# Patient Record
Sex: FEMALE | Race: WHITE | NOT HISPANIC OR LATINO | Employment: OTHER | URBAN - METROPOLITAN AREA
[De-identification: names, ages, dates, MRNs, and addresses within clinical notes are randomized per-mention and may not be internally consistent; named-entity substitution may affect disease eponyms.]

---

## 2018-03-12 ENCOUNTER — HOSPITAL ENCOUNTER (OUTPATIENT)
Dept: FAMILY MEDICINE CLINIC | Facility: CLINIC | Age: 33
Setting detail: SPECIMEN
Discharge: HOME | End: 2018-03-12
Attending: FAMILY MEDICINE | Admitting: FAMILY MEDICINE

## 2018-09-25 ENCOUNTER — HOSPITAL ENCOUNTER (OUTPATIENT)
Dept: FAMILY MEDICINE CLINIC | Facility: CLINIC | Age: 33
Setting detail: SPECIMEN
Discharge: HOME | End: 2018-09-25
Attending: FAMILY MEDICINE | Admitting: FAMILY MEDICINE

## 2018-09-25 LAB
CHOLEST SERPL-MCNC: 249 MG/DL
CHOLEST/HDLC SERPL: 6.6 {RATIO}
CONV LDL CHOLESTEROL DIRECT: 206 MG/DL (ref 0–100)
GLUCOSE SERPL-MCNC: 99 MG/DL (ref 65–99)
HDLC SERPL-MCNC: 38 MG/DL
LDLC/HDLC SERPL: 5.5 {RATIO}
LIPID INTERPRETATION: ABNORMAL
TRIGL SERPL-MCNC: 150 MG/DL
VLDLC SERPL CALC-MCNC: 5.8 MG/DL

## 2018-10-01 ENCOUNTER — HOSPITAL ENCOUNTER (OUTPATIENT)
Dept: ULTRASOUND IMAGING | Facility: HOSPITAL | Age: 33
Discharge: HOME | End: 2018-10-01
Attending: FAMILY MEDICINE | Admitting: FAMILY MEDICINE

## 2018-10-10 ENCOUNTER — HOSPITAL ENCOUNTER (OUTPATIENT)
Dept: OTHER | Facility: HOSPITAL | Age: 33
Discharge: HOME | End: 2018-10-10
Attending: SURGERY | Admitting: SURGERY

## 2018-10-10 LAB
ANION GAP SERPL CALC-SCNC: 10.7 MMOL/L (ref 10–20)
BASOPHILS # BLD AUTO: 0 10*3/UL (ref 0–0.2)
BASOPHILS NFR BLD AUTO: 1 % (ref 0–2)
BUN SERPL-MCNC: 7 MG/DL (ref 8–20)
BUN/CREAT SERPL: 11.7 (ref 5.4–26.2)
CALCIUM SERPL-MCNC: 9.2 MG/DL (ref 8.9–10.3)
CHLORIDE SERPL-SCNC: 104 MMOL/L (ref 101–111)
CONV CO2: 25 MMOL/L (ref 22–32)
CREAT UR-MCNC: 0.6 MG/DL (ref 0.4–1)
DIFFERENTIAL METHOD BLD: (no result)
EOSINOPHIL # BLD AUTO: 0.1 10*3/UL (ref 0–0.3)
EOSINOPHIL # BLD AUTO: 1 % (ref 0–3)
ERYTHROCYTE [DISTWIDTH] IN BLOOD BY AUTOMATED COUNT: 12.7 % (ref 11.5–14.5)
GLUCOSE SERPL-MCNC: 86 MG/DL (ref 65–99)
HCG INTACT+B SERPL-ACNC: <1 MIU/ML
HCT VFR BLD AUTO: 37.1 % (ref 35–49)
HGB BLD-MCNC: 12.7 G/DL (ref 12–15)
LYMPHOCYTES # BLD AUTO: 2.7 10*3/UL (ref 0.8–4.8)
LYMPHOCYTES NFR BLD AUTO: 28 % (ref 18–42)
MCH RBC QN AUTO: 29.9 PG (ref 26–32)
MCHC RBC AUTO-ENTMCNC: 34.3 G/DL (ref 32–36)
MCV RBC AUTO: 87.4 FL (ref 80–94)
MICRO REPORT STATUS: NORMAL
MONOCYTES # BLD AUTO: 0.6 10*3/UL (ref 0.1–1.3)
MONOCYTES NFR BLD AUTO: 7 % (ref 2–11)
NEUTROPHILS # BLD AUTO: 6.1 10*3/UL (ref 2.3–8.6)
NEUTROPHILS NFR BLD AUTO: 63 % (ref 50–75)
NRBC BLD AUTO-RTO: 0 /100{WBCS}
NRBC/RBC NFR BLD MANUAL: 0 10*3/UL
PLATELET # BLD AUTO: 256 10*3/UL (ref 150–450)
PMV BLD AUTO: 8.3 FL (ref 7.4–10.4)
POTASSIUM SERPL-SCNC: 3.7 MMOL/L (ref 3.6–5.1)
RBC # BLD AUTO: 4.25 10*6/UL (ref 4–5.4)
SODIUM SERPL-SCNC: 136 MMOL/L (ref 136–144)
WBC # BLD AUTO: 9.5 10*3/UL (ref 4.5–11.5)

## 2018-10-15 ENCOUNTER — HOSPITAL ENCOUNTER (OUTPATIENT)
Dept: PREOP | Facility: HOSPITAL | Age: 33
Setting detail: HOSPITAL OUTPATIENT SURGERY
Discharge: HOME | End: 2018-10-15
Attending: SURGERY | Admitting: SURGERY

## 2019-01-25 ENCOUNTER — HOSPITAL ENCOUNTER (OUTPATIENT)
Dept: FAMILY MEDICINE CLINIC | Facility: CLINIC | Age: 34
Setting detail: SPECIMEN
Discharge: HOME | End: 2019-01-25
Attending: FAMILY MEDICINE | Admitting: FAMILY MEDICINE

## 2019-01-25 LAB
ALBUMIN SERPL-MCNC: 3.8 G/DL (ref 3.5–4.8)
ALBUMIN/GLOB SERPL: 1.1 {RATIO} (ref 1–1.7)
ALP SERPL-CCNC: 77 IU/L (ref 32–91)
ALT SERPL-CCNC: 21 IU/L (ref 14–54)
ANION GAP SERPL CALC-SCNC: 13.2 MMOL/L (ref 10–20)
AST SERPL-CCNC: 23 IU/L (ref 15–41)
BILIRUB SERPL-MCNC: 0.5 MG/DL (ref 0.3–1.2)
BUN SERPL-MCNC: 8 MG/DL (ref 8–20)
BUN/CREAT SERPL: 13.3 (ref 5.4–26.2)
CALCIUM SERPL-MCNC: 9.4 MG/DL (ref 8.9–10.3)
CHLORIDE SERPL-SCNC: 101 MMOL/L (ref 101–111)
CHOLEST SERPL-MCNC: 182 MG/DL
CHOLEST/HDLC SERPL: 4.4 {RATIO}
CONV CO2: 25 MMOL/L (ref 22–32)
CONV LDL CHOLESTEROL DIRECT: 151 MG/DL (ref 0–100)
CONV TOTAL PROTEIN: 7.4 G/DL (ref 6.1–7.9)
CREAT UR-MCNC: 0.6 MG/DL (ref 0.4–1)
GLOBULIN UR ELPH-MCNC: 3.6 G/DL (ref 2.5–3.8)
GLUCOSE SERPL-MCNC: 114 MG/DL (ref 65–99)
HDLC SERPL-MCNC: 42 MG/DL
LDLC/HDLC SERPL: 3.6 {RATIO}
LIPID INTERPRETATION: ABNORMAL
POTASSIUM SERPL-SCNC: 4.2 MMOL/L (ref 3.6–5.1)
SODIUM SERPL-SCNC: 135 MMOL/L (ref 136–144)
TRIGL SERPL-MCNC: 67 MG/DL
VLDLC SERPL CALC-MCNC: 0 MG/DL

## 2019-03-11 ENCOUNTER — HOSPITAL ENCOUNTER (OUTPATIENT)
Dept: ORTHOPEDIC SURGERY | Facility: CLINIC | Age: 34
Discharge: HOME | End: 2019-03-11
Attending: PODIATRIST | Admitting: PODIATRIST

## 2019-03-12 ENCOUNTER — HOSPITAL ENCOUNTER (OUTPATIENT)
Dept: PREADMISSION TESTING | Facility: HOSPITAL | Age: 34
Discharge: HOME | End: 2019-03-12
Attending: PODIATRIST | Admitting: PODIATRIST

## 2019-03-12 LAB
ANION GAP SERPL CALC-SCNC: 12.9 MMOL/L (ref 10–20)
BACTERIA SPEC AEROBE CULT: NORMAL
BASOPHILS # BLD AUTO: 0.1 10*3/UL (ref 0–0.2)
BASOPHILS NFR BLD AUTO: 1 % (ref 0–2)
BUN SERPL-MCNC: 6 MG/DL (ref 8–20)
BUN/CREAT SERPL: 12 (ref 5.4–26.2)
CALCIUM SERPL-MCNC: 9.6 MG/DL (ref 8.9–10.3)
CHLORIDE SERPL-SCNC: 102 MMOL/L (ref 101–111)
CONV CO2: 25 MMOL/L (ref 22–32)
CREAT UR-MCNC: 0.5 MG/DL (ref 0.4–1)
DIFFERENTIAL METHOD BLD: (no result)
EOSINOPHIL # BLD AUTO: 0.1 10*3/UL (ref 0–0.3)
EOSINOPHIL # BLD AUTO: 1 % (ref 0–3)
ERYTHROCYTE [DISTWIDTH] IN BLOOD BY AUTOMATED COUNT: 12.9 % (ref 11.5–14.5)
GLUCOSE SERPL-MCNC: 101 MG/DL (ref 65–99)
HCT VFR BLD AUTO: 38.9 % (ref 35–49)
HGB BLD-MCNC: 13.2 G/DL (ref 12–15)
LYMPHOCYTES # BLD AUTO: 2.7 10*3/UL (ref 0.8–4.8)
LYMPHOCYTES NFR BLD AUTO: 31 % (ref 18–42)
Lab: NORMAL
MCH RBC QN AUTO: 30 PG (ref 26–32)
MCHC RBC AUTO-ENTMCNC: 34 G/DL (ref 32–36)
MCV RBC AUTO: 88.3 FL (ref 80–94)
MICRO REPORT STATUS: NORMAL
MONOCYTES # BLD AUTO: 0.7 10*3/UL (ref 0.1–1.3)
MONOCYTES NFR BLD AUTO: 7 % (ref 2–11)
NEUTROPHILS # BLD AUTO: 5.3 10*3/UL (ref 2.3–8.6)
NEUTROPHILS NFR BLD AUTO: 60 % (ref 50–75)
NRBC BLD AUTO-RTO: 0 /100{WBCS}
NRBC/RBC NFR BLD MANUAL: 0 10*3/UL
PLATELET # BLD AUTO: 285 10*3/UL (ref 150–450)
PMV BLD AUTO: 8.4 FL (ref 7.4–10.4)
POTASSIUM SERPL-SCNC: 3.9 MMOL/L (ref 3.6–5.1)
RBC # BLD AUTO: 4.41 10*6/UL (ref 4–5.4)
SODIUM SERPL-SCNC: 136 MMOL/L (ref 136–144)
SPECIMEN SOURCE: NORMAL
WBC # BLD AUTO: 8.8 10*3/UL (ref 4.5–11.5)

## 2019-03-15 ENCOUNTER — HOSPITAL ENCOUNTER (OUTPATIENT)
Dept: PREOP | Facility: HOSPITAL | Age: 34
Setting detail: HOSPITAL OUTPATIENT SURGERY
Discharge: HOME | End: 2019-03-15
Attending: PODIATRIST | Admitting: PODIATRIST

## 2019-04-24 ENCOUNTER — HOSPITAL ENCOUNTER (OUTPATIENT)
Dept: ORTHOPEDIC SURGERY | Facility: CLINIC | Age: 34
Discharge: HOME | End: 2019-04-24
Attending: PODIATRIST | Admitting: PODIATRIST

## 2019-05-22 ENCOUNTER — CONVERSION ENCOUNTER (OUTPATIENT)
Dept: ORTHOPEDIC SURGERY | Facility: CLINIC | Age: 34
End: 2019-05-22

## 2019-05-22 ENCOUNTER — HOSPITAL ENCOUNTER (OUTPATIENT)
Dept: ORTHOPEDIC SURGERY | Facility: CLINIC | Age: 34
Discharge: HOME | End: 2019-05-22
Attending: PODIATRIST | Admitting: PODIATRIST

## 2019-05-30 ENCOUNTER — CONVERSION ENCOUNTER (OUTPATIENT)
Dept: FAMILY MEDICINE CLINIC | Facility: CLINIC | Age: 34
End: 2019-05-30

## 2019-06-04 VITALS
HEART RATE: 64 BPM | WEIGHT: 222 LBS | SYSTOLIC BLOOD PRESSURE: 118 MMHG | DIASTOLIC BLOOD PRESSURE: 80 MMHG | BODY MASS INDEX: 38.11 KG/M2

## 2019-06-04 VITALS
DIASTOLIC BLOOD PRESSURE: 76 MMHG | BODY MASS INDEX: 37.9 KG/M2 | SYSTOLIC BLOOD PRESSURE: 109 MMHG | HEIGHT: 64 IN | HEART RATE: 78 BPM | WEIGHT: 222 LBS

## 2019-06-06 NOTE — PROGRESS NOTES
Vital Signs:    Patient Profile:    33 Years Old Female  Height:     64 inches  Weight:     222 pounds  BMI:        38.10     Temp:       98.1 degrees F oral  Pulse rate: 64 / minute  Pulse rhythm:   regular  BP Sittin / 80  (right arm)    Cuff size:  large      Problems: Active problems were reviewed with the patient during this visit.  Medications: Medications were reviewed with the patient during this visit.  Allergies: Allergies were reviewed with the patient during this visit.        Vitals Entered By: [facility]      Visit Type:  Follow-up Visit  Referring Provider:  Mark Hercules MD  Primary Provider:  Mark Hercules MD      History of Present Illness:  1) 32 y/o patient here for 4 month F/U of multiple medical problems.  2) patient is seeing Dr. Lucero about fractured left ankle. Put in ankle braces. Has been released by Dr. Lucero.  3) patient is not eating right.  4) patient c/o left hip pain, worse since fractured left ankle. Worse when lays on it. Does not hurt to walk.  5) patient with no depression. Mood is better. No SI.      Past Medical History:     Reviewed history from 2016 and no changes required:        Psychiatric Hx: depression,        Respiratory Hx: allergies        Genetic Background: autism        Endocrine Hx: obesity        HEENT Hx: hearing loss        Psychiatric Hx: anger issues        Musculoskeletal Hx: congenital hip dysplasia left        goiter        hyperglycemia        Chronic Right foot/ankle Deformity        Right Pupil Dilation        Migraine headaches        Social History:     Reviewed history from 2018 and no changes required:        Patient has never smoked.        Passive Smoke: N        Alcohol Use: N        Drug Use: N        HIV/High Risk: N        Regular Exercise: Y        Hx Domestic Abuse: N        [employer] Affecting Care: N                Risk Factors:     Smoked Tobacco Use:  Never smoker  Smokeless Tobacco Use:  Never  Passive smoke  exposure:  no  Drug use:  no  HIV high-risk behavior:  no  Caffeine use:  0 drinks per day  Alcohol use:  no  Exercise:  yes     Times per week:  minimal  Seatbelt use:  100 %  Sun Exposure:  rarely      Review of Systems          The patient complains of joint pain.  The patient denies fever, chills, diplopia, chest pain, palpitations, syncope, dyspnea on exertion, hemoptysis, abdominal pain, melena, hematochezia, jaundice, hematuria, depression and anxiety.        Physical Exam   Weight:  222  BP:  118/80 mm HG    Medication List:  LIPITOR 40 MG ORAL TABLET (ATORVASTATIN CALCIUM) 1 po q d  SERTRALINE  MG ORAL TABLET (SERTRALINE HCL) TAKE ONE TABLET BY MOUTH DAILY WITH FOOD  LIPITOR 20 MG ORAL TABLET (ATORVASTATIN CALCIUM) 1 po q d for chol  TIZANIDINE HCL 4MG TABLET (TIZANIDINE HCL) TAKE TWO TABLETS BY MOUTH EVERY NIGHT AT BEDTIME FOR SPASMS  TOPIRAMATE 100 MG TABLET (TOPIRAMATE) TAKE ONE TABLET BY MOUTH EVERY NIGHT AT BEDTIME FOR HEADACHE  BENZACLIN 1-5 % EXTERNAL GEL (CLINDAMYCIN PHOS-BENZOYL PEROX) apply bid  FREESTYLE LANCETS (LANCETS) use once daily if needed  FREESTYLE LITE TEST IN VITRO STRIP (GLUCOSE BLOOD) use as directed daily  ZOVIRAX 5 % EXTERNAL CREAM (ACYCLOVIR) apply every 3 hours for fever blisters  IBUPROFEN 800 MG ORAL TABLET (IBUPROFEN) TAKE ONE TABLET BY MOUTH THREE TIMES A DAY  LORAZEPAM 1 MG ORAL TABLET (LORAZEPAM) one tablet by mouth every 8 hours as needed  PANTOPRAZOLE SODIUM 40 MG TBEC (PANTOPRAZOLE SODIUM) TAKE ONE TABLET BY MOUTH DAILY  LAMICTAL 100 MG ORAL TABLET (LAMOTRIGINE) 1 po bid      Surgical History   Hysterectomy  right foot/ankle surgery  Right Ear Drum x2  Lap Cholecystectomy  10/15/18  Dr Can  Left ankle 3/15/19,    Risk Factors  Tobacco Use: Never smoker  Passive smoke exposure: no  Exercise: yes  Exercise: minimal times per week  Illicit Drug use: no      Orientation     Language   Alert 1  Total MMSE Score: 30    Physical Examination   General Appearance    Patient appears obese, pleasant  HEENT   PERRLA, EOMI, TM's normal.  Pharynx clear   Neck   +nodule left thyroid  Cardiovascular   Regular rate and rhythm  Lungs   Clear to auscultation  Abdomen   +obese, NT, ND  Musculoskeletal   +braces on both ankles  +tender left lateral hip, ROM OK        Impression & Recommendations:    Problem # 1:  HYPERGLYCEMIA (ICD-790.29) (LLD28-U18.9)  Assessment: Unchanged    Problem # 2:  HYPERLIPIDEMIA (ICD-272.4) (AUY31-D15.5)  Assessment: Improved    Her updated medication list for this problem includes:     Lipitor 40 Mg Oral Tablet (Atorvastatin calcium) ..... 1 po q d     Lipitor 20 Mg Oral Tablet (Atorvastatin calcium) ..... 1 po q d for chol      Problem # 3:  OBESITY (ICD-278.00) (URW75-M75.9)  Assessment: Deteriorated    Problem # 4:  MOOD DISORDER (ICD-296.90) (KRG62-W08)  Assessment: Improved    Problem # 5:  DEPRESSION (ICD-311) (LGQ72-U21.9)  Assessment: Improved    Her updated medication list for this problem includes:     Sertraline Hcl 100 Mg Oral Tablet (Sertraline hcl) ..... Take one tablet by mouth daily with food     Lorazepam 1 Mg Oral Tablet (Lorazepam) ..... One tablet by mouth every 8 hours as needed      Problem # 6:  Thyroid nodule,left (ICD-241.0) (WZT99-T77.1)  Assessment: New      Patient Instructions:  1)  Discussed importance of regular exercise and recommended starting or continuing a regular exercise program for good health.  2)  The patient was encouraged to lose weight for better health.  3)  Discussed the risk factors for developing diabetes including inactivity, obesity, elevated blood pressure, and elevated cholesterol. Encouraged health lifestyle habits and risk factor reduction.  4)  During this office visit we discussed the pertinent aspects of the visit and the treatment recommendations.  Patient was given the opportunity to ask questions and discuss other concerns.  5)  Schedule U/S thyroid (in Referral Office).  6)  F/U 4 months  fasting.    ...................................................................Erin Childress MA  May 31, 2019 10:27 AM                Medication Administration    Orders Added:  1)  Ofc Vst, Est Level III [32444]        Electronically signed by Mark Hercules MD on 06/03/2019 at 7:57 AM  ________________________________________________________________________       Disclaimer: Converted Note message may not contain all data elements that existed in the [employer] source system. Please see [email or web address] for the original note details.

## 2019-06-06 NOTE — PROGRESS NOTES
Visit Type:  Acute Visit  Referring Provider:  Mark Hercules MD  Primary Provider:  Mark Hercules MD    CC:   Left ankle ORIF 3/15/19 F/U.    History of Present Illness:  Patient returns for a 2 week follow-up after ankle ORIF.  She states that she is doing quite well.  She denies any significant pain and feels that the swelling has improved.  She has been ambulating in the Cam boot.   She continues to have instability and   weakness about the right ankle.      Past Medical History:     Reviewed history from 12/07/2016 and no changes required:        Psychiatric Hx: depression,        Respiratory Hx: allergies        Genetic Background: autism        Endocrine Hx: obesity        HEENT Hx: hearing loss        Psychiatric Hx: anger issues        Musculoskeletal Hx: congenital hip dysplasia left        goiter        hyperglycemia        Chronic Right foot/ankle Deformity        Right Pupil Dilation        Migraine headaches    Past Surgical History:     Reviewed history from 04/01/2019 and no changes required:        Hysterectomy        right foot/ankle surgery        Right Ear Drum x2        Lap Cholecystectomy  10/15/18  Dr Can        Left ankle 3/15/19    Active Medications (reviewed today):  LIPITOR 40 MG ORAL TABLET (ATORVASTATIN CALCIUM) 1 po q d  SERTRALINE  MG ORAL TABLET (SERTRALINE HCL) TAKE ONE TABLET BY MOUTH DAILY WITH FOOD  LIPITOR 20 MG ORAL TABLET (ATORVASTATIN CALCIUM) 1 po q d for chol  TIZANIDINE HCL 4MG TABLET (TIZANIDINE HCL) TAKE TWO TABLETS BY MOUTH EVERY NIGHT AT BEDTIME FOR SPASMS  TOPIRAMATE 100 MG TABLET (TOPIRAMATE) TAKE ONE TABLET BY MOUTH EVERY NIGHT AT BEDTIME FOR HEADACHE  BENZACLIN 1-5 % EXTERNAL GEL (CLINDAMYCIN PHOS-BENZOYL PEROX) apply bid  FREESTYLE LANCETS (LANCETS) use once daily if needed  FREESTYLE LITE TEST IN VITRO STRIP (GLUCOSE BLOOD) use as directed daily  ZOVIRAX 5 % EXTERNAL CREAM (ACYCLOVIR) apply every 3 hours for fever blisters  IBUPROFEN 800 MG ORAL  TABLET (IBUPROFEN) TAKE ONE TABLET BY MOUTH THREE TIMES A DAY  LORAZEPAM 1 MG ORAL TABLET (LORAZEPAM) one tablet by mouth every 8 hours as needed  PANTOPRAZOLE SODIUM 40 MG TBEC (PANTOPRAZOLE SODIUM) TAKE ONE TABLET BY MOUTH DAILY  LAMICTAL 100 MG ORAL TABLET (LAMOTRIGINE) 1 po bid    Current Allergies (reviewed today):  * ORANGE MOTRIN (Critical)  * ORANGE DYE IN MOTRIN (Critical)    Current Medications (including medications started today):   LIPITOR 40 MG ORAL TABLET (ATORVASTATIN CALCIUM) 1 po q d  SERTRALINE  MG ORAL TABLET (SERTRALINE HCL) TAKE ONE TABLET BY MOUTH DAILY WITH FOOD  LIPITOR 20 MG ORAL TABLET (ATORVASTATIN CALCIUM) 1 po q d for chol  TIZANIDINE HCL 4MG TABLET (TIZANIDINE HCL) TAKE TWO TABLETS BY MOUTH EVERY NIGHT AT BEDTIME FOR SPASMS  TOPIRAMATE 100 MG TABLET (TOPIRAMATE) TAKE ONE TABLET BY MOUTH EVERY NIGHT AT BEDTIME FOR HEADACHE  BENZACLIN 1-5 % EXTERNAL GEL (CLINDAMYCIN PHOS-BENZOYL PEROX) apply bid  FREESTYLE LANCETS (LANCETS) use once daily if needed  FREESTYLE LITE TEST IN VITRO STRIP (GLUCOSE BLOOD) use as directed daily  ZOVIRAX 5 % EXTERNAL CREAM (ACYCLOVIR) apply every 3 hours for fever blisters  IBUPROFEN 800 MG ORAL TABLET (IBUPROFEN) TAKE ONE TABLET BY MOUTH THREE TIMES A DAY  LORAZEPAM 1 MG ORAL TABLET (LORAZEPAM) one tablet by mouth every 8 hours as needed  PANTOPRAZOLE SODIUM 40 MG TBEC (PANTOPRAZOLE SODIUM) TAKE ONE TABLET BY MOUTH DAILY  LAMICTAL 100 MG ORAL TABLET (LAMOTRIGINE) 1 po bid    Post Operative History:   [facility]  Surgeon:        Nic  Surgery date:       03/15/2019  Procedure performed: Left ankle  Days post-op:       68          Vital Signs:    Patient Profile:    33 Years Old Female  Height:     64 inches  Weight:     222 pounds  BMI:        38.10     Pulse rate: 78 / minute  BP Sittin / 76  (left arm)    Cuff size:  large      Problems: Active problems were reviewed with the patient during this visit.  Medications: Medications were  reviewed with the patient during this visit.  Allergies: Allergies were reviewed with the patient during this visit.        Vitals Entered By: Dalia Jones CMA (May 22, 2019 12:42 PM)    Family History Summary:      Reviewed history Last on 2019 and no changes required:2019  First Degree Blood Relative - Has No Known Family History - None per pt Mother - Entered On: 2016    General Comments - FH:  Mother- alive -depression,anxiety  Father- - DM,CVA,heart disease    Social History:     Reviewed history from 2018 and no changes required:        Patient has never smoked.        Passive Smoke: N        Alcohol Use: N        Drug Use: N        HIV/High Risk: N        Regular Exercise: Y        Hx Domestic Abuse: N        Yarsani Affecting Care: N                Risk Factors:     Smoked Tobacco Use:  Never smoker  Smokeless Tobacco Use:  Never  Passive smoke exposure:  no  Drug use:  no  HIV high-risk behavior:  no  Caffeine use:  0 drinks per day  Alcohol use:  no  Exercise:  yes     Times per week:  minimal  Seatbelt use:  100 %  Sun Exposure:  rarely      Podiatry Exam       General Appearance:   obese; mild distress  Mental Status Exam        Judgement and Insight:  poor       Orientation:  Oriented to time, place, and person  Cardiovascular (Bilateral)       Dorsalis Pedis Pulse (BL):  2/4       Posterior Tibialis Pulse (BL):  2/4       Capillary Filling Time (BL):  1-3 Seconds       Edema (BL):  No Edema  Dermatological Exam       Temperature:  warm to warm       Skin Elasticity:  normal skin elasticity  Neurological Exam (Bilateral)       Paresthesia (Bl):  negative       Achilles DTRs (Bl):  symmetric       Tinel over Tarsal Tunnel (Bl):  negative  Additional Neurological Findings    Sensation and motor function are intact      MusculoSkeletal Exam (Bilateral)       Gait and Stance (Bl):   boot assisted       ROM (Bl):   range of motion is near full and nontender       Muscle  Strength (Bl):  symmetrical 5/5       Subluxed Digits (Bl):  no subluxation or laxity of joints       Dislocated Joints (Bl):  no dislocation of joints       Gastroc soleus equinus (Bl):  Inability to dorsiflex past neutral position both straight legged and bent knee       Post tibial tendon (Bl):  no soreness noted       Peroneal Tendon (Bl):  no soreness noted  Additional Musculoskelatal Findings   mild instability noted at the ankle region with anterior drawer testing, bilateral.  No gross deformity.  No significant rearfoot varus        Impression & Recommendations:    Problem # 1:  Other instability, right ankle (ICD-718.87) (NTX52-C52.371)   patient presents for two-month follow-up after a left ankle ORIF.  She does complain of continued discomfort and instability about the right ankle with weight-bearing activities.  Imaging was obtained today showing stable internal fixation and no signs of   fracture dislocation.  No gross degenerative changes are identified.  I explained that her symptoms are consistent with ankle instability.  We did discuss proper at home exercises.  I have also dispensed a lace-up ankle brace.  Greater than 15 minutes was   spent discussing the proper use and effects.  She is to monitor and follow up with me as needed.  Orders:  Orthotic mngmt upper, lower extr or trunk 15 minutes (72294)      The patient appears to be doing quite well.  Imaging was performed of the left ankle showing stable internal fixation.  Fracture lines are difficult to discern indicate healing.  I have asked that she transition out of the Cam boot to regular shoe.  She   may gradually increase activity as tolerated.  She is to call with any additional issues or concerns.    Other Orders:  Ankle, 3 views-TC only ([id number])  Ankle, 3 views-TC only ([id number])                  Medication Administration    Orders Added:  1)  Ankle, 3 views-TC only [[id number]]  2)  Ankle, 3 views-TC only [[id number]]  3)  [id number]  Vst-Est Level III [CPT-81265]  4)  Orthotic mngmt upper, lower extr or trunk 15 minutes [[id number]]    ]      Electronically signed by Karl Lucero on 05/23/2019 at 8:12 AM  ________________________________________________________________________       Disclaimer: Converted Note message may not contain all data elements that existed in the legacy source system. Please see [employer] for the original note details.

## 2019-06-14 ENCOUNTER — HOSPITAL ENCOUNTER (OUTPATIENT)
Dept: ULTRASOUND IMAGING | Facility: HOSPITAL | Age: 34
Discharge: HOME | End: 2019-06-14
Attending: FAMILY MEDICINE | Admitting: FAMILY MEDICINE

## 2019-06-19 DIAGNOSIS — E04.1 THYROID NODULE: Primary | ICD-10-CM

## 2019-06-20 ENCOUNTER — LAB (OUTPATIENT)
Dept: FAMILY MEDICINE CLINIC | Facility: CLINIC | Age: 34
End: 2019-06-20

## 2019-06-20 DIAGNOSIS — E04.1 THYROID NODULE: ICD-10-CM

## 2019-06-20 LAB
T3FREE SERPL-MCNC: 4.09 PG/ML (ref 2.39–6.79)
T4 SERPL-MCNC: 8.06 MCG/DL (ref 6.1–12.2)
TSH SERPL DL<=0.05 MIU/L-ACNC: 1.06 MIU/ML (ref 0.34–5.6)

## 2019-06-20 PROCEDURE — 84443 ASSAY THYROID STIM HORMONE: CPT | Performed by: FAMILY MEDICINE

## 2019-06-20 PROCEDURE — 84436 ASSAY OF TOTAL THYROXINE: CPT | Performed by: FAMILY MEDICINE

## 2019-06-20 PROCEDURE — 36415 COLL VENOUS BLD VENIPUNCTURE: CPT | Performed by: FAMILY MEDICINE

## 2019-06-20 PROCEDURE — 84481 FREE ASSAY (FT-3): CPT | Performed by: FAMILY MEDICINE

## 2019-07-26 ENCOUNTER — APPOINTMENT (OUTPATIENT)
Dept: GENERAL RADIOLOGY | Facility: HOSPITAL | Age: 34
End: 2019-07-26

## 2019-07-26 ENCOUNTER — TELEPHONE (OUTPATIENT)
Dept: CARDIOLOGY | Facility: CLINIC | Age: 34
End: 2019-07-26

## 2019-07-26 ENCOUNTER — HOSPITAL ENCOUNTER (EMERGENCY)
Facility: HOSPITAL | Age: 34
Discharge: HOME | End: 2019-07-26
Attending: EMERGENCY MEDICINE | Admitting: EMERGENCY MEDICINE

## 2019-07-26 VITALS
WEIGHT: 218.48 LBS | HEIGHT: 65 IN | RESPIRATION RATE: 17 BRPM | HEART RATE: 84 BPM | TEMPERATURE: 98.4 F | OXYGEN SATURATION: 100 % | BODY MASS INDEX: 36.4 KG/M2 | SYSTOLIC BLOOD PRESSURE: 103 MMHG | DIASTOLIC BLOOD PRESSURE: 63 MMHG

## 2019-07-26 DIAGNOSIS — R07.9 CHEST PAIN, UNSPECIFIED TYPE: Primary | ICD-10-CM

## 2019-07-26 LAB
ALBUMIN SERPL-MCNC: 3.7 G/DL (ref 3.5–4.8)
ALBUMIN/GLOB SERPL: 0.9 G/DL (ref 1–1.7)
ALP SERPL-CCNC: 87 U/L (ref 32–91)
ALT SERPL W P-5'-P-CCNC: 23 U/L (ref 14–54)
ANION GAP SERPL CALCULATED.3IONS-SCNC: 13.6 MMOL/L (ref 5–15)
AST SERPL-CCNC: 20 U/L (ref 15–41)
BASOPHILS # BLD AUTO: 0.2 10*3/MM3 (ref 0–0.2)
BASOPHILS NFR BLD AUTO: 1.8 % (ref 0–1.5)
BILIRUB SERPL-MCNC: 0.8 MG/DL (ref 0.3–1.2)
BUN BLD-MCNC: 11 MG/DL (ref 8–20)
BUN/CREAT SERPL: 18.3 (ref 5.4–26.2)
CALCIUM SPEC-SCNC: 9.3 MG/DL (ref 8.9–10.3)
CHLORIDE SERPL-SCNC: 103 MMOL/L (ref 101–111)
CO2 SERPL-SCNC: 24 MMOL/L (ref 22–32)
CREAT BLD-MCNC: 0.6 MG/DL (ref 0.4–1)
DEPRECATED RDW RBC AUTO: 39.8 FL (ref 37–54)
EOSINOPHIL # BLD AUTO: 0.1 10*3/MM3 (ref 0–0.4)
EOSINOPHIL NFR BLD AUTO: 0.7 % (ref 0.3–6.2)
ERYTHROCYTE [DISTWIDTH] IN BLOOD BY AUTOMATED COUNT: 13.1 % (ref 12.3–15.4)
GFR SERPL CREATININE-BSD FRML MDRD: 117 ML/MIN/1.73
GLOBULIN UR ELPH-MCNC: 4.1 GM/DL (ref 2.5–3.8)
GLUCOSE BLD-MCNC: 102 MG/DL (ref 65–99)
HCT VFR BLD AUTO: 41 % (ref 34–46.6)
HGB BLD-MCNC: 13.9 G/DL (ref 12–15.9)
HOLD SPECIMEN: NORMAL
LYMPHOCYTES # BLD AUTO: 2.8 10*3/MM3 (ref 0.7–3.1)
LYMPHOCYTES NFR BLD AUTO: 27 % (ref 19.6–45.3)
MCH RBC QN AUTO: 29.2 PG (ref 26.6–33)
MCHC RBC AUTO-ENTMCNC: 34 G/DL (ref 31.5–35.7)
MCV RBC AUTO: 85.7 FL (ref 79–97)
MONOCYTES # BLD AUTO: 0.5 10*3/MM3 (ref 0.1–0.9)
MONOCYTES NFR BLD AUTO: 4.8 % (ref 5–12)
NEUTROPHILS # BLD AUTO: 6.7 10*3/MM3 (ref 1.7–7)
NEUTROPHILS NFR BLD AUTO: 65.7 % (ref 42.7–76)
NRBC BLD AUTO-RTO: 0.1 /100 WBC (ref 0–0.2)
PLATELET # BLD AUTO: 291 10*3/MM3 (ref 140–450)
PMV BLD AUTO: 8.3 FL (ref 6–12)
POTASSIUM BLD-SCNC: 3.6 MMOL/L (ref 3.6–5.1)
PROT SERPL-MCNC: 7.8 G/DL (ref 6.1–7.9)
RBC # BLD AUTO: 4.78 10*6/MM3 (ref 3.77–5.28)
SODIUM BLD-SCNC: 137 MMOL/L (ref 136–144)
TROPONIN I SERPL-MCNC: <0.03 NG/ML (ref 0–0.03)
WBC NRBC COR # BLD: 10.3 10*3/MM3 (ref 3.4–10.8)
WHOLE BLOOD HOLD SPECIMEN: NORMAL

## 2019-07-26 PROCEDURE — 93005 ELECTROCARDIOGRAM TRACING: CPT | Performed by: EMERGENCY MEDICINE

## 2019-07-26 PROCEDURE — 99284 EMERGENCY DEPT VISIT MOD MDM: CPT

## 2019-07-26 PROCEDURE — 85025 COMPLETE CBC W/AUTO DIFF WBC: CPT | Performed by: EMERGENCY MEDICINE

## 2019-07-26 PROCEDURE — 71045 X-RAY EXAM CHEST 1 VIEW: CPT

## 2019-07-26 PROCEDURE — 84484 ASSAY OF TROPONIN QUANT: CPT | Performed by: EMERGENCY MEDICINE

## 2019-07-26 PROCEDURE — 80053 COMPREHEN METABOLIC PANEL: CPT | Performed by: EMERGENCY MEDICINE

## 2019-07-26 RX ORDER — IBUPROFEN 800 MG/1
TABLET ORAL EVERY 8 HOURS
COMMUNITY
Start: 2017-08-09 | End: 2020-03-02

## 2019-07-26 RX ORDER — CLINDAMYCIN AND BENZOYL PEROXIDE 10; 50 MG/G; MG/G
GEL TOPICAL
COMMUNITY
Start: 2015-02-05 | End: 2023-01-05 | Stop reason: SDUPTHER

## 2019-07-26 RX ORDER — ASPIRIN 81 MG/1
324 TABLET, CHEWABLE ORAL ONCE
Status: COMPLETED | OUTPATIENT
Start: 2019-07-26 | End: 2019-07-26

## 2019-07-26 RX ORDER — TOPIRAMATE 100 MG/1
TABLET, FILM COATED ORAL
COMMUNITY
Start: 2018-04-03 | End: 2019-09-26

## 2019-07-26 RX ORDER — ASPIRIN 325 MG
325 TABLET ORAL ONCE
Status: DISCONTINUED | OUTPATIENT
Start: 2019-07-26 | End: 2019-07-26 | Stop reason: HOSPADM

## 2019-07-26 RX ORDER — LORAZEPAM 1 MG/1
TABLET ORAL
COMMUNITY
Start: 2011-11-01 | End: 2019-12-27

## 2019-07-26 RX ORDER — LAMOTRIGINE 100 MG/1
TABLET ORAL EVERY 12 HOURS
COMMUNITY
Start: 2017-10-04 | End: 2020-02-10

## 2019-07-26 RX ORDER — PANTOPRAZOLE SODIUM 40 MG/1
TABLET, DELAYED RELEASE ORAL EVERY 24 HOURS
COMMUNITY
Start: 2017-09-06 | End: 2019-09-13 | Stop reason: SDUPTHER

## 2019-07-26 RX ORDER — SODIUM CHLORIDE 0.9 % (FLUSH) 0.9 %
10 SYRINGE (ML) INJECTION AS NEEDED
Status: DISCONTINUED | OUTPATIENT
Start: 2019-07-26 | End: 2019-07-26 | Stop reason: HOSPADM

## 2019-07-26 RX ORDER — ATORVASTATIN CALCIUM 40 MG/1
TABLET, FILM COATED ORAL EVERY 24 HOURS
COMMUNITY
Start: 2019-01-25 | End: 2019-09-27

## 2019-07-26 RX ORDER — SERTRALINE HYDROCHLORIDE 100 MG/1
TABLET, FILM COATED ORAL EVERY 24 HOURS
COMMUNITY
Start: 2018-12-31 | End: 2019-09-13 | Stop reason: SDUPTHER

## 2019-07-26 RX ADMIN — ASPIRIN 81 MG 324 MG: 81 TABLET ORAL at 15:37

## 2019-07-26 NOTE — TELEPHONE ENCOUNTER
PATIENT'S FATHER CALLED (KRISTEL MUELLER) AND STATED THAT HE WANTED TO MAKE AN APT W/ DR. CABRERA FOR HIS DAUGHTER TODAY. SHE IS NOT A CURRENT PATIENT, BUT HE IS. I TOLD HIM WE WOULD NOT BE ABLE TO SEE HER TODAY BUT I WOULD ASK IF DR CABRERA WOULD WANT HER TO COME IN SOON. PATIENT HAD EPISODE OF CHEST DISCOMFORT AND TIGHTNESS LAST NIGHT. HAS HISTORY OF HIGH CHOLESTEROL AND HEART DISEASE ON BOTH SIDES OF THE FAMILY. SEES DR. DONAHUE FOR PCP. PATIENT IS AUTISTIC PER FATHER. OK TO SCHEDULE APT? PLEASE ADVISE. #[phone]

## 2019-07-26 NOTE — TELEPHONE ENCOUNTER
CALLED AND SPOKE W/ FATHER. THEY TOOK PATIENT TO ER BECAUSE SHE WAS COMPLAINING OF CHEST PAIN AGAIN. THEY ARE GOING TO CALL BACK ONCE SHE IS DISCHARGED TO LET US KNOW IF THEY WANT TO SCHEDULE.

## 2019-07-30 ENCOUNTER — PATIENT OUTREACH (OUTPATIENT)
Dept: CASE MANAGEMENT | Facility: OTHER | Age: 34
End: 2019-07-30

## 2019-07-30 NOTE — OUTREACH NOTE
Patient Outreach Note    Pt seen at [facility] ED on 7/26/19 for chest pain. Called pt, spoke with pts mother Marita. Marita states pt has still been having some chest pain off and on. No reports of shortness of breath or other symptoms. States pt is taking Ibuprofen 800 mg daily. Marita confirmed pt has appointment with cardiologist on 8/1/19 for follow up. Explained role of care advisor. No other needs identified, Marita declines future outreach at this time    Pierce Charlton RN    7/30/2019, 3:33 PM

## 2019-08-01 ENCOUNTER — OFFICE VISIT (OUTPATIENT)
Dept: CARDIOLOGY | Facility: CLINIC | Age: 34
End: 2019-08-01

## 2019-08-01 VITALS
WEIGHT: 219 LBS | DIASTOLIC BLOOD PRESSURE: 74 MMHG | OXYGEN SATURATION: 98 % | HEIGHT: 65 IN | SYSTOLIC BLOOD PRESSURE: 104 MMHG | HEART RATE: 84 BPM | BODY MASS INDEX: 36.49 KG/M2

## 2019-08-01 DIAGNOSIS — E78.5 DYSLIPIDEMIA: ICD-10-CM

## 2019-08-01 DIAGNOSIS — Z82.49 FAMILY HISTORY OF HEART DISEASE: ICD-10-CM

## 2019-08-01 DIAGNOSIS — R07.2 PRECORDIAL PAIN: Primary | ICD-10-CM

## 2019-08-01 PROCEDURE — 99204 OFFICE O/P NEW MOD 45 MIN: CPT | Performed by: INTERNAL MEDICINE

## 2019-08-01 RX ORDER — ASPIRIN 81 MG/1
81 TABLET, CHEWABLE ORAL DAILY
COMMUNITY

## 2019-08-01 NOTE — PROGRESS NOTES
Cardiology Consult Note    Patient Identification:  Name: Elyssa Olivarez  Age: 33 y.o.  Sex: female  :  1985  MRN: [id number]             Requesting Physician:  Dr. Hercules    Reason for Consultation / Chief Complaint: Chest pain    History of Present Illness:      This is a 33-year-old with PMH of    Autism,  Hyperlipidemia  Goiter, migraine headaches  VISHNU, cholecystectomy  History of bilateral ankle surgery  Positive family history of premature CAD in father  Non-smoker  Allergy/intolerance to orange flavored ibuprofen    Here for evaluation of chest pain.  Patient went to the emergency room with chest pain.  Family has a difficult time discerning patient's chest pain was having pain last year and had cholecystectomy in October and her pain did not improve and continued to have substernal, midsternal, sharp chest pain which is intermittent and has been worse in the recent past therefore went into the emergency room and work-up revealed normal CMP except for glucose of 102, normal CBC, normal troponin, normal chest x-ray.  Is here for follow-up continues to have intermittent sharp substernal chest pain has been noticing dyspnea on exertion and shortness of breath sometimes.    Assessment:      Recurrent prolonged chest pain of unclear etiology  Hyperlipidemia  Positive family history of premature CAD    Recommendations / Plan:        We will check stress test, patient cannot walk due to ankle surgeries and ankle replacement, will do a Lexiscan Cardiolite risk benefits alternatives explained.  We will follow-up and consider further evaluation and treatment      Past Medical History:  Past Medical History:   Diagnosis Date   • Autism disorder    • Hyperlipidemia    • Partial hearing loss      Past Surgical History:  Past Surgical History:   Procedure Laterality Date   • ANKLE SURGERY Bilateral    • GALLBLADDER SURGERY     • HYSTERECTOMY     • TYMPANOPLASTY        Allergies:  Allergies   Allergen  Reactions   • [place] Concentrate [Flavoring Agent] Hives     [place] ibuprofen     Home Meds:  Current Meds:     Current Outpatient Medications:   •  aspirin 81 MG chewable tablet, Chew 81 mg Daily., Disp: , Rfl:   •  atorvastatin (LIPITOR) 40 MG tablet, Daily., Disp: , Rfl:   •  clindamycin-benzoyl peroxide (BENZACLIN) 1-5 % gel, BENZACLIN 1-5 % GEL, Disp: , Rfl:   •  ibuprofen (ADVIL,MOTRIN) 800 MG tablet, Every 8 (Eight) Hours., Disp: , Rfl:   •  lamoTRIgine (LAMICTAL) 100 MG tablet, Every 12 (Twelve) Hours., Disp: , Rfl:   •  LORazepam (ATIVAN) 1 MG tablet, LORAZEPAM 1 MG TABS, Disp: , Rfl:   •  pantoprazole (PROTONIX) 40 MG EC tablet, Daily., Disp: , Rfl:   •  sertraline (ZOLOFT) 100 MG tablet, Daily., Disp: , Rfl:   •  topiramate (TOPAMAX) 100 MG tablet, TOPAMAX 100 MG TABS, Disp: , Rfl:   Social History:   Social History     Tobacco Use   • Smoking status: Never Smoker   • Smokeless tobacco: Never Used   Substance Use Topics   • Alcohol use: No     Frequency: Never      Family History:  Family History   Problem Relation Age of Onset   • Congenital heart disease Mother    • Atrial fibrillation Mother    • Heart disease Father    • Heart disease Maternal Uncle    • Heart disease Maternal Grandmother    • Heart disease Paternal Grandmother         Review of Systems    Constitutional: No weakness,fatigue, fever, rigors, chills   Eyes: No vision changes, eye pain   ENT/oropharynx: No difficulty swallowing, sore throat, epistaxis, changes in hearing   Cardiovascular: c/o chest pain, chest tightness, no palpitations, paroxysmal nocturnal dyspnea, orthopnea, diaphoresis, dizziness / syncopal episode   Respiratory: No shortness of breath, dyspnea on exertion, cough, wheezing hemoptysis   Gastrointestinal: No abdominal pain, nausea, vomiting, diarrhea, bloody stools   Genitourinary: No hematuria, dysuria   Neurological: No headache, tremors, numbness,  one-sided weakness    Musculoskeletal: No cramps, myalgias,  joint  "pain, joint swelling   Integument: No rash, edema           Constitutional:  Heart Rate:  [84] 84  BP: (104)/(74) 104/74    Physical Exam   /74 (BP Location: Left arm, Patient Position: Sitting, Cuff Size: Large Adult)   Pulse 84   Ht 165.1 cm (65\")   Wt 99.3 kg (219 lb)   SpO2 98%   BMI 36.44 kg/m²   General:  Appears in no acute distress  Eyes: Conjunctivae is clear and sclera is anicteric  HEENT:  No JVD. Thyroid not visibly enlarged. No mucosal pallor or cyanosis  Respiratory: Respirations regular and unlabored at rest.  Bilateral breath sounds with good air entry in all fields. No crackles, rubs or wheezes auscultated  Cardiovascular: S1,S2, Regular rate and rhythm. No murmur, rub or gallop auscultated. No carotid bruits. DP/PT pulses    . No pretibial pitting edema  Gastrointestinal: Abdomen soft, flat, non tender.  No hepatosplenomegaly. No ascites  Musculoskeletal:  No abnormal movements  Extremities: No digital clubbing or cyanosis  Skin: Color pink. Skin warm and dry to touch. No rashes  No xanthoma  Neuro: Alert and awake, no lateralizing deficits appreciated              Cardiographics  ECG: EKG tracing was  personally reviewed by me  No orders to display   EKG from recent ER visit on 7/26/2019 reviewed by me shows sinus rhythm at the rate of 83 bpm with incomplete right bundle branch block      Imaging  Chest X-ray  From recent ER visit is negative for acute infiltrates or effusion.    Lab Review: I have reviewed the labs  Results from last 7 days   Lab Units 07/26/19  1534   TROPONIN I ng/mL <0.030         Results from last 7 days   Lab Units 07/26/19  1534   SODIUM mmol/L 137   POTASSIUM mmol/L 3.6   BUN mg/dL 11   CREATININE mg/dL 0.60   CALCIUM mg/dL 9.3     @LABRCNTIPbnp@  Results from last 7 days   Lab Units 07/26/19  1534   WBC 10*3/mm3 10.30   HEMOGLOBIN g/dL 13.9   HEMATOCRIT % 41.0   PLATELETS 10*3/mm3 291                 Arden Gould MD  8/1/2019, 4:10 PM      EMR " Dragon/Transcription:   Dictated utilizing Dragon dictation

## 2019-08-09 ENCOUNTER — OFFICE VISIT (OUTPATIENT)
Dept: CARDIOLOGY | Facility: CLINIC | Age: 34
End: 2019-08-09

## 2019-08-09 ENCOUNTER — HOSPITAL ENCOUNTER (OUTPATIENT)
Dept: CARDIOLOGY | Facility: HOSPITAL | Age: 34
Discharge: HOME | End: 2019-08-09

## 2019-08-09 VITALS
WEIGHT: 221 LBS | DIASTOLIC BLOOD PRESSURE: 75 MMHG | BODY MASS INDEX: 36.82 KG/M2 | HEART RATE: 100 BPM | SYSTOLIC BLOOD PRESSURE: 106 MMHG | HEIGHT: 65 IN | OXYGEN SATURATION: 98 %

## 2019-08-09 DIAGNOSIS — Z82.49 FAMILY HISTORY OF PREMATURE CAD: ICD-10-CM

## 2019-08-09 DIAGNOSIS — R07.89 OTHER CHEST PAIN: Primary | ICD-10-CM

## 2019-08-09 DIAGNOSIS — E78.5 DYSLIPIDEMIA: ICD-10-CM

## 2019-08-09 DIAGNOSIS — Z82.49 FAMILY HISTORY OF HEART DISEASE: ICD-10-CM

## 2019-08-09 DIAGNOSIS — R07.2 PRECORDIAL PAIN: ICD-10-CM

## 2019-08-09 PROCEDURE — 0 TECHNETIUM SESTAMIBI: Performed by: INTERNAL MEDICINE

## 2019-08-09 PROCEDURE — 99212 OFFICE O/P EST SF 10 MIN: CPT | Performed by: INTERNAL MEDICINE

## 2019-08-09 PROCEDURE — 93017 CV STRESS TEST TRACING ONLY: CPT

## 2019-08-09 PROCEDURE — 78452 HT MUSCLE IMAGE SPECT MULT: CPT

## 2019-08-09 PROCEDURE — A9500 TC99M SESTAMIBI: HCPCS | Performed by: INTERNAL MEDICINE

## 2019-08-09 PROCEDURE — 93016 CV STRESS TEST SUPVJ ONLY: CPT | Performed by: INTERNAL MEDICINE

## 2019-08-09 PROCEDURE — 25010000002 REGADENOSON 0.4 MG/5ML SOLUTION: Performed by: INTERNAL MEDICINE

## 2019-08-09 RX ADMIN — TECHNETIUM TC 99M SESTAMIBI 1 DOSE: 1 INJECTION INTRAVENOUS at 09:45

## 2019-08-09 RX ADMIN — TECHNETIUM TC 99M SESTAMIBI 1 DOSE: 1 INJECTION INTRAVENOUS at 11:30

## 2019-08-09 RX ADMIN — REGADENOSON 0.4 MG: 0.08 INJECTION, SOLUTION INTRAVENOUS at 11:30

## 2019-08-09 NOTE — PROGRESS NOTES
Subjective:     Encounter Date:08/09/2019      Patient ID: Elyssa Olivarez is a 33 y.o. female.    Chief Complaint: Stress test followup  History of Present Illness see below      Past Medical History:  Past Medical History:   Diagnosis Date   • Autism disorder    • Hyperlipidemia    • Partial hearing loss      Past Surgical History:  Past Surgical History:   Procedure Laterality Date   • ANKLE SURGERY Bilateral    • GALLBLADDER SURGERY     • HYSTERECTOMY     • TYMPANOPLASTY        Allergies:  Allergies   Allergen Reactions   • Orange Concentrate [Flavoring Agent] Hives     Parke ibuprofen     Home Meds:  Current Meds:     Current Outpatient Medications:   •  aspirin 81 MG chewable tablet, Chew 81 mg Daily., Disp: , Rfl:   •  atorvastatin (LIPITOR) 40 MG tablet, Daily., Disp: , Rfl:   •  clindamycin-benzoyl peroxide (BENZACLIN) 1-5 % gel, BENZACLIN 1-5 % GEL, Disp: , Rfl:   •  ibuprofen (ADVIL,MOTRIN) 800 MG tablet, Every 8 (Eight) Hours., Disp: , Rfl:   •  lamoTRIgine (LAMICTAL) 100 MG tablet, Every 12 (Twelve) Hours., Disp: , Rfl:   •  LORazepam (ATIVAN) 1 MG tablet, LORAZEPAM 1 MG TABS, Disp: , Rfl:   •  pantoprazole (PROTONIX) 40 MG EC tablet, Daily., Disp: , Rfl:   •  sertraline (ZOLOFT) 100 MG tablet, Daily., Disp: , Rfl:   •  topiramate (TOPAMAX) 100 MG tablet, TOPAMAX 100 MG TABS, Disp: , Rfl:   Social History:   Social History     Tobacco Use   • Smoking status: Never Smoker   • Smokeless tobacco: Never Used   Substance Use Topics   • Alcohol use: No     Frequency: Never      Family History:  Family History   Problem Relation Age of Onset   • Congenital heart disease Mother    • Atrial fibrillation Mother    • Heart disease Father    • Heart disease Maternal Uncle    • Heart disease Maternal Grandmother    • Heart disease Paternal Grandmother         The following portions of the patient's history were reviewed and updated as appropriate: allergies, current medications, past family history, past  "medical history, past social history, past surgical history and problem list.    Review of Systems   Cardiovascular: Positive for chest pain and leg swelling. Negative for palpitations.   Respiratory: Negative for shortness of breath.    Neurological: Negative for dizziness, light-headedness and numbness.       Procedures       Objective:     Physical Exam   /75 (BP Location: Left arm, Patient Position: Sitting, Cuff Size: Large Adult)   Pulse 100   Ht 165.1 cm (65\")   Wt 100 kg (221 lb)   SpO2 98%   BMI 36.78 kg/m²   General:  Appears in no acute distress  Eyes: Sclera is anicteric,  conjunctiva is clear   HEENT:  No JVD. Thyroid not visibly enlarged. No mucosal pallor or cyanosis  Respiratory: Respirations regular and unlabored at rest.  Bilaterally good breath sounds, with good air entry in all fields. No crackles, rubs or wheezes auscultated  Cardiovascular: S1,S2 Regular rate and rhythm. No murmur, rub or gallop auscultated. No carotid bruits. DP/PT pulses    . No pretibial pitting edema  Gastrointestinal: Abdomen soft, flat, non tender. Bowel sounds present. No hepatosplenomegaly. No ascites  Musculoskeletal:  No abnormal movements  Extremities: No digital clubbing or cyanosis  Skin: Color pink. Skin warm and dry to touch. No rashes  No xanthoma  Neuro: Alert and awake, no lateralizing deficits appreciated    Lab Review:       Assessment:         No diagnosis found.    Reason for Consultation / Chief Complaint: Chest pain    History of Present Illness:      This is a 33-year-old with PMH of    Autism,  Hyperlipidemia  Goiter, migraine headaches  VISHNU, cholecystectomy  History of bilateral ankle surgery  Positive family history of premature CAD in father  Non-smoker  Allergy/intolerance to orange flavored ibuprofen    Here for stress test follow-up.  Patient went to the emergency room with chest pain.  Family has a difficult time discerning patient's chest pain was having pain last year and had " cholecystectomy in October and her pain did not improve and continued to have substernal, midsternal, sharp chest pain which is intermittent and has been worse in the recent past therefore went into the emergency room and work-up revealed normal CMP except for glucose of 102, normal CBC, normal troponin, normal chest x-ray.  Is here for follow-up continues to have intermittent sharp substernal chest pain has been noticing dyspnea on exertion and shortness of breath sometimes.  Patient is a difficult historian due to autism.  Patient's arterial blood pressure is 106/75 oxygen saturation of 98% on room    Assessment:      Recurrent prolonged chest pain of unclear etiology  Hyperlipidemia  Positive family history of premature CAD    Recommendations / Plan:        Patient underwent Lexiscan Cardiolite which is negative for ischemia.  Reviewed results with patient's family and patient. risk benefits alternatives explained.  Patient's chest pain appears noncardiac advised her to call me back if her pain is worse       Plan:

## 2019-08-15 LAB
BH CV STRESS COMMENTS STAGE 1: NORMAL
BH CV STRESS DOSE REGADENOSON STAGE 1: 0.4
BH CV STRESS DURATION MIN STAGE 1: 0
BH CV STRESS DURATION SEC STAGE 1: 10
BH CV STRESS PROTOCOL 1: NORMAL
BH CV STRESS RECOVERY BP: NORMAL MMHG
BH CV STRESS RECOVERY HR: 134 BPM
BH CV STRESS STAGE 1: 1
MAXIMAL PREDICTED HEART RATE: 189 BPM
STRESS BASELINE BP: NORMAL MMHG
STRESS BASELINE HR: 72 BPM
STRESS TARGET HR: 161 BPM

## 2019-08-15 PROCEDURE — 93018 CV STRESS TEST I&R ONLY: CPT | Performed by: INTERNAL MEDICINE

## 2019-08-15 PROCEDURE — 78452 HT MUSCLE IMAGE SPECT MULT: CPT | Performed by: INTERNAL MEDICINE

## 2019-09-13 RX ORDER — PANTOPRAZOLE SODIUM 40 MG/1
TABLET, DELAYED RELEASE ORAL
Qty: 30 TABLET | Refills: 4 | Status: SHIPPED | OUTPATIENT
Start: 2019-09-13 | End: 2020-02-10

## 2019-09-13 RX ORDER — SERTRALINE HYDROCHLORIDE 100 MG/1
TABLET, FILM COATED ORAL
Qty: 90 TABLET | Refills: 0 | Status: SHIPPED | OUTPATIENT
Start: 2019-09-13 | End: 2019-09-26

## 2019-09-26 ENCOUNTER — OFFICE VISIT (OUTPATIENT)
Dept: FAMILY MEDICINE CLINIC | Facility: CLINIC | Age: 34
End: 2019-09-26

## 2019-09-26 VITALS
RESPIRATION RATE: 12 BRPM | HEART RATE: 91 BPM | DIASTOLIC BLOOD PRESSURE: 83 MMHG | BODY MASS INDEX: 36.94 KG/M2 | SYSTOLIC BLOOD PRESSURE: 120 MMHG | WEIGHT: 222 LBS

## 2019-09-26 DIAGNOSIS — E78.2 MIXED HYPERLIPIDEMIA: Primary | ICD-10-CM

## 2019-09-26 DIAGNOSIS — F39 MOOD DISORDER (HCC): ICD-10-CM

## 2019-09-26 DIAGNOSIS — F84.0 AUTISM: ICD-10-CM

## 2019-09-26 DIAGNOSIS — J30.9 ALLERGIC RHINITIS, UNSPECIFIED SEASONALITY, UNSPECIFIED TRIGGER: ICD-10-CM

## 2019-09-26 DIAGNOSIS — E66.09 CLASS 1 OBESITY DUE TO EXCESS CALORIES WITH SERIOUS COMORBIDITY AND BODY MASS INDEX (BMI) OF 30.0 TO 30.9 IN ADULT: ICD-10-CM

## 2019-09-26 PROBLEM — F41.9 ANXIETY DISORDER: Status: ACTIVE | Noted: 2019-09-26

## 2019-09-26 PROBLEM — H57.059: Status: ACTIVE | Noted: 2017-01-06

## 2019-09-26 PROBLEM — S82.842A DISPLACED BIMALLEOLAR FRACTURE OF LEFT LOWER LEG, INITIAL ENCOUNTER FOR CLOSED FRACTURE: Status: ACTIVE | Noted: 2019-03-11

## 2019-09-26 PROBLEM — K80.20 CHOLELITHIASIS: Status: ACTIVE | Noted: 2018-10-10

## 2019-09-26 PROBLEM — L70.9 ACNE: Status: ACTIVE | Noted: 2019-01-25

## 2019-09-26 PROBLEM — K21.9 GASTROESOPHAGEAL REFLUX DISEASE: Status: ACTIVE | Noted: 2019-09-26

## 2019-09-26 PROBLEM — F32.A DEPRESSION: Status: ACTIVE | Noted: 2019-09-26

## 2019-09-26 LAB
ARTICHOKE IGE QN: 143 MG/DL (ref 0–100)
CHOLEST SERPL-MCNC: 181 MG/DL
HDLC SERPL QL: 4.76
HDLC SERPL-MCNC: 38 MG/DL
LDLC/HDLC SERPL: 3.09 {RATIO}
TRIGL SERPL-MCNC: 128 MG/DL
VLDLC SERPL-MCNC: 25.6 MG/DL

## 2019-09-26 PROCEDURE — 99213 OFFICE O/P EST LOW 20 MIN: CPT | Performed by: FAMILY MEDICINE

## 2019-09-26 PROCEDURE — 80061 LIPID PANEL: CPT | Performed by: FAMILY MEDICINE

## 2019-09-26 RX ORDER — TOPIRAMATE 100 MG/1
100 TABLET, FILM COATED ORAL NIGHTLY
Qty: 30 TABLET | Refills: 11 | COMMUNITY
Start: 2019-09-26 | End: 2020-04-09

## 2019-09-26 RX ORDER — TIZANIDINE 4 MG/1
TABLET ORAL
COMMUNITY
Start: 2018-06-07 | End: 2020-04-09

## 2019-09-26 RX ORDER — SERTRALINE HYDROCHLORIDE 100 MG/1
100 TABLET, FILM COATED ORAL DAILY
Qty: 90 TABLET | Refills: 0 | COMMUNITY
Start: 2019-09-26 | End: 2020-01-29 | Stop reason: SDUPTHER

## 2019-09-26 RX ORDER — ACYCLOVIR 50 MG/G
CREAM TOPICAL
COMMUNITY
Start: 2011-10-24 | End: 2022-06-10

## 2019-09-26 NOTE — ASSESSMENT & PLAN NOTE
Lipid abnormalities are improving with treatment.  Pharmacotherapy as ordered.  Lipids will be reassessed 4 months.

## 2019-09-26 NOTE — PROGRESS NOTES
Rooming Tab(CC,VS,Pt Hx,Fall Screen)  Chief Complaint   Patient presents with   • Depression   • Hyperlipidemia       Subjective 33-year-old here for follow-up.  The patient has a history of a mood disorder and autism.  She has been doing relatively well with her medications.  Patient complains of a lot of allergy symptoms, congested and runny nose.  Vision has obesity and has not been walking and has not been eating properly.  She plans on working on this with her mother's help.  Patient's migraine headaches are better with the Topamax.  She has fewer headaches.  Patient has hyperlipidemia, she is on Lipitor 40 mg daily and takes her medications appropriately.  She has not been eating properly but plans on changing this.    I have reviewed and updated her medications, medical history and problem list during today's office visit.     Patient Care Team:  Mark Hercules MD as PCP - General  Mark Hercules MD as PCP - Claims Attributed  Mark Hercules MD as PCP - Family Medicine    Problem List Tab  Medications Tab  Synopsis Tab  Chart Review Tab  Care Everywhere Tab  Immunizations Tab  Patient History Tab    Social History     Tobacco Use   • Smoking status: Never Smoker   • Smokeless tobacco: Never Used   Substance Use Topics   • Alcohol use: No     Frequency: Never       Review of Systems   Constitutional: Negative for chills, fatigue and fever.   HENT: Negative for nosebleeds.    Eyes: Negative for double vision.   Respiratory: Negative for chest tightness and shortness of breath.    Cardiovascular: Negative for chest pain and palpitations.   Gastrointestinal: Negative for blood in stool.   Genitourinary: Negative for dysuria and hematuria.   Neurological: Negative for dizziness and syncope.   Psychiatric/Behavioral: Negative for depressed mood.       Objective     Rooming Tab(CC,VS,Pt Hx,Fall Screen)  /83 (BP Location: Right arm, Patient Position: Sitting, Cuff Size: Large Adult)   Pulse 91   Resp  12   Wt 101 kg (222 lb)   BMI 36.94 kg/m²     Body mass index is 36.94 kg/m².    Physical Exam   Constitutional: She is oriented to person, place, and time. She appears well-developed and well-nourished. No distress.   Obese, pleasant, no distress   HENT:   Head: Normocephalic and atraumatic.   Nose: Nose normal.   Mouth/Throat: Oropharynx is clear and moist.   Eyes: Conjunctivae, EOM and lids are normal. Pupils are equal, round, and reactive to light.   Neck: Trachea normal and normal range of motion. Neck supple. No JVD present. Carotid bruit is not present. No thyroid mass and no thyromegaly present.   No carotid bruits   Cardiovascular: Normal rate, regular rhythm, normal heart sounds and intact distal pulses.   Pulmonary/Chest: Effort normal and breath sounds normal.   Abdominal:   Obese   Musculoskeletal:   No c/c/e  Ankle braces on both ankles    Neurological: She is alert and oriented to person, place, and time. No cranial nerve deficit.   Skin: Skin is warm and dry.   Psychiatric: She has a normal mood and affect. Her speech is normal and behavior is normal. She is attentive.   Nursing note and vitals reviewed.       Statin Choice Calculator  Data Reviewed:               Lab Results   Component Value Date    BUN 11 07/26/2019    CREATININE 0.60 07/26/2019    EGFRIFNONA 117 07/26/2019     07/26/2019    K 3.6 07/26/2019     07/26/2019    CALCIUM 9.3 07/26/2019    ALBUMIN 3.70 07/26/2019    BILITOT 0.8 07/26/2019    ALKPHOS 87 07/26/2019    AST 20 07/26/2019    ALT 23 07/26/2019    TRIG 128 09/26/2019    HDL 38 (L) 09/26/2019    VLDL 25.6 09/26/2019     (H) 09/26/2019    LDLHDL 3.09 09/26/2019    WBC 10.30 07/26/2019    RBC 4.78 07/26/2019    HCT 41.0 07/26/2019    MCV 85.7 07/26/2019    MCH 29.2 07/26/2019      Assessment/Plan   Order Review Tab  Health Maintenance Tab  Patient Plan/Order Tab  Diagnoses and all orders for this visit:    1. Mixed hyperlipidemia (Primary)  Assessment &  Plan:  Lipid abnormalities are improving with treatment.  Pharmacotherapy as ordered.  Lipids will be reassessed 4 months.    Orders:  -     Lipid Panel    2. Allergic rhinitis, unspecified seasonality, unspecified trigger  Assessment & Plan:  stahist ad tid prn      3. Class 1 obesity due to excess calories with serious comorbidity and body mass index (BMI) of 30.0 to 30.9 in adult  Assessment & Plan:  Worse, needs to exercise and lose  weight      4. Mood disorder (CMS/[facility])  Assessment & Plan:  Psychological condition is improving with treatment.  Continue current treatment regimen.  Regular aerobic exercise.  Psychological condition  will be reassessed at the next regular appointment.      5. Autism  Assessment & Plan:  Psychological condition is unchanged.  Continue current treatment regimen.  Psychological condition  will be reassessed at the next regular appointment.      Other orders  -     Chlorcyclizine-Pseudoephed (STAHIST AD) 25-60 MG tablet; Take 25 mg by mouth 3 (Three) Times a Day.  Dispense: 42 tablet; Refill: 4  -     topiramate (TOPAMAX) 100 MG tablet; Take 1 tablet by mouth Every Night.  Dispense: 30 tablet; Refill: 11  -     sertraline (ZOLOFT) 100 MG tablet; Take 1 tablet by mouth Daily. with food.  Dispense: 90 tablet; Refill: 0      Wrapup Tab  Return in about 4 months (around 1/26/2020) for Recheck.

## 2019-09-27 RX ORDER — ATORVASTATIN CALCIUM 80 MG/1
80 TABLET, FILM COATED ORAL DAILY
Qty: 30 TABLET | Refills: 6 | Status: SHIPPED | OUTPATIENT
Start: 2019-09-27 | End: 2020-04-13

## 2019-09-27 NOTE — PATIENT INSTRUCTIONS
Calorie Counting for Weight Loss  Calories are units of energy. Your body needs a certain amount of calories from food to keep you going throughout the day. When you eat more calories than your body needs, your body stores the extra calories as fat. When you eat fewer calories than your body needs, your body burns fat to get the energy it needs.  Calorie counting means keeping track of how many calories you eat and drink each day. Calorie counting can be helpful if you need to lose weight. If you make sure to eat fewer calories than your body needs, you should lose weight. Ask your health care provider what a healthy weight is for you.  For calorie counting to work, you will need to eat the right number of calories in a day in order to lose a healthy amount of weight per week. A dietitian can help you determine how many calories you need in a day and will give you suggestions on how to reach your calorie goal.  · A healthy amount of weight to lose per week is usually 1-2 lb (0.5-0.9 kg). This usually means that your daily calorie intake should be reduced by 500-750 calories.  · Eating 1,200 - 1,500 calories per day can help most women lose weight.  · Eating 1,500 - 1,800 calories per day can help most men lose weight.  What is my plan?  My goal is to have __________ calories per day.  If I have this many calories per day, I should lose around __________ pounds per week.  What do I need to know about calorie counting?  In order to meet your daily calorie goal, you will need to:  · Find out how many calories are in each food you would like to eat. Try to do this before you eat.  · Decide how much of the food you plan to eat.  · Write down what you ate and how many calories it had. Doing this is called keeping a food log.  To successfully lose weight, it is important to balance calorie counting with a healthy lifestyle that includes regular activity. Aim for 150 minutes of moderate exercise (such as walking) or 75  minutes of vigorous exercise (such as running) each week.  Where do I find calorie information?    The number of calories in a food can be found on a Nutrition Facts label. If a food does not have a Nutrition Facts label, try to look up the calories online or ask your dietitian for help.  Remember that calories are listed per serving. If you choose to have more than one serving of a food, you will have to multiply the calories per serving by the amount of servings you plan to eat. For example, the label on a package of bread might say that a serving size is 1 slice and that there are 90 calories in a serving. If you eat 1 slice, you will have eaten 90 calories. If you eat 2 slices, you will have eaten 180 calories.  How do I keep a food log?  Immediately after each meal, record the following information in your food log:  · What you ate. Don't forget to include toppings, sauces, and other extras on the food.  · How much you ate. This can be measured in cups, ounces, or number of items.  · How many calories each food and drink had.  · The total number of calories in the meal.  Keep your food log near you, such as in a small notebook in your pocket, or use a mobile roland or website. Some programs will calculate calories for you and show you how many calories you have left for the day to meet your goal.  What are some calorie counting tips?    · Use your calories on foods and drinks that will fill you up and not leave you hungry:  ? Some examples of foods that fill you up are nuts and nut butters, vegetables, lean proteins, and high-fiber foods like whole grains. High-fiber foods are foods with more than 5 g fiber per serving.  ? Drinks such as sodas, specialty coffee drinks, alcohol, and juices have a lot of calories, yet do not fill you up.  · Eat nutritious foods and avoid empty calories. Empty calories are calories you get from foods or beverages that do not have many vitamins or protein, such as candy, sweets, and  "soda. It is better to have a nutritious high-calorie food (such as an avocado) than a food with few nutrients (such as a bag of chips).  · Know how many calories are in the foods you eat most often. This will help you calculate calorie counts faster.  · Pay attention to calories in drinks. Low-calorie drinks include water and unsweetened drinks.  · Pay attention to nutrition labels for \"low fat\" or \"fat free\" foods. These foods sometimes have the same amount of calories or more calories than the full fat versions. They also often have added sugar, starch, or salt, to make up for flavor that was removed with the fat.  · Find a way of tracking calories that works for you. Get creative. Try different apps or programs if writing down calories does not work for you.  What are some portion control tips?  · Know how many calories are in a serving. This will help you know how many servings of a certain food you can have.  · Use a measuring cup to measure serving sizes. You could also try weighing out portions on a kitchen scale. With time, you will be able to estimate serving sizes for some foods.  · Take some time to put servings of different foods on your favorite plates, bowls, and cups so you know what a serving looks like.  · Try not to eat straight from a bag or box. Doing this can lead to overeating. Put the amount you would like to eat in a cup or on a plate to make sure you are eating the right portion.  · Use smaller plates, glasses, and bowls to prevent overeating.  · Try not to multitask (for example, watch TV or use your computer) while eating. If it is time to eat, sit down at a table and enjoy your food. This will help you to know when you are full. It will also help you to be aware of what you are eating and how much you are eating.  What are tips for following this plan?  Reading food labels  · Check the calorie count compared to the serving size. The serving size may be smaller than what you are used to " eating.  · Check the source of the calories. Make sure the food you are eating is high in vitamins and protein and low in saturated and trans fats.  Shopping  · Read nutrition labels while you shop. This will help you make healthy decisions before you decide to purchase your food.  · Make a grocery list and stick to it.  Cooking  · Try to cook your favorite foods in a healthier way. For example, try baking instead of frying.  · Use low-fat dairy products.  Meal planning  · Use more fruits and vegetables. Half of your plate should be fruits and vegetables.  · Include lean proteins like poultry and fish.  How do I count calories when eating out?  · Ask for smaller portion sizes.  · Consider sharing an entree and sides instead of getting your own entree.  · If you get your own entree, eat only half. Ask for a box at the beginning of your meal and put the rest of your entree in it so you are not tempted to eat it.  · If calories are listed on the menu, choose the lower calorie options.  · Choose dishes that include vegetables, fruits, whole grains, low-fat dairy products, and lean protein.  · Choose items that are boiled, broiled, grilled, or steamed. Stay away from items that are buttered, battered, fried, or served with cream sauce. Items labeled “crispy” are usually fried, unless stated otherwise.  · Choose water, low-fat milk, unsweetened iced tea, or other drinks without added sugar. If you want an alcoholic beverage, choose a lower calorie option such as a glass of wine or light beer.  · Ask for dressings, sauces, and syrups on the side. These are usually high in calories, so you should limit the amount you eat.  · If you want a salad, choose a garden salad and ask for grilled meats. Avoid extra toppings like sandoval, cheese, or fried items. Ask for the dressing on the side, or ask for olive oil and vinegar or lemon to use as dressing.  · Estimate how many servings of a food you are given. For example, a serving of  cooked rice is ½ cup or about the size of half a baseball. Knowing serving sizes will help you be aware of how much food you are eating at restaurants. The list below tells you how big or small some common portion sizes are based on everyday objects:  ? 1 oz--4 stacked dice.  ? 3 oz--1 deck of cards.  ? 1 tsp--1 die.  ? 1 Tbsp--½ a ping-pong ball.  ? 2 Tbsp--1 ping-pong ball.  ? ½ cup--½ baseball.  ? 1 cup--1 baseball.  Summary  · Calorie counting means keeping track of how many calories you eat and drink each day. If you eat fewer calories than your body needs, you should lose weight.  · A healthy amount of weight to lose per week is usually 1-2 lb (0.5-0.9 kg). This usually means reducing your daily calorie intake by 500-750 calories.  · The number of calories in a food can be found on a Nutrition Facts label. If a food does not have a Nutrition Facts label, try to look up the calories online or ask your dietitian for help.  · Use your calories on foods and drinks that will fill you up, and not on foods and drinks that will leave you hungry.  · Use smaller plates, glasses, and bowls to prevent overeating.  This information is not intended to replace advice given to you by your health care provider. Make sure you discuss any questions you have with your health care provider.  Document Released: 12/18/2006 Document Revised: 11/17/2017 Document Reviewed: 11/17/2017  [employer] Interactive Patient Education © 2019 [employer]      Exercising to Stay Healthy  To become healthy and stay healthy, it is recommended that you do moderate-intensity and vigorous-intensity exercise. You can tell that you are exercising at a moderate intensity if your heart starts beating faster and you start breathing faster but can still hold a conversation. You can tell that you are exercising at a vigorous intensity if you are breathing much harder and faster and cannot hold a conversation while exercising.  Exercising regularly is  important. It has many health benefits, such as:  · Improving overall fitness, flexibility, and endurance.  · Increasing bone density.  · Helping with weight control.  · Decreasing body fat.  · Increasing muscle strength.  · Reducing stress and tension.  · Improving overall health.  How often should I exercise?  Choose an activity that you enjoy, and set realistic goals. Your health care provider can help you make an activity plan that works for you.  Exercise regularly as told by your health care provider. This may include:  · Doing strength training two times a week, such as:  ? Lifting weights.  ? Using resistance bands.  ? Push-ups.  ? Sit-ups.  ? Yoga.  · Doing a certain intensity of exercise for a given amount of time. Choose from these options:  ? A total of 150 minutes of moderate-intensity exercise every week.  ? A total of 75 minutes of vigorous-intensity exercise every week.  ? A mix of moderate-intensity and vigorous-intensity exercise every week.  Children, pregnant women, people who have not exercised regularly, people who are overweight, and older adults may need to talk with a health care provider about what activities are safe to do. If you have a medical condition, be sure to talk with your health care provider before you start a new exercise program.  What are some exercise ideas?  Moderate-intensity exercise ideas include:  · Walking 1 mile (1.6 km) in about 15 minutes.  · Biking.  · Hiking.  · Golfing.  · Dancing.  · Water aerobics.  Vigorous-intensity exercise ideas include:  · Walking 4.5 miles (7.2 km) or more in about 1 hour.  · Jogging or running 5 miles (8 km) in about 1 hour.  · Biking 10 miles (16.1 km) or more in about 1 hour.  · Lap swimming.  · Roller-skating or in-line skating.  · Cross-country skiing.  · Vigorous competitive sports, such as football, basketball, and soccer.  · Jumping rope.  · Aerobic dancing.  What are some everyday activities that can help me to get  exercise?  · Yard work, such as:  ? Pushing a .  ? Raking and bagging leaves.  · Washing your car.  · Pushing a stroller.  · Shoveling snow.  · Gardening.  · Washing windows or floors.  How can I be more active in my day-to-day activities?  · Use stairs instead of an elevator.  · Take a walk during your lunch break.  · If you drive, park your car farther away from your work or school.  · If you take public transportation, get off one stop early and walk the rest of the way.  · Stand up or walk around during all of your indoor phone calls.  · Get up, stretch, and walk around every 30 minutes throughout the day.  · Enjoy exercise with a friend. Support to continue exercising will help you keep a regular routine of activity.  What guidelines can I follow while exercising?  · Before you start a new exercise program, talk with your health care provider.  · Do not exercise so much that you hurt yourself, feel dizzy, or get very short of breath.  · Wear comfortable clothes and wear shoes with good support.  · Drink plenty of water while you exercise to prevent dehydration or heat stroke.  · Work out until your breathing and your heartbeat get faster.  Where to find more information  · [place] Department of Health and Human Services: www.hhs.gov  · Centers for Disease Control and Prevention (CDC): www.cdc.gov  Summary  · Exercising regularly is important. It will improve your overall fitness, flexibility, and endurance.  · Regular exercise also will improve your overall health. It can help you control your weight, reduce stress, and improve your bone density.  · Do not exercise so much that you hurt yourself, feel dizzy, or get very short of breath.  · Before you start a new exercise program, talk with your health care provider.  This information is not intended to replace advice given to you by your health care provider. Make sure you discuss any questions you have with your health care provider.  Document Released:  01/20/2012 Document Revised: 11/08/2018 Document Reviewed: 11/08/2018  [employer] Interactive Patient Education © 2019 [employer]

## 2019-09-27 NOTE — ASSESSMENT & PLAN NOTE
Psychological condition is improving with treatment.  Continue current treatment regimen.  Regular aerobic exercise.  Psychological condition  will be reassessed at the next regular appointment.

## 2019-11-14 ENCOUNTER — TELEPHONE (OUTPATIENT)
Dept: FAMILY MEDICINE CLINIC | Facility: CLINIC | Age: 34
End: 2019-11-14

## 2019-11-14 RX ORDER — NYSTATIN AND TRIAMCINOLONE ACETONIDE 100000; 1 [USP'U]/G; MG/G
OINTMENT TOPICAL 2 TIMES DAILY
Qty: 60 G | Refills: 0 | Status: SHIPPED | OUTPATIENT
Start: 2019-11-14 | End: 2022-06-10

## 2019-11-14 NOTE — TELEPHONE ENCOUNTER
Mom left a voice message that patient has yeast under her breast.  Mom is asking for a cream to be sent in.

## 2019-12-10 RX ORDER — SERTRALINE HYDROCHLORIDE 100 MG/1
TABLET, FILM COATED ORAL
Qty: 90 TABLET | Refills: 0 | Status: SHIPPED | OUTPATIENT
Start: 2019-12-10 | End: 2020-02-10

## 2019-12-27 RX ORDER — LORAZEPAM 1 MG/1
TABLET ORAL
Qty: 30 TABLET | Refills: 3 | Status: SHIPPED | OUTPATIENT
Start: 2019-12-27 | End: 2020-07-15 | Stop reason: SDUPTHER

## 2020-01-29 ENCOUNTER — OFFICE VISIT (OUTPATIENT)
Dept: FAMILY MEDICINE CLINIC | Facility: CLINIC | Age: 35
End: 2020-01-29

## 2020-01-29 VITALS
DIASTOLIC BLOOD PRESSURE: 81 MMHG | WEIGHT: 223.2 LBS | RESPIRATION RATE: 12 BRPM | SYSTOLIC BLOOD PRESSURE: 118 MMHG | HEART RATE: 80 BPM | BODY MASS INDEX: 37.14 KG/M2

## 2020-01-29 DIAGNOSIS — E78.2 MIXED HYPERLIPIDEMIA: Primary | ICD-10-CM

## 2020-01-29 DIAGNOSIS — H61.21 IMPACTED CERUMEN OF RIGHT EAR: ICD-10-CM

## 2020-01-29 DIAGNOSIS — E66.01 CLASS 2 SEVERE OBESITY DUE TO EXCESS CALORIES WITH SERIOUS COMORBIDITY AND BODY MASS INDEX (BMI) OF 37.0 TO 37.9 IN ADULT (HCC): ICD-10-CM

## 2020-01-29 LAB
ALBUMIN SERPL-MCNC: 4.2 G/DL (ref 3.5–5.2)
ALBUMIN/GLOB SERPL: 1.2 G/DL
ALP SERPL-CCNC: 95 U/L (ref 39–117)
ALT SERPL W P-5'-P-CCNC: 26 U/L (ref 1–33)
ANION GAP SERPL CALCULATED.3IONS-SCNC: 14.8 MMOL/L (ref 5–15)
AST SERPL-CCNC: 17 U/L (ref 1–32)
BILIRUB SERPL-MCNC: 0.4 MG/DL (ref 0.2–1.2)
BUN BLD-MCNC: 12 MG/DL (ref 6–20)
BUN/CREAT SERPL: 21.4 (ref 7–25)
CALCIUM SPEC-SCNC: 9.7 MG/DL (ref 8.6–10.5)
CHLORIDE SERPL-SCNC: 97 MMOL/L (ref 98–107)
CHOLEST SERPL-MCNC: 190 MG/DL (ref 0–200)
CO2 SERPL-SCNC: 26.2 MMOL/L (ref 22–29)
CREAT BLD-MCNC: 0.56 MG/DL (ref 0.57–1)
GFR SERPL CREATININE-BSD FRML MDRD: 125 ML/MIN/1.73
GLOBULIN UR ELPH-MCNC: 3.4 GM/DL
GLUCOSE BLD-MCNC: 94 MG/DL (ref 65–99)
HDLC SERPL-MCNC: 40 MG/DL (ref 40–60)
LDLC SERPL CALC-MCNC: 136 MG/DL (ref 0–100)
LDLC/HDLC SERPL: 3.39 {RATIO}
POTASSIUM BLD-SCNC: 4.5 MMOL/L (ref 3.5–5.2)
PROT SERPL-MCNC: 7.6 G/DL (ref 6–8.5)
SODIUM BLD-SCNC: 138 MMOL/L (ref 136–145)
TRIGL SERPL-MCNC: 72 MG/DL (ref 0–150)
VLDLC SERPL-MCNC: 14.4 MG/DL (ref 5–40)

## 2020-01-29 PROCEDURE — 80061 LIPID PANEL: CPT | Performed by: FAMILY MEDICINE

## 2020-01-29 PROCEDURE — 69210 REMOVE IMPACTED EAR WAX UNI: CPT | Performed by: FAMILY MEDICINE

## 2020-01-29 PROCEDURE — 80053 COMPREHEN METABOLIC PANEL: CPT | Performed by: FAMILY MEDICINE

## 2020-01-29 PROCEDURE — 99213 OFFICE O/P EST LOW 20 MIN: CPT | Performed by: FAMILY MEDICINE

## 2020-01-29 NOTE — PROGRESS NOTES
Rooming Tab(CC,VS,Pt Hx,Fall Screen)  Chief Complaint   Patient presents with   • multiple medical conditions       Subjective 34-year-old here with a history of autistic spectrum disorder, who is here for follow-up on her hyperlipidemia.  She is on Lipitor 80 mg daily.  She is not been following a very good diet.  She is not exercising.  She denies any chest pain or dizziness or syncope.  She also complains of wax buildup in her right ear and it is affecting her hearing.  She has a mood disorder and is taking Lamictal and does fairly well with this.  Is on Zoloft as well and denies any depression.    I have reviewed and updated her medications, medical history and problem list during today's office visit.     Patient Care Team:  Mark Hercules MD as PCP - General  Mark Hercules MD as PCP - Family Medicine  Mark Hercules MD as PCP - Claims Attributed    Problem List Tab  Medications Tab  Synopsis Tab  Chart Review Tab  Care Everywhere Tab  Immunizations Tab  Patient History Tab    Social History     Tobacco Use   • Smoking status: Never Smoker   • Smokeless tobacco: Never Used   Substance Use Topics   • Alcohol use: No     Frequency: Never       Review of Systems   Constitutional: Negative for chills, fatigue and fever.   HENT: Positive for hearing loss. Negative for nosebleeds.    Eyes: Negative for double vision.   Respiratory: Negative for chest tightness and shortness of breath.    Cardiovascular: Negative for chest pain and palpitations.   Gastrointestinal: Negative for blood in stool.   Genitourinary: Negative for dysuria and hematuria.   Neurological: Negative for dizziness and syncope.   Psychiatric/Behavioral: Negative for depressed mood.       Objective     Rooming Tab(CC,VS,Pt Hx,Fall Screen)  /81 (BP Location: Right arm, Patient Position: Sitting, Cuff Size: Large Adult)   Pulse 80   Resp 12   Wt 101 kg (223 lb 3.2 oz)   BMI 37.14 kg/m²     Body mass index is 37.14 kg/m².    Physical  Exam   Constitutional: She is oriented to person, place, and time. She appears well-developed and well-nourished. No distress.   Obese pleasant no distress   HENT:   Head: Normocephalic and atraumatic.   Nose: Nose normal.   Mouth/Throat: Oropharynx is clear and moist.   Very poor dentition  Cerumen in impaction in the right ear, cleared with external auditory canal clean, TM clear   Eyes: Pupils are equal, round, and reactive to light. Conjunctivae, EOM and lids are normal.   Neck: Trachea normal and normal range of motion. Neck supple. No JVD present. Carotid bruit is not present. No thyroid mass and no thyromegaly present.   No carotid bruits   Cardiovascular: Normal rate, regular rhythm, normal heart sounds and intact distal pulses.   Pulmonary/Chest: Effort normal and breath sounds normal.   Musculoskeletal:   No c/c/e   Neurological: She is alert and oriented to person, place, and time. No cranial nerve deficit.   Skin: Skin is warm and dry.   Psychiatric: She has a normal mood and affect. Her speech is normal and behavior is normal. She is attentive.   Nursing note and vitals reviewed.       Statin Choice Calculator  Data Reviewed:      Ear Cerumen Removal  Date/Time: 1/29/2020 11:49 AM  Performed by: Mark Hercules MD  Authorized by: Mark Hercules MD     Anesthesia:  Local Anesthetic: none  Location details: right ear  Patient tolerance: Patient tolerated the procedure well with no immediate complications  Procedure type: instrumentation and irrigation   Sedation:  Patient sedated: no                    Lab Results   Component Value Date    BUN 12 01/29/2020    CREATININE 0.56 (L) 01/29/2020    EGFRIFNONA 125 01/29/2020     01/29/2020    K 4.5 01/29/2020    CL 97 (L) 01/29/2020    CALCIUM 9.7 01/29/2020    ALBUMIN 4.20 01/29/2020    BILITOT 0.4 01/29/2020    ALKPHOS 95 01/29/2020    AST 17 01/29/2020    ALT 26 01/29/2020    TRIG 72 01/29/2020    HDL 40 01/29/2020    VLDL 14.4 01/29/2020    LDL  136 (H) 01/29/2020    LDLHDL 3.39 01/29/2020      Assessment/Plan   Order Review Tab  Health Maintenance Tab  Patient Plan/Order Tab  Diagnoses and all orders for this visit:    1. Mixed hyperlipidemia (Primary)  Assessment & Plan:  Lipid abnormalities are improving with treatment.  Nutritional counseling was provided. and Pharmacotherapy as ordered.  Lipids will be reassessed 4 months.  LDL goal is less than 100, she is maxed out on Lipitor and I do not want to add Zetia to her regimen.  I think she needs to work on her diet and weight loss and exercise above all else for this.    Orders:  -     Comprehensive Metabolic Panel  -     Lipid Panel    2. Impacted cerumen of right ear  Assessment & Plan:  Cerumen was removed with flushing and with alligator forceps.  TM and external auditory canal are clear.  The patient tolerated procedure although she did have some crying with the episode of cleaning    Orders:  -     Ear Cerumen Removal    3. Class 2 severe obesity due to excess calories with serious comorbidity and body mass index (BMI) of 37.0 to 37.9 in adult (CMS/[facility])  Assessment & Plan:  Obesity is worsening.  Discussed the patient's BMI.  The BMI is above average; BMI management plan is completed.  General weight loss/lifestyle modification strategies discussed (elicit support from others; identify saboteurs; non-food rewards, etc).  Regular aerobic exercise program discussed.   Needs exercise routine, recommend she join the [employer] and also needs to eat a diet high in fiber and vegetables, low in carbohydrate and leaner cuts of meat        Wrapup Tab  Return in about 4 months (around 5/29/2020) for Recheck.

## 2020-01-31 NOTE — ASSESSMENT & PLAN NOTE
Obesity is worsening.  Discussed the patient's BMI.  The BMI is above average; BMI management plan is completed.  General weight loss/lifestyle modification strategies discussed (elicit support from others; identify saboteurs; non-food rewards, etc).  Regular aerobic exercise program discussed.   Needs exercise routine, recommend she join the [employer] and also needs to eat a diet high in fiber and vegetables, low in carbohydrate and leaner cuts of meat

## 2020-01-31 NOTE — ASSESSMENT & PLAN NOTE
Lipid abnormalities are improving with treatment.  Nutritional counseling was provided. and Pharmacotherapy as ordered.  Lipids will be reassessed 4 months.  LDL goal is less than 100, she is maxed out on Lipitor and I do not want to add Zetia to her regimen.  I think she needs to work on her diet and weight loss and exercise above all else for this.

## 2020-01-31 NOTE — ASSESSMENT & PLAN NOTE
Cerumen was removed with flushing and with alligator forceps.  TM and external auditory canal are clear.  The patient tolerated procedure although she did have some crying with the episode of cleaning

## 2020-02-10 RX ORDER — LAMOTRIGINE 100 MG/1
TABLET ORAL
Qty: 60 TABLET | Refills: 10 | Status: SHIPPED | OUTPATIENT
Start: 2020-02-10 | End: 2020-07-17

## 2020-02-10 RX ORDER — PANTOPRAZOLE SODIUM 40 MG/1
TABLET, DELAYED RELEASE ORAL
Qty: 30 TABLET | Refills: 3 | Status: SHIPPED | OUTPATIENT
Start: 2020-02-10 | End: 2020-06-11

## 2020-02-10 RX ORDER — SERTRALINE HYDROCHLORIDE 100 MG/1
TABLET, FILM COATED ORAL
Qty: 90 TABLET | Refills: 0 | Status: SHIPPED | OUTPATIENT
Start: 2020-02-10 | End: 2020-05-12

## 2020-03-02 RX ORDER — IBUPROFEN 800 MG/1
TABLET ORAL
Qty: 270 TABLET | Refills: 0 | Status: SHIPPED | OUTPATIENT
Start: 2020-03-02 | End: 2020-05-12

## 2020-04-09 RX ORDER — TIZANIDINE 4 MG/1
TABLET ORAL
Qty: 60 TABLET | Refills: 8 | Status: SHIPPED | OUTPATIENT
Start: 2020-04-09 | End: 2020-07-15 | Stop reason: SDUPTHER

## 2020-04-09 RX ORDER — TOPIRAMATE 100 MG/1
TABLET, FILM COATED ORAL
Qty: 67 TABLET | Refills: 4 | Status: SHIPPED | OUTPATIENT
Start: 2020-04-09 | End: 2021-01-19 | Stop reason: SDUPTHER

## 2020-04-13 RX ORDER — ATORVASTATIN CALCIUM 80 MG/1
TABLET, FILM COATED ORAL
Qty: 30 TABLET | Refills: 5 | Status: SHIPPED | OUTPATIENT
Start: 2020-04-13 | End: 2020-10-09

## 2020-05-12 RX ORDER — SERTRALINE HYDROCHLORIDE 100 MG/1
TABLET, FILM COATED ORAL
Qty: 90 TABLET | Refills: 0 | Status: SHIPPED | OUTPATIENT
Start: 2020-05-12 | End: 2020-07-13

## 2020-05-12 RX ORDER — IBUPROFEN 800 MG/1
TABLET ORAL
Qty: 270 TABLET | Refills: 0 | Status: SHIPPED | OUTPATIENT
Start: 2020-05-12 | End: 2020-08-10

## 2020-06-11 RX ORDER — PANTOPRAZOLE SODIUM 40 MG/1
TABLET, DELAYED RELEASE ORAL
Qty: 30 TABLET | Refills: 2 | Status: SHIPPED | OUTPATIENT
Start: 2020-06-11 | End: 2020-09-09

## 2020-07-13 RX ORDER — SERTRALINE HYDROCHLORIDE 100 MG/1
TABLET, FILM COATED ORAL
Qty: 90 TABLET | Refills: 0 | Status: SHIPPED | OUTPATIENT
Start: 2020-07-13 | End: 2020-10-09

## 2020-07-15 RX ORDER — LORAZEPAM 1 MG/1
1 TABLET ORAL EVERY 8 HOURS PRN
Qty: 30 TABLET | Refills: 3 | Status: SHIPPED | OUTPATIENT
Start: 2020-07-15 | End: 2021-08-11 | Stop reason: SDUPTHER

## 2020-07-15 RX ORDER — TIZANIDINE 4 MG/1
4 TABLET ORAL EVERY 6 HOURS PRN
Qty: 60 TABLET | Refills: 1 | Status: SHIPPED | OUTPATIENT
Start: 2020-07-15 | End: 2022-02-21 | Stop reason: SDUPTHER

## 2020-07-15 NOTE — TELEPHONE ENCOUNTER
pts mother called in two refills.                 S[facility] - [place], [place] - [address] - [phone]  - [phone] FX      pts mother can be contacted at [phone]

## 2020-07-17 ENCOUNTER — OFFICE VISIT (OUTPATIENT)
Dept: FAMILY MEDICINE CLINIC | Facility: CLINIC | Age: 35
End: 2020-07-17

## 2020-07-17 ENCOUNTER — LAB (OUTPATIENT)
Dept: FAMILY MEDICINE CLINIC | Facility: CLINIC | Age: 35
End: 2020-07-17

## 2020-07-17 VITALS
HEART RATE: 80 BPM | TEMPERATURE: 97.7 F | BODY MASS INDEX: 34.25 KG/M2 | RESPIRATION RATE: 12 BRPM | DIASTOLIC BLOOD PRESSURE: 82 MMHG | WEIGHT: 205.8 LBS | SYSTOLIC BLOOD PRESSURE: 121 MMHG

## 2020-07-17 DIAGNOSIS — R73.9 HYPERGLYCEMIA: ICD-10-CM

## 2020-07-17 DIAGNOSIS — E66.09 CLASS 1 OBESITY DUE TO EXCESS CALORIES WITH SERIOUS COMORBIDITY AND BODY MASS INDEX (BMI) OF 34.0 TO 34.9 IN ADULT: ICD-10-CM

## 2020-07-17 DIAGNOSIS — G89.29 CHRONIC MIDLINE THORACIC BACK PAIN: ICD-10-CM

## 2020-07-17 DIAGNOSIS — M25.571 CHRONIC PAIN OF BOTH ANKLES: ICD-10-CM

## 2020-07-17 DIAGNOSIS — M54.6 CHRONIC MIDLINE THORACIC BACK PAIN: ICD-10-CM

## 2020-07-17 DIAGNOSIS — M54.6 MIDLINE THORACIC BACK PAIN, UNSPECIFIED CHRONICITY: Primary | ICD-10-CM

## 2020-07-17 DIAGNOSIS — F39 MOOD DISORDER (HCC): ICD-10-CM

## 2020-07-17 DIAGNOSIS — M25.572 CHRONIC PAIN OF BOTH ANKLES: ICD-10-CM

## 2020-07-17 DIAGNOSIS — E78.2 MIXED HYPERLIPIDEMIA: ICD-10-CM

## 2020-07-17 DIAGNOSIS — G89.29 CHRONIC PAIN OF BOTH ANKLES: ICD-10-CM

## 2020-07-17 LAB
ALBUMIN SERPL-MCNC: 4.3 G/DL (ref 3.5–5.2)
ALBUMIN/GLOB SERPL: 1.3 G/DL
ALP SERPL-CCNC: 74 U/L (ref 39–117)
ALT SERPL W P-5'-P-CCNC: 23 U/L (ref 1–33)
ANION GAP SERPL CALCULATED.3IONS-SCNC: 8.7 MMOL/L (ref 5–15)
AST SERPL-CCNC: 17 U/L (ref 1–32)
BILIRUB SERPL-MCNC: 0.3 MG/DL (ref 0–1.2)
BUN SERPL-MCNC: 12 MG/DL (ref 6–20)
BUN/CREAT SERPL: 17.6 (ref 7–25)
CALCIUM SPEC-SCNC: 10.1 MG/DL (ref 8.6–10.5)
CHLORIDE SERPL-SCNC: 102 MMOL/L (ref 98–107)
CHOLEST SERPL-MCNC: 193 MG/DL (ref 0–200)
CO2 SERPL-SCNC: 26.3 MMOL/L (ref 22–29)
CREAT SERPL-MCNC: 0.68 MG/DL (ref 0.57–1)
GFR SERPL CREATININE-BSD FRML MDRD: 100 ML/MIN/1.73
GLOBULIN UR ELPH-MCNC: 3.3 GM/DL
GLUCOSE SERPL-MCNC: 103 MG/DL (ref 65–99)
HDLC SERPL-MCNC: 37 MG/DL (ref 40–60)
LDLC SERPL CALC-MCNC: 132 MG/DL (ref 0–100)
LDLC/HDLC SERPL: 3.57 {RATIO}
POTASSIUM SERPL-SCNC: 4.4 MMOL/L (ref 3.5–5.2)
PROT SERPL-MCNC: 7.6 G/DL (ref 6–8.5)
SODIUM SERPL-SCNC: 137 MMOL/L (ref 136–145)
TRIGL SERPL-MCNC: 120 MG/DL (ref 0–150)
VLDLC SERPL-MCNC: 24 MG/DL (ref 5–40)

## 2020-07-17 PROCEDURE — 80053 COMPREHEN METABOLIC PANEL: CPT | Performed by: FAMILY MEDICINE

## 2020-07-17 PROCEDURE — 80061 LIPID PANEL: CPT | Performed by: FAMILY MEDICINE

## 2020-07-17 PROCEDURE — 99214 OFFICE O/P EST MOD 30 MIN: CPT | Performed by: FAMILY MEDICINE

## 2020-07-17 RX ORDER — TRAZODONE HYDROCHLORIDE 50 MG/1
50 TABLET ORAL NIGHTLY
Qty: 30 TABLET | Refills: 5 | Status: SHIPPED | OUTPATIENT
Start: 2020-07-17 | End: 2021-01-07

## 2020-07-17 RX ORDER — LAMOTRIGINE 150 MG/1
150 TABLET ORAL 2 TIMES DAILY
Qty: 60 TABLET | Refills: 5 | Status: SHIPPED | OUTPATIENT
Start: 2020-07-17 | End: 2021-01-07

## 2020-07-17 NOTE — PROGRESS NOTES
Rooming Tab(CC,VS,Pt Hx,Fall Screen)  Chief Complaint   Patient presents with   • Hyperlipidemia   • Anxiety   • Depression       Subjective 34-year-old here for her follow-up.  The patient has been losing weight by using Slim fast.  She is been compelled recently to lose more weight.  She is on Lamictal 100 mg twice daily for her moodiness and probably still needs a higher dose according to her mother.  She still has outburst of anger.  Patient has multiple Mohs, one on her back and several on her left leg.  Her mother wants these to be evaluated.  Patient complains of bilateral ankle pain and now has lace up ankle braces, [employer] that helped both of her ankles.  She got these from Dr. jim and wants a new pair because these are wearing out.  Patient has complaints of thoracic back pain on both sides is been going on now for several months.  Patient complains of chronic insomnia    I have reviewed and updated her medications, medical history and problem list during today's office visit.     Patient Care Team:  Mark Hercules MD as PCP - General  Mark Hercules MD as PCP - Family Medicine  Mark Hercules MD as PCP - Claims Attributed    Problem List Tab  Medications Tab  Synopsis Tab  Chart Review Tab  Care Everywhere Tab  Immunizations Tab  Patient History Tab    Social History     Tobacco Use   • Smoking status: Never Smoker   • Smokeless tobacco: Never Used   Substance Use Topics   • Alcohol use: No     Frequency: Never       Review of Systems   Constitutional: Negative for chills, fatigue and fever.   HENT: Negative for nosebleeds.    Eyes: Negative for double vision.   Respiratory: Negative for chest tightness and shortness of breath.    Cardiovascular: Negative for chest pain and palpitations.   Gastrointestinal: Negative for blood in stool.   Genitourinary: Negative for dysuria and hematuria.   Musculoskeletal: Positive for arthralgias and back pain.        Bilateral ankle pain   Neurological:  Negative for dizziness and syncope.   Psychiatric/Behavioral: Positive for agitation and behavioral problems. Negative for depressed mood.       Objective     Rooming Tab(CC,VS,Pt Hx,Fall Screen)  /82   Pulse 80   Temp 97.7 °F (36.5 °C)   Resp 12   Wt 93.4 kg (205 lb 12.8 oz)   BMI 34.25 kg/m²     Body mass index is 34.25 kg/m².    Physical Exam   Constitutional: She is oriented to person, place, and time. She appears well-developed and well-nourished. No distress.   Excellent weight loss.   HENT:   Head: Normocephalic and atraumatic.   Nose: Nose normal.   Mouth/Throat: Oropharynx is clear and moist.   Eyes: Pupils are equal, round, and reactive to light. Conjunctivae, EOM and lids are normal.   Neck: Trachea normal and normal range of motion. Neck supple. No JVD present. Carotid bruit is not present. No thyroid mass and no thyromegaly present.   No carotid bruits   Cardiovascular: Normal rate, regular rhythm, normal heart sounds and intact distal pulses.   Pulmonary/Chest: Effort normal and breath sounds normal.   Genitourinary:   Genitourinary Comments: Large breasted with rounding shoulders   Musculoskeletal:   Bilateral ankle braces in place.  Tender thoracic spine.                                     Neurological: She is alert and oriented to person, place, and time. No cranial nerve deficit.   Skin: Skin is warm and dry.   Psychiatric: She has a normal mood and affect. Her speech is normal and behavior is normal. She is attentive.   Nursing note and vitals reviewed.       Statin Choice Calculator  Data Reviewed:               Lab Results   Component Value Date    BUN 12 07/17/2020    CREATININE 0.68 07/17/2020    EGFRIFNONA 100 07/17/2020     07/17/2020    K 4.4 07/17/2020     07/17/2020    CALCIUM 10.1 07/17/2020    ALBUMIN 4.30 07/17/2020    BILITOT 0.3 07/17/2020    ALKPHOS 74 07/17/2020    AST 17 07/17/2020    ALT 23 07/17/2020    TRIG 120 07/17/2020    HDL 37 (L) 07/17/2020    VLDL  24 07/17/2020     (H) 07/17/2020    LDLHDL 3.57 07/17/2020      Assessment/Plan   Order Review Tab  Health Maintenance Tab  Patient Plan/Order Tab  Diagnoses and all orders for this visit:    1. Midline thoracic back pain, unspecified chronicity (Primary)  -     Ambulatory Referral to Physical Therapy    2. Mixed hyperlipidemia  Assessment & Plan:  Lipid abnormalities are improving with treatment.  Pharmacotherapy as ordered.  Lipids will be reassessed 4 months.    Orders:  -     Comprehensive Metabolic Panel  -     Lipid Panel    3. Hyperglycemia  Assessment & Plan:  Excellent weight loss, continue low-carb diet      4. Class 1 obesity due to excess calories with serious comorbidity and body mass index (BMI) of 34.0 to 34.9 in adult  Assessment & Plan:  Excellent weight loss, continue      5. Chronic pain of both ankles  Assessment & Plan:  Continue with bilateral ankle braces.  I wrote a prescription      6. Mood disorder (CMS/[facility])  Assessment & Plan:  Unchanged.  Increase Lamictal 250 mg twice daily      7. Chronic midline thoracic back pain  Assessment & Plan:  This is likely from neck pull from her large breasts that are not very well supported.  She needs to be wearing sports bra hose or support bras and have better posturing.  I also would recommend physical therapy.      Other orders  -     lamoTRIgine (LaMICtal) 150 MG tablet; Take 1 tablet by mouth 2 (Two) Times a Day.  Dispense: 60 tablet; Refill: 5  -     traZODone (DESYREL) 50 MG tablet; Take 1 tablet by mouth Every Night.  Dispense: 30 tablet; Refill: 5      Wrapup Tab  Return in about 3 months (around 10/17/2020) for Recheck.

## 2020-07-20 PROBLEM — M54.6 CHRONIC MIDLINE THORACIC BACK PAIN: Status: ACTIVE | Noted: 2020-07-20

## 2020-07-20 PROBLEM — G89.29 CHRONIC MIDLINE THORACIC BACK PAIN: Status: ACTIVE | Noted: 2020-07-20

## 2020-07-20 NOTE — ASSESSMENT & PLAN NOTE
This is likely from neck pull from her large breasts that are not very well supported.  She needs to be wearing sports bra hose or support bras and have better posturing.  I also would recommend physical therapy.

## 2020-07-27 ENCOUNTER — TREATMENT (OUTPATIENT)
Dept: PHYSICAL THERAPY | Facility: CLINIC | Age: 35
End: 2020-07-27

## 2020-07-27 DIAGNOSIS — M54.6 MIDLINE THORACIC BACK PAIN, UNSPECIFIED CHRONICITY: Primary | ICD-10-CM

## 2020-07-27 PROCEDURE — 97162 PT EVAL MOD COMPLEX 30 MIN: CPT | Performed by: PHYSICAL THERAPIST

## 2020-07-27 PROCEDURE — 97110 THERAPEUTIC EXERCISES: CPT | Performed by: PHYSICAL THERAPIST

## 2020-07-27 NOTE — PROGRESS NOTES
Physical Therapy Initial Evaluation and Plan of Care    Patient: Elyssa Olivarez   : 1985  Diagnosis/ICD-10 Code:  Midline thoracic back pain, unspecified chronicity [M54.6]  Referring practitioner: Mark Hercules MD    Subjective Evaluation    History of Present Illness  Mechanism of injury: Pt is a 34 year old female with reports of off and on upper to middle thoracic spine pain over the past 6 months and reports recent worsening. Reports inability to ambulate, stand, or sit longer than 10-15 min without sitting in a cushioned chair. PMH includes autism    Subjective comment: Modified Oswestry: 56% Quality of life: good    Pain  Current pain ratin  At best pain ratin  At worst pain ratin  Location: upper to mid thoracic spine   Quality: sharp  Relieving factors: change in position and rest (sitting in cushioned seat)  Aggravating factors: ambulation, prolonged positioning, standing and sleeping  Progression: worsening    Social Support  Lives in: one-story house  Lives with: parents    Hand dominance: right    Diagnostic Tests  No diagnostic tests performed    Treatments  No previous or current treatments  Patient Goals  Patient goals for therapy: decreased pain and increased strength             Objective          Static Posture     Head  Forward.    Shoulders  Rounded.    Scapulae  Left protracted and right protracted.    Postural Observations  Seated posture: poor  Standing posture: poor        Tenderness   Cervical Spine   Tenderness in the spinous process.     Additional Tenderness Details  TTP lower cervical to mid thoracic SP     Cervical/Thoracic Screen   Cervical range of motion within normal limits  Thoracic range of motion within normal limits    Neurological Testing     Sensation   Cervical/Thoracic   Left   Intact: light touch    Right   Intact: light touch    Active Range of Motion   Left Shoulder   Normal active range of motion    Right Shoulder   Normal active range of  motion    Strength/Myotome Testing     Left Shoulder     Planes of Motion   Flexion: 4   Abduction: 4   External rotation at 0°: 4   Internal rotation at 0°: 4     Isolated Muscles   Lower trapezius: 3-   Middle trapezius: 3   Serratus anterior: 3     Right Shoulder     Planes of Motion   Flexion: 4   Abduction: 4   External rotation at 0°: 4   Internal rotation at 0°: 4     Isolated Muscles   Lower trapezius: 3-   Middle trapezius: 3   Serratus anterior: 3     Cervical Flexibility Comments:   Mild tightness UT, levator, pec mm           Assessment & Plan     Assessment  Impairments: activity intolerance, impaired physical strength, lacks appropriate home exercise program and pain with function  Assessment details: Pt is a 34 year old female who reports ~6 month h/o on and off mid thoracic spine pain. Pt reports difficulty with prolonged positioning, standing, albulation, and sitting without a supported back. Pain improved when sitting in a cushioned chair. Pt presents to skilled PT with impaired posture, middle thoracic spine pain, and decreased shoulder and scapular strength. Pt would benefit from skilled PT to address the above findings and improve pt's ease with functional mobility and return to PLOF.    Barriers to therapy: Pt has autism, but father is able to aid with HEP  Prognosis: good  Functional Limitations: sleeping, walking, uncomfortable because of pain, sitting and standing  Goals  Plan Goals: ST.Pt will report reduction in worst pain level to <6/10  in 2 weeks for improved ease with ADLs, ambulation, and sitting.   2. Pt will demo good tolerance and compliance with HEP in 2 weeks.  3. Pt will demo improved postural awareness and correction in 2 weeks for decreased spinal stress.      LT.Pt will be independent with HEP in 6 weeks for self management and prevention of re-occurrence.   2.Pt will improve strength of B shoulder mm to grossly 4+/5 in 6 weeks for improved ease with prolonged  positioning and ADLs.  3.Pt will improve strength of B scap mm to grossly 4-/5 in 6 weeks for improved postural stabilization.   4.Pt will report reduction in worst pain level to <4/10 in 6 weeks for improved quality of life and return to PLOF.       Plan  Therapy options: will be seen for skilled physical therapy services  Planned modality interventions: cryotherapy, thermotherapy (hydrocollator packs), electrical stimulation/Russian stimulation, traction and ultrasound  Planned therapy interventions: flexibility, home exercise program, manual therapy, neuromuscular re-education, soft tissue mobilization, spinal/joint mobilization, strengthening, stretching, therapeutic activities and postural training  Frequency: 1x week (1-2/ per week )  Duration in weeks: 6  Treatment plan discussed with: patient          Timed:           Therapeutic Exercise:    10     mins  84904;       Un-Timed:  Mod Eval     35     Mins  63054          Timed Treatment:   10   mins   Total Treatment:     45   mins    PT SIGNATURE: Fatemeh Concepcion, PT       DATE TREATMENT INITIATED: 7/27/2020    Medicare Initial Certification  Certification Period: 10/25/2020  I certify that the therapy services are furnished while this patient is under my care.  The services outlined above are required by this patient, and will be reviewed every 90 days.     PHYSICIAN: Mark Hercules MD      DATE:     Please sign and return via fax to [phone].. Thank you, [facility].

## 2020-08-04 ENCOUNTER — TREATMENT (OUTPATIENT)
Dept: PHYSICAL THERAPY | Facility: CLINIC | Age: 35
End: 2020-08-04

## 2020-08-04 DIAGNOSIS — M54.6 MIDLINE THORACIC BACK PAIN, UNSPECIFIED CHRONICITY: Primary | ICD-10-CM

## 2020-08-04 PROCEDURE — 97110 THERAPEUTIC EXERCISES: CPT | Performed by: PHYSICAL THERAPIST

## 2020-08-04 PROCEDURE — G0283 ELEC STIM OTHER THAN WOUND: HCPCS | Performed by: PHYSICAL THERAPIST

## 2020-08-04 PROCEDURE — 97140 MANUAL THERAPY 1/> REGIONS: CPT | Performed by: PHYSICAL THERAPIST

## 2020-08-04 NOTE — PROGRESS NOTES
Physical Therapy Daily Progress Note  Visit: 2          Patient: Elyssa Olivarez   : 1985  Diagnosis/ICD-10 Code:  Midline thoracic back pain, unspecified chronicity [M54.6]  Referring practitioner: Mark Hercules MD  Date of Initial Visit: Type: THERAPY  Noted: 2020  Today's Date: 2020      Subjective     Elyssa Olivarez reports: increase in neck pain today. Believes she may have slept wrong and is having a hard time holding her head up. States this started yesterday.     Objective   See Exercise, Manual, and Modality Logs for complete treatment.       Assessment/Plan   Pt presents to skilled PT for first treatment session since IE. Reviewed HEP with pt requiring consistent cues for form and instruction. Attempted STM and passive stretching to cervical spine, which pt reported significant TTP. Cervical spine and lumbar spine stretches performed and updated HEP. IFC with MHP provided at end of session for mm relaxation and pain relief.            Timed:         Manual Therapy:    15     mins  22692;     Therapeutic Exercise:    25     mins  74594;         Un-Timed:  Electrical Stimulation:    15     mins  24065 ( );      Timed Treatment:   40   mins   Total Treatment:     55   mins  Fatemeh Concepcion PT  Physical Therapist

## 2020-08-10 RX ORDER — IBUPROFEN 800 MG/1
TABLET ORAL
Qty: 270 TABLET | Refills: 0 | Status: SHIPPED | OUTPATIENT
Start: 2020-08-10 | End: 2020-10-09

## 2020-08-11 ENCOUNTER — TREATMENT (OUTPATIENT)
Dept: PHYSICAL THERAPY | Facility: CLINIC | Age: 35
End: 2020-08-11

## 2020-08-11 DIAGNOSIS — M54.6 MIDLINE THORACIC BACK PAIN, UNSPECIFIED CHRONICITY: Primary | ICD-10-CM

## 2020-08-11 PROCEDURE — G0283 ELEC STIM OTHER THAN WOUND: HCPCS | Performed by: PHYSICAL THERAPIST

## 2020-08-11 PROCEDURE — 97110 THERAPEUTIC EXERCISES: CPT | Performed by: PHYSICAL THERAPIST

## 2020-08-11 NOTE — PROGRESS NOTES
Physical Therapy Daily Progress Note  Visit: 3          Patient: Elyssa Olivarez   : 1985  Diagnosis/ICD-10 Code:  Midline thoracic back pain, unspecified chronicity [M54.6]  Referring practitioner: Mark Hercules MD  Date of Initial Visit: Type: THERAPY  Noted: 2020  Today's Date: 2020      Subjective     Elyssa Olivarez reports: neck is feeling better. Pain is lower T/L spine today, but feels overall better today.     Objective   See Exercise, Manual, and Modality Logs for complete treatment.       Assessment/Plan    Pt able to tolerate initiation of core stabilization and scap strengthening today without adverse reaction. Consistent verbal and tactile cuing required for form and technique. Modalities performed for pain relief post session.        Timed:           Therapeutic Exercise:    38     mins  66613;       Un-Timed:  Electrical Stimulation:    15     mins  95381 ( );    Timed Treatment:   38   mins   Total Treatment:     53   mins  Fatemeh Concepcion PT  Physical Therapist

## 2020-08-18 ENCOUNTER — TREATMENT (OUTPATIENT)
Dept: PHYSICAL THERAPY | Facility: CLINIC | Age: 35
End: 2020-08-18

## 2020-08-18 DIAGNOSIS — M54.6 MIDLINE THORACIC BACK PAIN, UNSPECIFIED CHRONICITY: Primary | ICD-10-CM

## 2020-08-18 PROCEDURE — 97110 THERAPEUTIC EXERCISES: CPT | Performed by: PHYSICAL THERAPIST

## 2020-08-18 PROCEDURE — G0283 ELEC STIM OTHER THAN WOUND: HCPCS | Performed by: PHYSICAL THERAPIST

## 2020-08-18 NOTE — PROGRESS NOTES
Physical Therapy Daily Progress Note  Visit: 4          Patient: Elyssa Olivarez   : 1985  Diagnosis/ICD-10 Code:  Midline thoracic back pain, unspecified chronicity [M54.6]  Referring practitioner: Mark Hercules MD  Date of Initial Visit: Type: THERAPY  Noted: 2020  Today's Date: 2020      Subjective     Elyssa Olivarez reports: feeling good today. No new complaints and pain is mild.     Objective   See Exercise, Manual, and Modality Logs for complete treatment.       Assessment/Plan    Pt arrived late today, therefore performed new exercises from last visit for review today. Pt required min verbal cuing for form and instruction. No adverse reaction.        Timed:           Therapeutic Exercise:    30     mins  31523;       Un-Timed:  Electrical Stimulation:    15     mins  36040 ( );    Timed Treatment:   30   mins   Total Treatment:     45   mins  Fatemeh Concepcion PT  Physical Therapist

## 2020-08-27 ENCOUNTER — TREATMENT (OUTPATIENT)
Dept: PHYSICAL THERAPY | Facility: CLINIC | Age: 35
End: 2020-08-27

## 2020-08-27 DIAGNOSIS — M54.6 MIDLINE THORACIC BACK PAIN, UNSPECIFIED CHRONICITY: Primary | ICD-10-CM

## 2020-08-27 PROCEDURE — 97110 THERAPEUTIC EXERCISES: CPT | Performed by: PHYSICAL THERAPIST

## 2020-08-27 PROCEDURE — G0283 ELEC STIM OTHER THAN WOUND: HCPCS | Performed by: PHYSICAL THERAPIST

## 2020-08-27 NOTE — PROGRESS NOTES
Re-Assessment / Re-Certification      Patient: Elyssa Olivarez   : 1985  Diagnosis/ICD-10 Code:  Midline thoracic back pain, unspecified chronicity [M54.6]  Referring practitioner: Mark Hercules MD  Date of Initial Visit: Type: THERAPY  Noted: 2020  Today's Date: 2020  Patient seen for 5 sessions      Subjective:   Elyssa Olivarez reports: Feeling much improved and decreased intensity and frequency of pain and symptoms. States HEP helps to ease symptoms when they occur.   Subjective Questionnaire: Oswestry: 16% impairment  Clinical Progress: improved  Home Program Compliance: Yes  Treatment has included: therapeutic exercise, manual therapy, electrical stimulation and moist heat    Subjective Evaluation    Pain  At worst pain ratin         Objective          Postural Observations  Seated posture: fair  Standing posture: fair        Strength/Myotome Testing     Left Shoulder     Planes of Motion   Flexion: 4+   Abduction: 4   External rotation at 0°: 4   Internal rotation at 0°: 4+     Isolated Muscles   Lower trapezius: 3+   Middle trapezius: 3+   Serratus anterior: 3+     Right Shoulder     Planes of Motion   Flexion: 4+   Abduction: 4   External rotation at 0°: 4   Internal rotation at 0°: 4+     Isolated Muscles   Lower trapezius: 3+   Middle trapezius: 3+   Serratus anterior: 3+       Assessment & Plan     Assessment  Impairments: activity intolerance, impaired physical strength, lacks appropriate home exercise program and pain with function  Assessment details: Pt has attended 5 visits in skilled PT.   Prognosis: good  Functional Limitations: sleeping, walking, sitting and standing  Goals  Plan Goals: ST.Pt will report reduction in worst pain level to <6/10  in 2 weeks for improved ease with ADLs, ambulation, and sitting.  MET  2. Pt will demo good tolerance and compliance with HEP in 2 weeks. MET  3. Pt will demo improved postural awareness and correction in 2 weeks for  decreased spinal stress. MET      LT.Pt will be independent with HEP in 6 weeks for self management and prevention of re-occurrence.  PROGRESSING  2.Pt will improve strength of B shoulder mm to grossly 4+/5 in 6 weeks for improved ease with prolonged positioning and ADLs. PROGRESSING  3.Pt will improve strength of B scap mm to grossly 4-/5 in 6 weeks for improved postural stabilization.  PROGRESSING  4.Pt will report reduction in worst pain level to <4/10 in 6 weeks for improved quality of life and return to PLOF.  PROGRESSING    Plan  Therapy options: will be seen for skilled physical therapy services  Planned modality interventions: cryotherapy, thermotherapy (hydrocollator packs), electrical stimulation/Russian stimulation, traction and ultrasound  Planned therapy interventions: flexibility, home exercise program, manual therapy, neuromuscular re-education, soft tissue mobilization, spinal/joint mobilization, strengthening, stretching, therapeutic activities and postural training  Frequency: 1x week (1-2/ per week )  Duration in weeks: 4  Treatment plan discussed with: patient      Progress toward previous goals: Partially Met        Recommendations: Continue as planned  Timeframe: 1 month  Frequency: 1x per week  Prognosis to achieve goals: good    PT Signature: Fatemeh Concepcion PT      Based upon review of the patient's progress and continued therapy plan, it is my medical opinion that Elyssa Olivarez should continue physical therapy treatment at [facility]  [address] [place] [address] [phone].    Signature: __________________________________  Mark Hercules MD    Timed:           Therapeutic Exercise:    40     mins  54810;         Un-Timed:  Electrical Stimulation:    15     mins  52489 ( );      Timed Treatment:   55   mins   Total Treatment:     55   mins

## 2020-09-03 ENCOUNTER — TREATMENT (OUTPATIENT)
Dept: PHYSICAL THERAPY | Facility: CLINIC | Age: 35
End: 2020-09-03

## 2020-09-03 DIAGNOSIS — M54.6 MIDLINE THORACIC BACK PAIN, UNSPECIFIED CHRONICITY: Primary | ICD-10-CM

## 2020-09-03 PROCEDURE — G0283 ELEC STIM OTHER THAN WOUND: HCPCS | Performed by: PHYSICAL THERAPIST

## 2020-09-03 PROCEDURE — 97110 THERAPEUTIC EXERCISES: CPT | Performed by: PHYSICAL THERAPIST

## 2020-09-03 NOTE — PROGRESS NOTES
Physical Therapy Daily Progress Note  Visit: 6          Patient: Elyssa Olivarez   : 1985  Diagnosis/ICD-10 Code:  Midline thoracic back pain, unspecified chronicity [M54.6]  Referring practitioner: Mark Hercules MD  Date of Initial Visit: Type: THERAPY  Noted: 2020  Today's Date: 9/3/2020      Subjective     Elyssa Olivarez reports: feeling good today. No new complaints and pain is less.     Objective   See Exercise, Manual, and Modality Logs for complete treatment.       Assessment/Plan    Progressed UE and core strengthening today without adverse reaction. Pt requires consistent cues for form and instruction.        Timed:           Therapeutic Exercise:    38     mins  10701;       Un-Timed:  Electrical Stimulation:    15     mins  13133 ( );    Timed Treatment:   38   mins   Total Treatment:     53   mins  Fatemeh Concepcion PT  Physical Therapist

## 2020-09-09 RX ORDER — PANTOPRAZOLE SODIUM 40 MG/1
TABLET, DELAYED RELEASE ORAL
Qty: 28 TABLET | Refills: 6 | Status: SHIPPED | OUTPATIENT
Start: 2020-09-09 | End: 2021-03-17 | Stop reason: SDUPTHER

## 2020-09-10 ENCOUNTER — TREATMENT (OUTPATIENT)
Dept: PHYSICAL THERAPY | Facility: CLINIC | Age: 35
End: 2020-09-10

## 2020-09-10 DIAGNOSIS — M54.6 MIDLINE THORACIC BACK PAIN, UNSPECIFIED CHRONICITY: Primary | ICD-10-CM

## 2020-09-10 PROCEDURE — 97110 THERAPEUTIC EXERCISES: CPT | Performed by: PHYSICAL THERAPIST

## 2020-09-10 PROCEDURE — G0283 ELEC STIM OTHER THAN WOUND: HCPCS | Performed by: PHYSICAL THERAPIST

## 2020-09-10 NOTE — PROGRESS NOTES
Physical Therapy Daily Progress Note  Visit: 7          Patient: Elyssa Olivarez   : 1985  Diagnosis/ICD-10 Code:  Midline thoracic back pain, unspecified chronicity [M54.6]  Referring practitioner: Mark Hercules MD  Date of Initial Visit: Type: THERAPY  Noted: 2020  Today's Date: 9/10/2020      Subjective     Elyssa Olivarez reports: feeling good today and pain is decreasing.    Objective   See Exercise, Manual, and Modality Logs for complete treatment.       Assessment/Plan  Progressed core stabilization today with additional exercises and previous exercises performed sitting edge of low mat table. Pt demo improved form today with less cuing.       Timed:           Therapeutic Exercise:    45     mins  58699;       Un-Timed:  Electrical Stimulation:    15     mins  43177 ( );      Timed Treatment:   45   mins   Total Treatment:     60   mins  Fatemeh Concepcion PT  Physical Therapist

## 2020-09-17 ENCOUNTER — TREATMENT (OUTPATIENT)
Dept: PHYSICAL THERAPY | Facility: CLINIC | Age: 35
End: 2020-09-17

## 2020-09-17 DIAGNOSIS — M54.6 MIDLINE THORACIC BACK PAIN, UNSPECIFIED CHRONICITY: Primary | ICD-10-CM

## 2020-09-17 PROCEDURE — G0283 ELEC STIM OTHER THAN WOUND: HCPCS | Performed by: PHYSICAL THERAPIST

## 2020-09-17 PROCEDURE — 97110 THERAPEUTIC EXERCISES: CPT | Performed by: PHYSICAL THERAPIST

## 2020-09-17 NOTE — PROGRESS NOTES
Physical Therapy Daily Progress Note  Visit: 8          Patient: Elyssa Olivarez   : 1985  Diagnosis/ICD-10 Code:  Midline thoracic back pain, unspecified chronicity [M54.6]  Referring practitioner: Mark Hercules MD  Date of Initial Visit: Type: THERAPY  Noted: 2020  Today's Date: 2020      Subjective     Elyssa Olivarez reports: feeling good and possibly ready for d/c to HEP. No longer reporting functional deficits.     Objective   See Exercise, Manual, and Modality Logs for complete treatment.       Assessment/Plan    Pt demo good understanding of HEP. Reviewed today. Pt is ready for trial of HEP. Pt to call in next 30 days if she needs to return due to symptoms returning, otherwise will d/c to HEP.        Timed:           Therapeutic Exercise:    38     mins  40412;         Un-Timed:  Electrical Stimulation:    15     mins  55519 ( );        Timed Treatment:   38   mins   Total Treatment:     53   mins  Fatemeh Concepcion PT  Physical Therapist

## 2020-10-09 RX ORDER — SERTRALINE HYDROCHLORIDE 100 MG/1
TABLET, FILM COATED ORAL
Qty: 90 TABLET | Refills: 0 | Status: SHIPPED | OUTPATIENT
Start: 2020-10-09 | End: 2021-01-07

## 2020-10-09 RX ORDER — IBUPROFEN 800 MG/1
TABLET ORAL
Qty: 270 TABLET | Refills: 0 | Status: SHIPPED | OUTPATIENT
Start: 2020-10-09 | End: 2021-01-07

## 2020-10-09 RX ORDER — ATORVASTATIN CALCIUM 80 MG/1
TABLET, FILM COATED ORAL
Qty: 30 TABLET | Refills: 4 | Status: SHIPPED | OUTPATIENT
Start: 2020-10-09 | End: 2021-03-09 | Stop reason: SDUPTHER

## 2020-10-19 ENCOUNTER — OFFICE VISIT (OUTPATIENT)
Dept: FAMILY MEDICINE CLINIC | Facility: CLINIC | Age: 35
End: 2020-10-19

## 2020-10-19 ENCOUNTER — LAB (OUTPATIENT)
Dept: FAMILY MEDICINE CLINIC | Facility: CLINIC | Age: 35
End: 2020-10-19

## 2020-10-19 VITALS
HEART RATE: 75 BPM | BODY MASS INDEX: 33.99 KG/M2 | WEIGHT: 204 LBS | OXYGEN SATURATION: 97 % | DIASTOLIC BLOOD PRESSURE: 80 MMHG | TEMPERATURE: 98.4 F | HEIGHT: 65 IN | SYSTOLIC BLOOD PRESSURE: 120 MMHG | RESPIRATION RATE: 16 BRPM

## 2020-10-19 DIAGNOSIS — E78.2 MIXED HYPERLIPIDEMIA: Primary | ICD-10-CM

## 2020-10-19 DIAGNOSIS — F39 MOOD DISORDER (HCC): ICD-10-CM

## 2020-10-19 DIAGNOSIS — E66.09 CLASS 1 OBESITY DUE TO EXCESS CALORIES WITH SERIOUS COMORBIDITY AND BODY MASS INDEX (BMI) OF 34.0 TO 34.9 IN ADULT: ICD-10-CM

## 2020-10-19 DIAGNOSIS — G89.29 CHRONIC MIDLINE THORACIC BACK PAIN: ICD-10-CM

## 2020-10-19 DIAGNOSIS — M54.6 CHRONIC MIDLINE THORACIC BACK PAIN: ICD-10-CM

## 2020-10-19 DIAGNOSIS — R73.9 HYPERGLYCEMIA: ICD-10-CM

## 2020-10-19 LAB
CHOLEST SERPL-MCNC: 192 MG/DL (ref 0–200)
GLUCOSE P FAST SERPL-MCNC: 109 MG/DL (ref 74–106)
HDLC SERPL-MCNC: 39 MG/DL (ref 40–60)
LDLC SERPL CALC-MCNC: 114 MG/DL (ref 0–100)
LDLC/HDLC SERPL: 2.79 {RATIO}
TRIGL SERPL-MCNC: 221 MG/DL (ref 0–150)
VLDLC SERPL-MCNC: 39 MG/DL (ref 5–40)

## 2020-10-19 PROCEDURE — 99213 OFFICE O/P EST LOW 20 MIN: CPT | Performed by: FAMILY MEDICINE

## 2020-10-19 PROCEDURE — 80061 LIPID PANEL: CPT | Performed by: FAMILY MEDICINE

## 2020-10-19 PROCEDURE — 82947 ASSAY GLUCOSE BLOOD QUANT: CPT | Performed by: FAMILY MEDICINE

## 2020-10-19 RX ORDER — LAMOTRIGINE 100 MG/1
TABLET ORAL
COMMUNITY
Start: 2020-07-22 | End: 2020-10-19

## 2020-10-19 NOTE — PROGRESS NOTES
Rooming Tab(CC,VS,Pt Hx,Fall Screen)  Chief Complaint   Patient presents with   • Hyperlipidemia       Subjective 34-year-old here that has a history of autistic spectrum disorder who is here for follow-up of her weight and other medical problems.  The patient has not been following the diet as strongly as she had before and has only lost a pound since her last visit.  At least she is not gained any of her weight back.  She is going to maintain her weight and make another push for more weight loss soon.  For the patient's chronic back pain, she went to physical therapy and actually got quite a bit of help and it feels a lot better.  The patient has hyperlipidemia and is going to recheck her cholesterol today, she takes Lipitor 80 mg daily.  The patient has hyperglycemia and is trying to continue to keep her carbohydrate intake down but does not check her sugars.  I have reviewed and updated her medications, medical history and problem list during today's office visit.     Patient Care Team:  Mark Hercules MD as PCP - General  Mark Hercules MD as PCP - Family Medicine  Mark Hercules MD as PCP - Claims Attributed    Problem List Tab  Medications Tab  Synopsis Tab  Chart Review Tab  Care Everywhere Tab  Immunizations Tab  Patient History Tab    Social History     Tobacco Use   • Smoking status: Never Smoker   • Smokeless tobacco: Never Used   Substance Use Topics   • Alcohol use: No     Frequency: Never       Review of Systems   Constitutional: Negative for chills, fatigue and fever.   HENT: Negative for congestion and nosebleeds.    Eyes: Negative for double vision.   Respiratory: Negative for chest tightness and shortness of breath.    Cardiovascular: Negative for chest pain and palpitations.   Gastrointestinal: Negative for abdominal pain and blood in stool.   Endocrine: Negative for polydipsia and polyuria.   Genitourinary: Negative for dysuria and hematuria.   Musculoskeletal: Positive for arthralgias  "and gait problem.   Neurological: Negative for dizziness and syncope.   Psychiatric/Behavioral: Negative for depressed mood.       Objective     Rooming Tab(CC,VS,Pt Hx,Fall Screen)  /80 (BP Location: Right arm)   Pulse 75   Temp 98.4 °F (36.9 °C)   Resp 16   Ht 165.1 cm (65\")   Wt 92.5 kg (204 lb)   SpO2 97%   BMI 33.95 kg/m²     Body mass index is 33.95 kg/m².    Physical Exam  Vitals signs and nursing note reviewed.   Constitutional:       General: She is not in acute distress.     Appearance: She is well-developed.      Comments: Obese pleasant female in no acute distress   HENT:      Head: Normocephalic and atraumatic.      Right Ear: Tympanic membrane and ear canal normal.      Left Ear: Tympanic membrane and ear canal normal.      Nose: Nose normal.      Mouth/Throat:      Mouth: Mucous membranes are moist.      Pharynx: Oropharynx is clear.   Eyes:      General: Lids are normal.      Extraocular Movements: Extraocular movements intact.      Conjunctiva/sclera: Conjunctivae normal.      Pupils: Pupils are equal, round, and reactive to light.   Neck:      Musculoskeletal: Normal range of motion and neck supple.      Thyroid: No thyroid mass or thyromegaly.      Vascular: No carotid bruit or JVD.      Trachea: Trachea normal.      Comments: No carotid bruits  Cardiovascular:      Rate and Rhythm: Normal rate and regular rhythm.      Heart sounds: Normal heart sounds.   Pulmonary:      Effort: Pulmonary effort is normal.      Breath sounds: Normal breath sounds.   Musculoskeletal:      Comments: No c/c/e   Skin:     General: Skin is warm and dry.   Neurological:      General: No focal deficit present.      Mental Status: She is alert and oriented to person, place, and time.      Cranial Nerves: No cranial nerve deficit.   Psychiatric:         Attention and Perception: She is attentive.         Mood and Affect: Mood normal.         Speech: Speech normal.         Behavior: Behavior normal.        "   Statin Choice Calculator  Data Reviewed:               Lab Results   Component Value Date    TRIG 221 (H) 10/19/2020    HDL 39 (L) 10/19/2020    VLDL 39 10/19/2020     (H) 10/19/2020    LDLHDL 2.79 10/19/2020      Assessment/Plan   Order Review Tab  Health Maintenance Tab  Patient Plan/Order Tab  Diagnoses and all orders for this visit:    1. Mixed hyperlipidemia (Primary)  Assessment & Plan:  Lipid abnormalities are improving with treatment.  Pharmacotherapy as ordered.  Lipids will be reassessed in 6 months.    Orders:  -     Lipid panel    2. Hyperglycemia  Assessment & Plan:  I continue to recommend exercise and low carbohydrate intake.    Orders:  -     Glucose, Fasting    3. Class 1 obesity due to excess calories with serious comorbidity and body mass index (BMI) of 34.0 to 34.9 in adult  Assessment & Plan:  Obesity is improving with lifestyle modifications.  Discussed the patient's BMI.  The BMI is above average; BMI management plan is completed.  General weight loss/lifestyle modification strategies discussed (elicit support from others; identify saboteurs; non-food rewards, etc).  Regular aerobic exercise program discussed.      4. Chronic midline thoracic back pain  Assessment & Plan:  Seems to have improved with physical therapy.       5. Mood disorder (CMS/[facility])  Assessment & Plan:  I think this has improved, however I think it could use more improvement so we will increase her Lamictal 150 mg twice daily        Wrapup Tab  Return in about 3 months (around 1/19/2021) for Recheck.

## 2020-10-20 NOTE — ASSESSMENT & PLAN NOTE
I think this has improved, however I think it could use more improvement so we will increase her Lamictal 150 mg twice daily

## 2020-11-11 ENCOUNTER — DOCUMENTATION (OUTPATIENT)
Dept: PHYSICAL THERAPY | Facility: CLINIC | Age: 35
End: 2020-11-11

## 2020-11-11 DIAGNOSIS — M54.6 MIDLINE THORACIC BACK PAIN, UNSPECIFIED CHRONICITY: Primary | ICD-10-CM

## 2020-11-11 NOTE — PROGRESS NOTES
Discharge Summary  Discharge Summary from Physical Therapy Report      Patient: Elyssa Olivarez   : 1985  Diagnosis/ICD-10 Code:  Midline thoracic back pain, unspecified chronicity [M54.6]      Discharge Status of Patient: See Progress Note dated 2020    Goals: All Met    Discharge Plan: Continue with current home exercise program as instructed    Comments Pt demo good understanding of HEP. Reviewed today. Pt is ready for trial of HEP. Pt to call in next 30 days if she needs to return due to symptoms returning, otherwise will d/c to HEP.       Fatemeh Concepcion, PT  Physical Therapist

## 2021-01-07 RX ORDER — SERTRALINE HYDROCHLORIDE 100 MG/1
TABLET, FILM COATED ORAL
Qty: 90 TABLET | Refills: 0 | Status: SHIPPED | OUTPATIENT
Start: 2021-01-07 | End: 2021-04-07 | Stop reason: SDUPTHER

## 2021-01-07 RX ORDER — IBUPROFEN 800 MG/1
TABLET ORAL
Qty: 270 TABLET | Refills: 0 | Status: SHIPPED | OUTPATIENT
Start: 2021-01-07 | End: 2021-04-07 | Stop reason: SDUPTHER

## 2021-01-07 RX ORDER — TRAZODONE HYDROCHLORIDE 50 MG/1
TABLET ORAL
Qty: 30 TABLET | Refills: 4 | Status: SHIPPED | OUTPATIENT
Start: 2021-01-07 | End: 2021-06-09 | Stop reason: SDUPTHER

## 2021-01-07 RX ORDER — LAMOTRIGINE 150 MG/1
TABLET ORAL
Qty: 60 TABLET | Refills: 4 | Status: SHIPPED | OUTPATIENT
Start: 2021-01-07 | End: 2021-06-09 | Stop reason: SDUPTHER

## 2021-01-19 ENCOUNTER — LAB (OUTPATIENT)
Dept: FAMILY MEDICINE CLINIC | Facility: CLINIC | Age: 36
End: 2021-01-19

## 2021-01-19 ENCOUNTER — OFFICE VISIT (OUTPATIENT)
Dept: FAMILY MEDICINE CLINIC | Facility: CLINIC | Age: 36
End: 2021-01-19

## 2021-01-19 VITALS
HEIGHT: 65 IN | DIASTOLIC BLOOD PRESSURE: 80 MMHG | TEMPERATURE: 97.7 F | BODY MASS INDEX: 35.32 KG/M2 | RESPIRATION RATE: 16 BRPM | SYSTOLIC BLOOD PRESSURE: 110 MMHG | HEART RATE: 83 BPM | OXYGEN SATURATION: 97 % | WEIGHT: 212 LBS

## 2021-01-19 DIAGNOSIS — R73.9 HYPERGLYCEMIA: ICD-10-CM

## 2021-01-19 DIAGNOSIS — E66.01 CLASS 2 SEVERE OBESITY DUE TO EXCESS CALORIES WITH SERIOUS COMORBIDITY AND BODY MASS INDEX (BMI) OF 35.0 TO 35.9 IN ADULT (HCC): ICD-10-CM

## 2021-01-19 DIAGNOSIS — F39 MOOD DISORDER (HCC): ICD-10-CM

## 2021-01-19 DIAGNOSIS — E78.2 MIXED HYPERLIPIDEMIA: Primary | ICD-10-CM

## 2021-01-19 DIAGNOSIS — R53.83 FATIGUE, UNSPECIFIED TYPE: ICD-10-CM

## 2021-01-19 LAB
ALBUMIN SERPL-MCNC: 4.1 G/DL (ref 3.5–5.2)
ALBUMIN/GLOB SERPL: 1.3 G/DL
ALP SERPL-CCNC: 97 U/L (ref 39–117)
ALT SERPL W P-5'-P-CCNC: 20 U/L (ref 1–33)
ANION GAP SERPL CALCULATED.3IONS-SCNC: 11.8 MMOL/L (ref 5–15)
AST SERPL-CCNC: 19 U/L (ref 1–32)
BASOPHILS # BLD AUTO: 0.05 10*3/MM3 (ref 0–0.2)
BASOPHILS NFR BLD AUTO: 0.6 % (ref 0–1.5)
BILIRUB SERPL-MCNC: 0.3 MG/DL (ref 0–1.2)
BUN SERPL-MCNC: 9 MG/DL (ref 6–20)
BUN/CREAT SERPL: 16.4 (ref 7–25)
CALCIUM SPEC-SCNC: 9.6 MG/DL (ref 8.6–10.5)
CHLORIDE SERPL-SCNC: 100 MMOL/L (ref 98–107)
CHOLEST SERPL-MCNC: 206 MG/DL (ref 0–200)
CO2 SERPL-SCNC: 25.2 MMOL/L (ref 22–29)
CREAT SERPL-MCNC: 0.55 MG/DL (ref 0.57–1)
DEPRECATED RDW RBC AUTO: 39.4 FL (ref 37–54)
EOSINOPHIL # BLD AUTO: 0.06 10*3/MM3 (ref 0–0.4)
EOSINOPHIL NFR BLD AUTO: 0.8 % (ref 0.3–6.2)
ERYTHROCYTE [DISTWIDTH] IN BLOOD BY AUTOMATED COUNT: 12.5 % (ref 12.3–15.4)
GFR SERPL CREATININE-BSD FRML MDRD: 127 ML/MIN/1.73
GLOBULIN UR ELPH-MCNC: 3.1 GM/DL
GLUCOSE SERPL-MCNC: 95 MG/DL (ref 65–99)
HCT VFR BLD AUTO: 40.1 % (ref 34–46.6)
HDLC SERPL-MCNC: 37 MG/DL (ref 40–60)
HGB BLD-MCNC: 13 G/DL (ref 12–15.9)
IMM GRANULOCYTES # BLD AUTO: 0.02 10*3/MM3 (ref 0–0.05)
IMM GRANULOCYTES NFR BLD AUTO: 0.3 % (ref 0–0.5)
LDLC SERPL CALC-MCNC: 148 MG/DL (ref 0–100)
LDLC/HDLC SERPL: 3.94 {RATIO}
LYMPHOCYTES # BLD AUTO: 2.52 10*3/MM3 (ref 0.7–3.1)
LYMPHOCYTES NFR BLD AUTO: 31.9 % (ref 19.6–45.3)
MCH RBC QN AUTO: 28.4 PG (ref 26.6–33)
MCHC RBC AUTO-ENTMCNC: 32.4 G/DL (ref 31.5–35.7)
MCV RBC AUTO: 87.7 FL (ref 79–97)
MONOCYTES # BLD AUTO: 0.4 10*3/MM3 (ref 0.1–0.9)
MONOCYTES NFR BLD AUTO: 5.1 % (ref 5–12)
NEUTROPHILS NFR BLD AUTO: 4.86 10*3/MM3 (ref 1.7–7)
NEUTROPHILS NFR BLD AUTO: 61.3 % (ref 42.7–76)
NRBC BLD AUTO-RTO: 0 /100 WBC (ref 0–0.2)
PLATELET # BLD AUTO: 299 10*3/MM3 (ref 140–450)
PMV BLD AUTO: 10.2 FL (ref 6–12)
POTASSIUM SERPL-SCNC: 4.6 MMOL/L (ref 3.5–5.2)
PROT SERPL-MCNC: 7.2 G/DL (ref 6–8.5)
RBC # BLD AUTO: 4.57 10*6/MM3 (ref 3.77–5.28)
SODIUM SERPL-SCNC: 137 MMOL/L (ref 136–145)
TRIGL SERPL-MCNC: 117 MG/DL (ref 0–150)
TSH SERPL DL<=0.05 MIU/L-ACNC: 0.9 UIU/ML (ref 0.27–4.2)
VLDLC SERPL-MCNC: 21 MG/DL (ref 5–40)
WBC # BLD AUTO: 7.91 10*3/MM3 (ref 3.4–10.8)

## 2021-01-19 PROCEDURE — 85025 COMPLETE CBC W/AUTO DIFF WBC: CPT | Performed by: FAMILY MEDICINE

## 2021-01-19 PROCEDURE — 99213 OFFICE O/P EST LOW 20 MIN: CPT | Performed by: FAMILY MEDICINE

## 2021-01-19 PROCEDURE — 80053 COMPREHEN METABOLIC PANEL: CPT | Performed by: FAMILY MEDICINE

## 2021-01-19 PROCEDURE — 80061 LIPID PANEL: CPT | Performed by: FAMILY MEDICINE

## 2021-01-19 PROCEDURE — 84443 ASSAY THYROID STIM HORMONE: CPT | Performed by: FAMILY MEDICINE

## 2021-01-19 RX ORDER — TOPIRAMATE 100 MG/1
100 TABLET, FILM COATED ORAL DAILY
Qty: 30 TABLET | Refills: 4 | COMMUNITY
Start: 2021-01-19 | End: 2021-04-19

## 2021-01-19 NOTE — PROGRESS NOTES
"Rooming Tab(CC,VS,Pt Hx,Fall Screen)  Chief Complaint   Patient presents with   • Hyperlipidemia       Subjective Patient is here for follow-up of her hyperlipidemia.  The patient unfortunately has not getting very much exercise and has not been losing weight.  She has actually gained weight.  She is on Lipitor 80 mg daily because how high her cholesterol was, to improve remarkably but we have had trouble decreasing it to having an LDL less than 100  Patient also has autistic spectrum disorder and she is on Lamictal 150 mg twice daily as well as Zoloft 100 mg daily.  She has some mood disorder and this seems to keep her under pretty good control with her mood and keeps her relatively happy and less anxious.    I have reviewed and updated her medications, medical history and problem list during today's office visit.     Patient Care Team:  Mark Hercules MD as PCP - General  Mark Hercules MD as PCP - Family Medicine    Problem List Tab  Medications Tab  Synopsis Tab  Chart Review Tab  Care Everywhere Tab  Immunizations Tab  Patient History Tab    Social History     Tobacco Use   • Smoking status: Never Smoker   • Smokeless tobacco: Never Used   Substance Use Topics   • Alcohol use: No     Frequency: Never       Review of Systems   Constitutional: Negative for chills, fatigue and fever.   HENT: Negative for nosebleeds.    Eyes: Negative for double vision.   Respiratory: Negative for chest tightness and shortness of breath.    Cardiovascular: Negative for chest pain and palpitations.   Gastrointestinal: Negative for blood in stool.   Genitourinary: Negative for dysuria and hematuria.   Neurological: Negative for dizziness and syncope.   Psychiatric/Behavioral: Negative for self-injury, suicidal ideas and depressed mood.       Objective     Rooming Tab(CC,VS,Pt Hx,Fall Screen)  /80 (BP Location: Left arm)   Pulse 83   Temp 97.7 °F (36.5 °C) (Temporal)   Resp 16   Ht 165.1 cm (65\")   Wt 96.2 kg (212 lb) "   SpO2 97%   BMI 35.28 kg/m²     Body mass index is 35.28 kg/m².    Physical Exam  Vitals signs and nursing note reviewed.   Constitutional:       General: She is not in acute distress.     Appearance: She is well-developed. She is obese.      Comments: Obese female who is pleasant in no acute distress   HENT:      Head: Normocephalic and atraumatic.      Right Ear: Tympanic membrane and ear canal normal.      Left Ear: Tympanic membrane and ear canal normal.      Nose: Nose normal.      Mouth/Throat:      Mouth: Mucous membranes are moist.      Pharynx: Oropharynx is clear.   Eyes:      General: Lids are normal.      Extraocular Movements: Extraocular movements intact.      Conjunctiva/sclera: Conjunctivae normal.      Pupils: Pupils are equal, round, and reactive to light.   Neck:      Musculoskeletal: Normal range of motion and neck supple.      Thyroid: No thyroid mass or thyromegaly.      Vascular: No carotid bruit or JVD.      Trachea: Trachea normal.      Comments: No carotid bruits  Cardiovascular:      Rate and Rhythm: Normal rate and regular rhythm.      Pulses: Normal pulses.      Heart sounds: Normal heart sounds.   Pulmonary:      Effort: Pulmonary effort is normal.      Breath sounds: Normal breath sounds.   Abdominal:      General: Abdomen is flat. Bowel sounds are normal. There is no distension.      Palpations: Abdomen is soft.      Tenderness: There is no abdominal tenderness.   Musculoskeletal:      Comments: No c/c/e   Skin:     General: Skin is warm and dry.   Neurological:      General: No focal deficit present.      Mental Status: She is alert and oriented to person, place, and time.      Cranial Nerves: No cranial nerve deficit.   Psychiatric:         Attention and Perception: She is attentive.         Speech: Speech normal.         Behavior: Behavior normal.          Statin Choice Calculator  Data Reviewed:               Lab Results   Component Value Date    BUN 9 01/19/2021    CREATININE  0.55 (L) 01/19/2021    EGFRIFNONA 127 01/19/2021     01/19/2021    K 4.6 01/19/2021     01/19/2021    CALCIUM 9.6 01/19/2021    ALBUMIN 4.10 01/19/2021    BILITOT 0.3 01/19/2021    ALKPHOS 97 01/19/2021    AST 19 01/19/2021    ALT 20 01/19/2021    TRIG 117 01/19/2021    HDL 37 (L) 01/19/2021    VLDL 21 01/19/2021     (H) 01/19/2021    LDLHDL 3.94 01/19/2021    WBC 7.91 01/19/2021    RBC 4.57 01/19/2021    HCT 40.1 01/19/2021    MCV 87.7 01/19/2021    MCH 28.4 01/19/2021    TSH 0.898 01/19/2021      Assessment/Plan   Order Review Tab  Health Maintenance Tab  Patient Plan/Order Tab  Diagnoses and all orders for this visit:    1. Mixed hyperlipidemia (Primary)  Assessment & Plan:  Lipid abnormalities are unchanged.  Pharmacotherapy as ordered.  Lipids will be reassessed in 6 months.  Add Zetia 10 mg daily    Orders:  -     Comprehensive Metabolic Panel  -     Lipid Panel    2. Hyperglycemia  Assessment & Plan:  Continue with diet and try to increase her exercise and weight loss      3. Mood disorder (CMS/[facility])  Assessment & Plan:  We will continue current medications.  Seems relatively stable.      4. Fatigue, unspecified type  Assessment & Plan:  Exercise and weight loss.  We will check labs    Orders:  -     CBC & Differential  -     TSH  -     CBC Auto Differential    5. Class 2 severe obesity due to excess calories with serious comorbidity and body mass index (BMI) of 35.0 to 35.9 in adult (CMS/HCC)  Assessment & Plan:  She needs to exercise and lose weight      Other orders  -     topiramate (TOPAMAX) 100 MG tablet; Take 1 tablet by mouth Daily.  Dispense: 30 tablet; Refill: 4      Wrapup Tab  Return in about 3 months (around 4/19/2021) for Recheck.

## 2021-01-25 RX ORDER — EZETIMIBE 10 MG/1
10 TABLET ORAL DAILY
Qty: 90 TABLET | Refills: 3 | Status: SHIPPED | OUTPATIENT
Start: 2021-01-25 | End: 2022-02-08 | Stop reason: SDUPTHER

## 2021-01-26 NOTE — ASSESSMENT & PLAN NOTE
Lipid abnormalities are unchanged.  Pharmacotherapy as ordered.  Lipids will be reassessed in 6 months.  Add Zetia 10 mg daily

## 2021-03-09 RX ORDER — ATORVASTATIN CALCIUM 80 MG/1
80 TABLET, FILM COATED ORAL DAILY
Qty: 30 TABLET | Refills: 4 | Status: SHIPPED | OUTPATIENT
Start: 2021-03-09 | End: 2021-07-06

## 2021-03-11 ENCOUNTER — TELEPHONE (OUTPATIENT)
Dept: FAMILY MEDICINE CLINIC | Facility: CLINIC | Age: 36
End: 2021-03-11

## 2021-03-17 RX ORDER — PANTOPRAZOLE SODIUM 40 MG/1
40 TABLET, DELAYED RELEASE ORAL DAILY
Qty: 28 TABLET | Refills: 6 | Status: SHIPPED | OUTPATIENT
Start: 2021-03-17 | End: 2021-09-10 | Stop reason: SDUPTHER

## 2021-04-07 RX ORDER — IBUPROFEN 800 MG/1
800 TABLET ORAL 3 TIMES DAILY
Qty: 270 TABLET | Refills: 0 | Status: SHIPPED | OUTPATIENT
Start: 2021-04-07 | End: 2021-06-07

## 2021-04-07 RX ORDER — SERTRALINE HYDROCHLORIDE 100 MG/1
100 TABLET, FILM COATED ORAL DAILY
Qty: 90 TABLET | Refills: 0 | Status: SHIPPED | OUTPATIENT
Start: 2021-04-07 | End: 2021-06-07

## 2021-04-07 NOTE — TELEPHONE ENCOUNTER
setraline refill 100mg 1poqd  #90    Ibuprofen 800mg 1 TID  #270       Do you want to contine Ibuprofen?    She is [facility] patient made appt with sheng on 4/19

## 2021-04-19 ENCOUNTER — LAB (OUTPATIENT)
Dept: FAMILY MEDICINE CLINIC | Facility: CLINIC | Age: 36
End: 2021-04-19

## 2021-04-19 ENCOUNTER — OFFICE VISIT (OUTPATIENT)
Dept: FAMILY MEDICINE CLINIC | Facility: CLINIC | Age: 36
End: 2021-04-19

## 2021-04-19 VITALS
HEIGHT: 65 IN | DIASTOLIC BLOOD PRESSURE: 80 MMHG | HEART RATE: 71 BPM | WEIGHT: 215.38 LBS | BODY MASS INDEX: 35.88 KG/M2 | TEMPERATURE: 97.8 F | OXYGEN SATURATION: 98 % | RESPIRATION RATE: 16 BRPM | SYSTOLIC BLOOD PRESSURE: 128 MMHG

## 2021-04-19 DIAGNOSIS — E78.2 MIXED HYPERLIPIDEMIA: Primary | ICD-10-CM

## 2021-04-19 DIAGNOSIS — M54.31 SCIATIC NERVE PAIN, RIGHT: ICD-10-CM

## 2021-04-19 DIAGNOSIS — E78.2 MIXED HYPERLIPIDEMIA: ICD-10-CM

## 2021-04-19 PROCEDURE — 80061 LIPID PANEL: CPT | Performed by: PHYSICIAN ASSISTANT

## 2021-04-19 PROCEDURE — 36415 COLL VENOUS BLD VENIPUNCTURE: CPT

## 2021-04-19 PROCEDURE — 99214 OFFICE O/P EST MOD 30 MIN: CPT | Performed by: PHYSICIAN ASSISTANT

## 2021-04-19 RX ORDER — METHYLPREDNISOLONE 4 MG/1
TABLET ORAL
Qty: 1 EACH | Refills: 0 | Status: SHIPPED | OUTPATIENT
Start: 2021-04-19 | End: 2021-07-20

## 2021-04-19 NOTE — PROGRESS NOTES
"Subjective   Elyssa Olivarez is a 35 y.o. female.     Chief Complaint   Patient presents with   • Hyperlipidemia     3 month f/u       /80 (BP Location: Left arm, Patient Position: Sitting, Cuff Size: Adult)   Pulse 71   Temp 97.8 °F (36.6 °C) (Skin)   Resp 16   Ht 165.1 cm (65\")   Wt 97.7 kg (215 lb 6 oz)   SpO2 98%   BMI 35.84 kg/m²     BP Readings from Last 3 Encounters:   04/19/21 128/80   01/19/21 110/80   10/19/20 120/80       Wt Readings from Last 3 Encounters:   04/19/21 97.7 kg (215 lb 6 oz)   01/19/21 96.2 kg (212 lb)   10/19/20 92.5 kg (204 lb)       HPI Patient presents to the clinic for hld follow up and hip/back pain. This is a chronic issue but this feels very different than her typical hip pain. She has pain radiating down to the right knee. No weakness in the leg. No significant back pain. No saddle anaesthesia, bowel or bladder incontinence, or leg weakness.      The following portions of the patient's history were reviewed and updated as appropriate: allergies, current medications, past family history, past medical history, past social history, past surgical history and problem list.    Review of Systems    Objective   Physical Exam  Constitutional:       Appearance: She is well-developed.   HENT:      Head: Normocephalic and atraumatic.   Eyes:      Conjunctiva/sclera: Conjunctivae normal.      Pupils: Pupils are equal, round, and reactive to light.   Cardiovascular:      Rate and Rhythm: Normal rate and regular rhythm.      Heart sounds: No murmur heard.     Pulmonary:      Effort: Pulmonary effort is normal.      Breath sounds: Normal breath sounds.   Abdominal:      General: Bowel sounds are normal.      Palpations: Abdomen is soft.      Tenderness: There is no abdominal tenderness.   Musculoskeletal:         General: Tenderness (left hip pain ttp. ttp over the sciatic notch. ) present. No deformity. Normal range of motion.      Cervical back: Normal range of motion and neck " supple.   Lymphadenopathy:      Cervical: No cervical adenopathy.   Skin:     General: Skin is warm and dry.      Capillary Refill: Capillary refill takes less than 2 seconds.      Findings: No rash.   Neurological:      Mental Status: She is alert and oriented to person, place, and time.      Cranial Nerves: No cranial nerve deficit.   Psychiatric:         Behavior: Behavior normal.         Thought Content: Thought content normal.         Judgment: Judgment normal.           Diagnoses and all orders for this visit:    1. Mixed hyperlipidemia (Primary)  -     Lipid panel; Future    2. Sciatic nerve pain, right    Other orders  -     methylPREDNISolone (MEDROL) 4 MG dose pack; Take as directed on package instructions.  Dispense: 1 each; Refill: 0      Check lipid panel and continue statin- medrol dose pack for sciatic pain.     Return if symptoms worsen or fail to improve.

## 2021-04-20 LAB
CHOLEST SERPL-MCNC: 211 MG/DL (ref 0–200)
HDLC SERPL-MCNC: 42 MG/DL (ref 40–60)
LDLC SERPL CALC-MCNC: 142 MG/DL (ref 0–100)
LDLC/HDLC SERPL: 3.3 {RATIO}
TRIGL SERPL-MCNC: 151 MG/DL (ref 0–150)
VLDLC SERPL-MCNC: 27 MG/DL (ref 5–40)

## 2021-06-07 RX ORDER — IBUPROFEN 800 MG/1
TABLET ORAL
Qty: 270 TABLET | Refills: 0 | Status: SHIPPED | OUTPATIENT
Start: 2021-06-07 | End: 2021-09-02

## 2021-06-07 RX ORDER — SERTRALINE HYDROCHLORIDE 100 MG/1
TABLET, FILM COATED ORAL
Qty: 90 TABLET | Refills: 0 | Status: SHIPPED | OUTPATIENT
Start: 2021-06-07 | End: 2021-09-02

## 2021-06-10 RX ORDER — LAMOTRIGINE 150 MG/1
150 TABLET ORAL 2 TIMES DAILY
Qty: 60 TABLET | Refills: 4 | Status: SHIPPED | OUTPATIENT
Start: 2021-06-10 | End: 2021-11-01

## 2021-06-10 RX ORDER — TRAZODONE HYDROCHLORIDE 50 MG/1
50 TABLET ORAL NIGHTLY
Qty: 30 TABLET | Refills: 4 | Status: SHIPPED | OUTPATIENT
Start: 2021-06-10 | End: 2021-11-01

## 2021-07-06 RX ORDER — ATORVASTATIN CALCIUM 80 MG/1
TABLET, FILM COATED ORAL
Qty: 90 TABLET | Refills: 3 | Status: SHIPPED | OUTPATIENT
Start: 2021-07-06 | End: 2022-05-02

## 2021-07-20 ENCOUNTER — LAB (OUTPATIENT)
Dept: FAMILY MEDICINE CLINIC | Facility: CLINIC | Age: 36
End: 2021-07-20

## 2021-07-20 ENCOUNTER — OFFICE VISIT (OUTPATIENT)
Dept: FAMILY MEDICINE CLINIC | Facility: CLINIC | Age: 36
End: 2021-07-20

## 2021-07-20 VITALS
RESPIRATION RATE: 16 BRPM | DIASTOLIC BLOOD PRESSURE: 76 MMHG | HEART RATE: 76 BPM | HEIGHT: 65 IN | SYSTOLIC BLOOD PRESSURE: 122 MMHG | BODY MASS INDEX: 36.49 KG/M2 | OXYGEN SATURATION: 98 % | WEIGHT: 219 LBS

## 2021-07-20 DIAGNOSIS — E78.2 MIXED HYPERLIPIDEMIA: Primary | ICD-10-CM

## 2021-07-20 DIAGNOSIS — E78.2 MIXED HYPERLIPIDEMIA: ICD-10-CM

## 2021-07-20 DIAGNOSIS — B37.2 CANDIDAL INTERTRIGO: ICD-10-CM

## 2021-07-20 LAB
CHOLEST SERPL-MCNC: 180 MG/DL (ref 0–200)
HDLC SERPL-MCNC: 37 MG/DL (ref 40–60)
LDLC SERPL CALC-MCNC: 123 MG/DL (ref 0–100)
LDLC/HDLC SERPL: 3.26 {RATIO}
TRIGL SERPL-MCNC: 112 MG/DL (ref 0–150)
VLDLC SERPL-MCNC: 20 MG/DL (ref 5–40)

## 2021-07-20 PROCEDURE — 99213 OFFICE O/P EST LOW 20 MIN: CPT | Performed by: PHYSICIAN ASSISTANT

## 2021-07-20 PROCEDURE — 80061 LIPID PANEL: CPT | Performed by: PHYSICIAN ASSISTANT

## 2021-07-20 PROCEDURE — 36415 COLL VENOUS BLD VENIPUNCTURE: CPT

## 2021-07-20 RX ORDER — NYSTATIN 100000 [USP'U]/G
POWDER TOPICAL 3 TIMES DAILY
Qty: 60 G | Refills: 3 | Status: SHIPPED | OUTPATIENT
Start: 2021-07-20 | End: 2022-06-10

## 2021-07-20 NOTE — PROGRESS NOTES
"Subjective   Elyssa Olivarez is a 35 y.o. female.     Chief Complaint   Patient presents with   • Hyperlipidemia     3 month f/u       /76 (BP Location: Right arm, Patient Position: Sitting, Cuff Size: Adult)   Pulse 76   Resp 16   Ht 165.1 cm (65\")   Wt 99.3 kg (219 lb)   SpO2 98%   BMI 36.44 kg/m²     BP Readings from Last 3 Encounters:   07/20/21 122/76   04/19/21 128/80   01/19/21 110/80       Wt Readings from Last 3 Encounters:   07/20/21 99.3 kg (219 lb)   04/19/21 97.7 kg (215 lb 6 oz)   01/19/21 96.2 kg (212 lb)       HPI Patient presents to the clinic for management of hyperlipidemia and lower back pain.  Patient is currently taking lipitor 80mg and zetia 10mg which was started previously by . Doing ok on this medication. Has gained 4 pounds since last visit but has been working on diet. Cut out fatty foods and sugary foods. Mom is working with her .  Patient denies any complaints with this medication including muscle aches or dark-colored urine. Denies chest pain, soa, headaches. Had sciatic nerve pain last visit and was treated with a medrol dose pack. She currently has no more back pain.      The following portions of the patient's history were reviewed and updated as appropriate: allergies, current medications, past family history, past medical history, past social history, past surgical history and problem list.    Review of Systems    Objective   Physical Exam  Constitutional:       Appearance: She is well-developed.   HENT:      Head: Normocephalic and atraumatic.   Eyes:      Conjunctiva/sclera: Conjunctivae normal.      Pupils: Pupils are equal, round, and reactive to light.   Cardiovascular:      Rate and Rhythm: Normal rate and regular rhythm.      Heart sounds: No murmur heard.     Pulmonary:      Effort: Pulmonary effort is normal.      Breath sounds: Normal breath sounds.   Abdominal:      General: Bowel sounds are normal.      Palpations: Abdomen is soft.      " Tenderness: There is no abdominal tenderness.   Musculoskeletal:         General: No deformity. Normal range of motion.      Cervical back: Normal range of motion and neck supple.   Lymphadenopathy:      Cervical: No cervical adenopathy.   Skin:     General: Skin is warm and dry.      Capillary Refill: Capillary refill takes less than 2 seconds.      Findings: No rash.   Neurological:      Mental Status: She is alert and oriented to person, place, and time.      Cranial Nerves: No cranial nerve deficit.   Psychiatric:         Behavior: Behavior normal.         Thought Content: Thought content normal.         Judgment: Judgment normal.           Diagnoses and all orders for this visit:    1. Mixed hyperlipidemia (Primary)  Comments:  check lipid panel.   Orders:  -     Lipid panel; Future    2. Candidal intertrigo  Comments:  nystatin. keep dry    Other orders  -     nystatin (MYCOSTATIN) 255575 UNIT/GM powder; Apply  topically to the appropriate area as directed 3 (Three) Times a Day.  Dispense: 60 g; Refill: 3        Return in about 6 months (around 1/20/2022), or if symptoms worsen or fail to improve.

## 2021-08-11 DIAGNOSIS — F41.1 GENERALIZED ANXIETY DISORDER: Primary | ICD-10-CM

## 2021-08-11 NOTE — TELEPHONE ENCOUNTER
Caller: SHIRLEY MUELLER    Relationship: Mother    Best call back number: [phone]  Medication needed:   Requested Prescriptions     Pending Prescriptions Disp Refills   • LORazepam (ATIVAN) 1 MG tablet 30 tablet 3     Sig: Take 1 tablet by mouth Every 8 (Eight) Hours As Needed for Anxiety.       When do you need the refill by: 3 DAYS    Does the patient have less than a 3 day supply:  [] Yes  [x] No    What is the patient's preferred pharmacy: [facility] [place] [address] [phone] [facility] [phone] FX

## 2021-08-12 RX ORDER — LORAZEPAM 1 MG/1
1 TABLET ORAL EVERY 8 HOURS PRN
Qty: 30 TABLET | Refills: 3 | Status: SHIPPED | OUTPATIENT
Start: 2021-08-12 | End: 2021-09-24 | Stop reason: SDUPTHER

## 2021-09-02 RX ORDER — IBUPROFEN 800 MG/1
TABLET ORAL
Qty: 270 TABLET | Refills: 0 | Status: SHIPPED | OUTPATIENT
Start: 2021-09-02 | End: 2021-12-03

## 2021-09-02 RX ORDER — SERTRALINE HYDROCHLORIDE 100 MG/1
TABLET, FILM COATED ORAL
Qty: 90 TABLET | Refills: 0 | Status: SHIPPED | OUTPATIENT
Start: 2021-09-02 | End: 2021-12-03

## 2021-09-10 RX ORDER — PANTOPRAZOLE SODIUM 40 MG/1
40 TABLET, DELAYED RELEASE ORAL DAILY
Qty: 90 TABLET | Refills: 0 | Status: SHIPPED | OUTPATIENT
Start: 2021-09-10 | End: 2021-12-01

## 2021-09-24 DIAGNOSIS — F41.1 GENERALIZED ANXIETY DISORDER: Primary | ICD-10-CM

## 2021-09-24 NOTE — TELEPHONE ENCOUNTER
Incoming Refill Request      Medication requested (name and dose):   LORazepam (ATIVAN) 1 MG tablet  1 mg, Every 8 Hours PRN     Pharmacy where request should be sent: [facility] [place] [place] - [address] [place] [address] DR - [phone]  - [phone]   [phone]    Additional details provided by patient: PATIENT HAS TWO PILLS LEFT    Best call back number:   [phone]    Does the patient have less than a 3 day supply:  [x] Yes  [] No    Moriah Porter Rep  09/24/21, 11:23 EDT

## 2021-09-27 RX ORDER — LORAZEPAM 1 MG/1
1 TABLET ORAL EVERY 8 HOURS PRN
Qty: 30 TABLET | Refills: 3 | Status: SHIPPED | OUTPATIENT
Start: 2021-09-27 | End: 2022-02-21 | Stop reason: SDUPTHER

## 2021-10-04 ENCOUNTER — TELEPHONE (OUTPATIENT)
Dept: FAMILY MEDICINE CLINIC | Facility: CLINIC | Age: 36
End: 2021-10-04

## 2021-10-04 NOTE — TELEPHONE ENCOUNTER
Caller: SHIRLEY MUELLER    Relationship to patient: Mother    Best call back number: [phone]    Patient is needing: PATIENT'S MOM CALLED AND SAID HER DENTIST HAS BEEN UNDER INVESTIGATION FOR DRUGS AND SHE IS WANTING TO HAVE HER SEE SOMEONE ELSE    THE PATIENT'S MOM IS WANTING TO SEE IF DAMIR PICHARDO HAS A RECOMMENDATION FOR SOMEONE GOOD WITH HANDLING PEOPLE WITH [employer]

## 2021-10-21 ENCOUNTER — LAB (OUTPATIENT)
Dept: FAMILY MEDICINE CLINIC | Facility: CLINIC | Age: 36
End: 2021-10-21

## 2021-10-21 ENCOUNTER — OFFICE VISIT (OUTPATIENT)
Dept: FAMILY MEDICINE CLINIC | Facility: CLINIC | Age: 36
End: 2021-10-21

## 2021-10-21 VITALS
WEIGHT: 223 LBS | TEMPERATURE: 97.7 F | BODY MASS INDEX: 37.15 KG/M2 | OXYGEN SATURATION: 97 % | HEIGHT: 65 IN | RESPIRATION RATE: 16 BRPM | DIASTOLIC BLOOD PRESSURE: 60 MMHG | SYSTOLIC BLOOD PRESSURE: 102 MMHG | HEART RATE: 67 BPM

## 2021-10-21 DIAGNOSIS — E78.2 MIXED HYPERLIPIDEMIA: Primary | ICD-10-CM

## 2021-10-21 DIAGNOSIS — E78.2 MIXED HYPERLIPIDEMIA: ICD-10-CM

## 2021-10-21 LAB
CHOLEST SERPL-MCNC: 202 MG/DL (ref 0–200)
HDLC SERPL-MCNC: 36 MG/DL (ref 40–60)
LDLC SERPL CALC-MCNC: 140 MG/DL (ref 0–100)
LDLC/HDLC SERPL: 3.82 {RATIO}
TRIGL SERPL-MCNC: 143 MG/DL (ref 0–150)
VLDLC SERPL-MCNC: 26 MG/DL (ref 5–40)

## 2021-10-21 PROCEDURE — 80061 LIPID PANEL: CPT | Performed by: PHYSICIAN ASSISTANT

## 2021-10-21 PROCEDURE — 36415 COLL VENOUS BLD VENIPUNCTURE: CPT

## 2021-10-21 PROCEDURE — 99213 OFFICE O/P EST LOW 20 MIN: CPT | Performed by: PHYSICIAN ASSISTANT

## 2021-10-21 NOTE — PROGRESS NOTES
"Subjective   Elyssa Olivarez is a 35 y.o. female.     Chief Complaint   Patient presents with   • Hyperlipidemia     3mo       /60 (BP Location: Left arm, Patient Position: Sitting, Cuff Size: Adult)   Pulse 67   Temp 97.7 °F (36.5 °C) (Temporal)   Resp 16   Ht 165.1 cm (65\")   Wt 101 kg (223 lb)   SpO2 97%   BMI 37.11 kg/m²     BP Readings from Last 3 Encounters:   10/21/21 102/60   07/20/21 122/76   04/19/21 128/80       Wt Readings from Last 3 Encounters:   10/21/21 101 kg (223 lb)   07/20/21 99.3 kg (219 lb)   04/19/21 97.7 kg (215 lb 6 oz)       HPI Here for follow up on hld and also here for special olympic form. She is doing well. Has been watching her fatty food intake. Has been compliant with her cholesterol meds. Lipid panel better last time than ever before. Is in special olympics bowling. Doing well with this.     The following portions of the patient's history were reviewed and updated as appropriate: allergies, current medications, past family history, past medical history, past social history, past surgical history and problem list.    Review of Systems   Constitutional: Negative for activity change, appetite change and fatigue.   HENT: Negative for congestion, rhinorrhea and sore throat.    Eyes: Negative for blurred vision, pain and visual disturbance.   Respiratory: Negative for cough, chest tightness and shortness of breath.    Cardiovascular: Negative for chest pain and leg swelling.   Gastrointestinal: Negative for abdominal pain, blood in stool and nausea.   Endocrine: Negative for polydipsia and polyuria.   Genitourinary: Negative for dysuria and urgency.   Musculoskeletal: Negative for arthralgias and back pain.   Skin: Negative for rash and bruise.   Allergic/Immunologic: Negative for environmental allergies.   Neurological: Negative for headache and confusion.   Hematological: Negative for adenopathy. Does not bruise/bleed easily.   Psychiatric/Behavioral: Negative for " stress. The patient is not nervous/anxious.        Objective   Physical Exam  Constitutional:       Appearance: She is well-developed.   HENT:      Head: Normocephalic and atraumatic.   Eyes:      Conjunctiva/sclera: Conjunctivae normal.      Pupils: Pupils are equal, round, and reactive to light.   Cardiovascular:      Rate and Rhythm: Normal rate and regular rhythm.      Heart sounds: No murmur heard.      Pulmonary:      Effort: Pulmonary effort is normal.      Breath sounds: Normal breath sounds.   Abdominal:      General: Bowel sounds are normal.      Palpations: Abdomen is soft.      Tenderness: There is no abdominal tenderness.   Musculoskeletal:         General: No deformity. Normal range of motion.      Cervical back: Normal range of motion and neck supple.   Lymphadenopathy:      Cervical: No cervical adenopathy.   Skin:     General: Skin is warm and dry.      Capillary Refill: Capillary refill takes less than 2 seconds.      Findings: No rash.   Neurological:      Mental Status: She is alert and oriented to person, place, and time.      Cranial Nerves: No cranial nerve deficit.   Psychiatric:         Behavior: Behavior normal.         Thought Content: Thought content normal.         Judgment: Judgment normal.           Diagnoses and all orders for this visit:    1. Mixed hyperlipidemia (Primary)  -     Lipid panel; Future    Cleared for special olympics without restrictions.     Return if symptoms worsen or fail to improve.

## 2021-11-01 RX ORDER — TRAZODONE HYDROCHLORIDE 50 MG/1
TABLET ORAL
Qty: 30 TABLET | Refills: 4 | Status: SHIPPED | OUTPATIENT
Start: 2021-11-01 | End: 2022-04-04

## 2021-11-01 RX ORDER — LAMOTRIGINE 150 MG/1
TABLET ORAL
Qty: 60 TABLET | Refills: 4 | Status: SHIPPED | OUTPATIENT
Start: 2021-11-01 | End: 2022-04-04

## 2021-12-01 RX ORDER — PANTOPRAZOLE SODIUM 40 MG/1
TABLET, DELAYED RELEASE ORAL
Qty: 90 TABLET | Refills: 2 | Status: SHIPPED | OUTPATIENT
Start: 2021-12-01 | End: 2023-02-15 | Stop reason: SDUPTHER

## 2021-12-03 RX ORDER — IBUPROFEN 800 MG/1
TABLET ORAL
Qty: 270 TABLET | Refills: 0 | Status: SHIPPED | OUTPATIENT
Start: 2021-12-03 | End: 2022-02-04

## 2021-12-03 RX ORDER — SERTRALINE HYDROCHLORIDE 100 MG/1
TABLET, FILM COATED ORAL
Qty: 90 TABLET | Refills: 0 | Status: SHIPPED | OUTPATIENT
Start: 2021-12-03 | End: 2022-02-04

## 2021-12-22 ENCOUNTER — OFFICE VISIT (OUTPATIENT)
Dept: FAMILY MEDICINE CLINIC | Facility: CLINIC | Age: 36
End: 2021-12-22

## 2021-12-22 ENCOUNTER — LAB (OUTPATIENT)
Dept: LAB | Facility: HOSPITAL | Age: 36
End: 2021-12-22

## 2021-12-22 VITALS
OXYGEN SATURATION: 98 % | HEIGHT: 65 IN | TEMPERATURE: 98 F | DIASTOLIC BLOOD PRESSURE: 72 MMHG | SYSTOLIC BLOOD PRESSURE: 114 MMHG | WEIGHT: 225.2 LBS | RESPIRATION RATE: 16 BRPM | HEART RATE: 80 BPM | BODY MASS INDEX: 37.52 KG/M2

## 2021-12-22 DIAGNOSIS — J06.9 URTI (ACUTE UPPER RESPIRATORY INFECTION): Primary | ICD-10-CM

## 2021-12-22 DIAGNOSIS — J06.9 URTI (ACUTE UPPER RESPIRATORY INFECTION): ICD-10-CM

## 2021-12-22 PROCEDURE — 0202U NFCT DS 22 TRGT SARS-COV-2: CPT

## 2021-12-22 PROCEDURE — 99213 OFFICE O/P EST LOW 20 MIN: CPT | Performed by: HOSPITALIST

## 2021-12-22 RX ORDER — CEPHALEXIN 500 MG/1
500 CAPSULE ORAL 2 TIMES DAILY
Qty: 14 CAPSULE | Refills: 0 | Status: SHIPPED | OUTPATIENT
Start: 2021-12-22 | End: 2022-02-21

## 2021-12-22 RX ORDER — METHYLPREDNISOLONE 4 MG/1
TABLET ORAL
Qty: 1 EACH | Refills: 0 | Status: SHIPPED | OUTPATIENT
Start: 2021-12-22 | End: 2022-02-21

## 2021-12-22 NOTE — PROGRESS NOTES
"Alma Olivarez is a 36 y.o. female.     Subjective / HPI  Patient complaining of feeling congested, sore throat, body ache and running fever for last thee to four days. Pt has covid vaccine including booster.     Review of Systems    Objective     /72 (BP Location: Left arm, Patient Position: Sitting, Cuff Size: Adult)   Pulse 80   Temp 98 °F (36.7 °C) (Infrared)   Resp 16   Ht 165.1 cm (65\")   Wt 102 kg (225 lb 3.2 oz)   SpO2 98%   BMI 37.48 kg/m²      Physical Exam  Positive for hyperemia involving the throat, nasal area  Chest .. bilateral entry, NVB.  Procedures       Assessment/Plan   Diagnoses and all orders for this visit:    1. URTI (acute upper respiratory infection) (Primary)  -     Respiratory Panel PCR w/COVID-19(SARS-CoV-2) PATRIZIA/LIEN/LORENA/PAD/COR/MAD/SANDRA In-House, NP Swab in UTM/VTM, 3-4 HR TAT - Swab, Nasopharynx; Future    Other orders  -     methylPREDNISolone (MEDROL) 4 MG dose pack; Take as directed on package instructions.  Dispense: 1 each; Refill: 0  -     cephalexin (Keflex) 500 MG capsule; Take 1 capsule by mouth 2 (Two) Times a Day.  Dispense: 14 capsule; Refill: 0                "

## 2022-02-04 RX ORDER — IBUPROFEN 800 MG/1
TABLET ORAL
Qty: 270 TABLET | Refills: 0 | Status: SHIPPED | OUTPATIENT
Start: 2022-02-04 | End: 2022-05-31

## 2022-02-04 RX ORDER — SERTRALINE HYDROCHLORIDE 100 MG/1
TABLET, FILM COATED ORAL
Qty: 90 TABLET | Refills: 0 | Status: SHIPPED | OUTPATIENT
Start: 2022-02-04 | End: 2022-05-31

## 2022-02-08 RX ORDER — EZETIMIBE 10 MG/1
10 TABLET ORAL DAILY
Qty: 90 TABLET | Refills: 3 | Status: SHIPPED | OUTPATIENT
Start: 2022-02-08 | End: 2022-12-09 | Stop reason: SDUPTHER

## 2022-02-21 ENCOUNTER — LAB (OUTPATIENT)
Dept: FAMILY MEDICINE CLINIC | Facility: CLINIC | Age: 37
End: 2022-02-21

## 2022-02-21 ENCOUNTER — OFFICE VISIT (OUTPATIENT)
Dept: FAMILY MEDICINE CLINIC | Facility: CLINIC | Age: 37
End: 2022-02-21

## 2022-02-21 VITALS
SYSTOLIC BLOOD PRESSURE: 119 MMHG | HEIGHT: 65 IN | DIASTOLIC BLOOD PRESSURE: 70 MMHG | WEIGHT: 223.8 LBS | RESPIRATION RATE: 12 BRPM | OXYGEN SATURATION: 98 % | BODY MASS INDEX: 37.29 KG/M2 | HEART RATE: 88 BPM

## 2022-02-21 DIAGNOSIS — E55.9 VITAMIN D DEFICIENCY: ICD-10-CM

## 2022-02-21 DIAGNOSIS — F41.1 GENERALIZED ANXIETY DISORDER: ICD-10-CM

## 2022-02-21 DIAGNOSIS — Z00.00 MEDICARE ANNUAL WELLNESS VISIT, SUBSEQUENT: ICD-10-CM

## 2022-02-21 DIAGNOSIS — G25.81 RLS (RESTLESS LEGS SYNDROME): ICD-10-CM

## 2022-02-21 DIAGNOSIS — Z00.00 MEDICARE ANNUAL WELLNESS VISIT, SUBSEQUENT: Primary | ICD-10-CM

## 2022-02-21 PROCEDURE — G0439 PPPS, SUBSEQ VISIT: HCPCS | Performed by: PHYSICIAN ASSISTANT

## 2022-02-21 PROCEDURE — 99213 OFFICE O/P EST LOW 20 MIN: CPT | Performed by: PHYSICIAN ASSISTANT

## 2022-02-21 PROCEDURE — 1170F FXNL STATUS ASSESSED: CPT | Performed by: PHYSICIAN ASSISTANT

## 2022-02-21 PROCEDURE — 1160F RVW MEDS BY RX/DR IN RCRD: CPT | Performed by: PHYSICIAN ASSISTANT

## 2022-02-21 RX ORDER — LORAZEPAM 1 MG/1
1 TABLET ORAL EVERY 8 HOURS PRN
Qty: 30 TABLET | Refills: 3 | Status: SHIPPED | OUTPATIENT
Start: 2022-02-21 | End: 2022-03-22 | Stop reason: SDUPTHER

## 2022-02-21 RX ORDER — TIZANIDINE 4 MG/1
4 TABLET ORAL 2 TIMES DAILY
Qty: 180 TABLET | Refills: 0 | Status: SHIPPED | OUTPATIENT
Start: 2022-02-21 | End: 2022-05-22

## 2022-02-21 NOTE — PROGRESS NOTES
The ABCs of the Annual Wellness Visit  Subsequent Medicare Wellness Visit    Chief Complaint   Patient presents with   • Medicare Wellness-subsequent      Subjective    History of Present Illness:  Elyssa Olivarez is a 36 y.o. female who presents for a Subsequent Medicare Wellness Visit. Also presents for management of high cholesterol, mdd, autism.     The following portions of the patient's history were reviewed and   updated as appropriate: allergies, current medications, past family history, past medical history, past social history, past surgical history and problem list.    Compared to one year ago, the patient feels her physical   health is better.    Compared to one year ago, the patient feels her mental   health is the same.    Recent Hospitalizations:  She was not admitted to the hospital during the last year.       Current Medical Providers:  Patient Care Team:  Vimal Moseley PA-C as PCP - General (Physician Assistant)    Outpatient Medications Prior to Visit   Medication Sig Dispense Refill   • acyclovir (ZOVIRAX) 5 % cream ZOVIRAX 5 % CREA     • aspirin 81 MG chewable tablet Chew 81 mg Daily.     • atorvastatin (LIPITOR) 80 MG tablet TAKE ONE TABLET BY MOUTH DAILY 90 tablet 3   • clindamycin-benzoyl peroxide (BENZACLIN) 1-5 % gel BENZACLIN 1-5 % GEL     • ezetimibe (Zetia) 10 MG tablet Take 1 tablet by mouth Daily. For cholesterol 90 tablet 3   • ibuprofen (ADVIL,MOTRIN) 800 MG tablet TAKE ONE TABLET BY MOUTH THREE TIMES A  tablet 0   • lamoTRIgine (LaMICtal) 150 MG tablet TAKE ONE TABLET BY MOUTH TWICE A DAY 60 tablet 4   • nystatin (MYCOSTATIN) 887045 UNIT/GM powder Apply  topically to the appropriate area as directed 3 (Three) Times a Day. 60 g 3   • nystatin-triamcinolone (MYCOLOG) 734484-1.1 UNIT/GM-% ointment Apply  topically to the appropriate area as directed 2 (Two) Times a Day. 60 g 0   • pantoprazole (PROTONIX) 40 MG EC tablet TAKE ONE TABLET BY MOUTH DAILY 90 tablet 2   •  sertraline (ZOLOFT) 100 MG tablet TAKE ONE TABLET BY MOUTH DAILY WITH FOOD 90 tablet 0   • traZODone (DESYREL) 50 MG tablet TAKE ONE TABLET BY MOUTH ONCE NIGHTLY 30 tablet 4   • LORazepam (ATIVAN) 1 MG tablet Take 1 tablet by mouth Every 8 (Eight) Hours As Needed for Anxiety. 30 tablet 3   • tiZANidine (ZANAFLEX) 4 MG tablet Take 1 tablet by mouth Every 6 (Six) Hours As Needed for Muscle Spasms. 60 tablet 1   • cephalexin (Keflex) 500 MG capsule Take 1 capsule by mouth 2 (Two) Times a Day. 14 capsule 0   • methylPREDNISolone (MEDROL) 4 MG dose pack Take as directed on package instructions. 1 each 0     No facility-administered medications prior to visit.       No opioid medication identified on active medication list. I have reviewed chart for other potential  high risk medication/s and harmful drug interactions in the elderly.          Aspirin is on active medication list. Aspirin use is indicated based on review of current medical condition/s. Pros and cons of this therapy have been discussed today. Benefits of this medication outweigh potential harm.  Patient has been encouraged to continue taking this medication.  .      Patient Active Problem List   Diagnosis   • Acne   • Allergic rhinitis   • Autism   • Ankle pain   • Dilated pupil   • Displaced bimalleolar fracture of left lower leg, initial encounter for closed fracture   • Fatigue   • Cholelithiasis   • Gastroesophageal reflux disease   • Headache   • Hyperglycemia   • Hyperlipidemia   • Anxiety disorder   • Depression   • Mood disorder (HCC)   • Obesity   • Thyroid nodule   • Tonic pupillary reaction   • Impacted cerumen of right ear   • Chronic midline thoracic back pain   • Sciatic nerve pain, right   • Candidal intertrigo   • Medicare annual wellness visit, subsequent   • RLS (restless legs syndrome)   • Vitamin D deficiency     Advance Care Planning  Advance Directive is on file.  ACP discussion was held with the patient during this visit. Patient has  "an advance directive in EMR which is still valid.           Objective    Vitals:    02/21/22 1254   BP: 119/70   Pulse: 88   Resp: 12   SpO2: 98%   Weight: 102 kg (223 lb 12.8 oz)   Height: 165.1 cm (65\")     BMI Readings from Last 1 Encounters:   02/21/22 37.24 kg/m²   BMI is above normal parameters. Recommendations include: exercise counseling and nutrition counseling    Does the patient have evidence of cognitive impairment? No    Physical Exam  Vitals and nursing note reviewed.   Constitutional:       General: She is not in acute distress.     Appearance: She is well-developed. She is obese.      Comments: Obese female who is pleasant in no acute distress   HENT:      Head: Normocephalic and atraumatic.      Right Ear: Tympanic membrane and ear canal normal.      Left Ear: Tympanic membrane and ear canal normal.      Nose: Nose normal.      Mouth/Throat:      Mouth: Mucous membranes are moist.      Pharynx: Oropharynx is clear.   Eyes:      General: Lids are normal.      Extraocular Movements: Extraocular movements intact.      Conjunctiva/sclera: Conjunctivae normal.      Pupils: Pupils are equal, round, and reactive to light.   Neck:      Thyroid: No thyroid mass or thyromegaly.      Vascular: No carotid bruit or JVD.      Trachea: Trachea normal.      Comments: No carotid bruits  Cardiovascular:      Rate and Rhythm: Normal rate and regular rhythm.      Pulses: Normal pulses.      Heart sounds: Normal heart sounds. No murmur heard.      Pulmonary:      Effort: Pulmonary effort is normal.      Breath sounds: Normal breath sounds.   Abdominal:      General: Abdomen is flat. Bowel sounds are normal. There is no distension.      Palpations: Abdomen is soft.      Tenderness: There is no abdominal tenderness.   Musculoskeletal:         General: No deformity. Normal range of motion.      Cervical back: Normal range of motion and neck supple.      Comments: No c/c/e   Lymphadenopathy:      Cervical: No cervical " adenopathy.   Skin:     General: Skin is warm and dry.      Capillary Refill: Capillary refill takes less than 2 seconds.      Findings: No rash.   Neurological:      General: No focal deficit present.      Mental Status: She is alert and oriented to person, place, and time.      Cranial Nerves: No cranial nerve deficit.   Psychiatric:         Attention and Perception: She is attentive.         Speech: Speech normal.         Behavior: Behavior normal.         Thought Content: Thought content normal.         Judgment: Judgment normal.                 HEALTH RISK ASSESSMENT    Smoking Status:  Social History     Tobacco Use   Smoking Status Never Smoker   Smokeless Tobacco Never Used     Alcohol Consumption:  Social History     Substance and Sexual Activity   Alcohol Use No     Fall Risk Screen:    [facility] Fall Risk Assessment has not been completed.    Depression Screening:  PHQ-2/PHQ-9 Depression Screening 2/21/2022   Little interest or pleasure in doing things 0   Feeling down, depressed, or hopeless 0   Total Score 0       Health Habits and Functional and Cognitive Screening:  Functional & Cognitive Status 2/21/2022   Do you have difficulty preparing food and eating? No   Do you have difficulty bathing yourself, getting dressed or grooming yourself? No   Do you have difficulty using the toilet? No   Do you have difficulty moving around from place to place? No   Do you have trouble with steps or getting out of a bed or a chair? No   Current Diet Well Balanced Diet   Dental Exam Not up to date   Eye Exam Up to date   Exercise (times per week) 0 times per week   Current Exercises Include Walking   Do you need help using the phone?  No   Are you deaf or do you have serious difficulty hearing?  No   Do you need help with transportation? No   Do you need help shopping? No   Do you need help preparing meals?  No   Do you need help with housework?  No   Do you need help with laundry? No   Do you need help taking your  medications? No   Do you need help managing money? No   Do you ever drive or ride in a car without wearing a seat belt? No   Have you felt unusual stress, anger or loneliness in the last month? No   Who do you live with? Other   If you need help, do you have trouble finding someone available to you? No   Do you have difficulty concentrating, remembering or making decisions? No       Age-appropriate Screening Schedule:  Refer to the list below for future screening recommendations based on patient's age, sex and/or medical conditions. Orders for these recommended tests are listed in the plan section. The patient has been provided with a written plan.    Health Maintenance   Topic Date Due   • TDAP/TD VACCINES (1 - Tdap) Never done   • PAP SMEAR  Never done   • LIPID PANEL  10/21/2022   • INFLUENZA VACCINE  Discontinued              Assessment/Plan   CMS Preventative Services Quick Reference  Risk Factors Identified During Encounter  Depression/Dysphoria  Immunizations Discussed/Encouraged (specific Immunizations; Influenza and COVID19  Obesity/Overweight   The above risks/problems have been discussed with the patient.  Follow up actions/plans if indicated are seen below in the Assessment/Plan Section.  Pertinent information has been shared with the patient in the After Visit Summary.    Diagnoses and all orders for this visit:    1. Medicare annual wellness visit, subsequent (Primary)  -     CBC w AUTO Differential; Future  -     Comprehensive metabolic panel; Future  -     Lipid panel; Future  -     Vitamin D 25 hydroxy; Future  -     Magnesium; Future    2. Generalized anxiety disorder  -     LORazepam (ATIVAN) 1 MG tablet; Take 1 tablet by mouth Every 8 (Eight) Hours As Needed for Anxiety.  Dispense: 30 tablet; Refill: 3    3. RLS (restless legs syndrome)    4. Vitamin D deficiency  -     Vitamin D 25 hydroxy; Future    Other orders  -     tiZANidine (ZANAFLEX) 4 MG tablet; Take 1 tablet by mouth 2 (Two) Times a  Day for 90 days.  Dispense: 180 tablet; Refill: 0        Follow Up:   Return if symptoms worsen or fail to improve.     An After Visit Summary and PPPS were made available to the patient.          I spent 45 minutes caring for Elyssa on this date of service. This time includes time spent by me in the following activities:preparing for the visit, reviewing tests, obtaining and/or reviewing a separately obtained history, performing a medically appropriate examination and/or evaluation , counseling and educating the patient/family/caregiver, ordering medications, tests, or procedures and documenting information in the medical record

## 2022-03-22 DIAGNOSIS — F41.1 GENERALIZED ANXIETY DISORDER: ICD-10-CM

## 2022-03-22 NOTE — TELEPHONE ENCOUNTER
Caller: SHIRLEY MUELLER    Relationship: Mother    Best call back number: [phone]     Requested Prescriptions:   Requested Prescriptions     Pending Prescriptions Disp Refills   • LORazepam (ATIVAN) 1 MG tablet 30 tablet 3     Sig: Take 1 tablet by mouth Every 8 (Eight) Hours As Needed for Anxiety.        Pharmacy where request should be sent: [facility] [place] - [address] [place] [address] - [phone] [facility] [phone] [facility]     Additional details provided by patient: OUT OF MEDICATION    Does the patient have less than a 3 day supply:  [x] Yes  [] No    Iwona Malloy   03/22/22 10:22 EDT

## 2022-03-24 RX ORDER — LORAZEPAM 1 MG/1
1 TABLET ORAL EVERY 8 HOURS PRN
Qty: 30 TABLET | Refills: 3 | Status: SHIPPED | OUTPATIENT
Start: 2022-03-24 | End: 2022-08-25 | Stop reason: SDUPTHER

## 2022-04-04 RX ORDER — TRAZODONE HYDROCHLORIDE 50 MG/1
TABLET ORAL
Qty: 30 TABLET | Refills: 4 | Status: SHIPPED | OUTPATIENT
Start: 2022-04-04 | End: 2023-01-05 | Stop reason: SDUPTHER

## 2022-04-04 RX ORDER — LAMOTRIGINE 150 MG/1
TABLET ORAL
Qty: 60 TABLET | Refills: 4 | Status: SHIPPED | OUTPATIENT
Start: 2022-04-04 | End: 2023-01-05 | Stop reason: SDUPTHER

## 2022-05-02 RX ORDER — ATORVASTATIN CALCIUM 80 MG/1
TABLET, FILM COATED ORAL
Qty: 90 TABLET | Refills: 3 | Status: SHIPPED | OUTPATIENT
Start: 2022-05-02 | End: 2023-01-05 | Stop reason: SDUPTHER

## 2022-05-31 ENCOUNTER — LAB (OUTPATIENT)
Dept: FAMILY MEDICINE CLINIC | Facility: CLINIC | Age: 37
End: 2022-05-31

## 2022-05-31 ENCOUNTER — OFFICE VISIT (OUTPATIENT)
Dept: FAMILY MEDICINE CLINIC | Facility: CLINIC | Age: 37
End: 2022-05-31

## 2022-05-31 VITALS
HEIGHT: 65 IN | HEART RATE: 83 BPM | SYSTOLIC BLOOD PRESSURE: 110 MMHG | DIASTOLIC BLOOD PRESSURE: 66 MMHG | WEIGHT: 222 LBS | BODY MASS INDEX: 36.99 KG/M2 | RESPIRATION RATE: 16 BRPM | OXYGEN SATURATION: 97 %

## 2022-05-31 DIAGNOSIS — G25.81 RLS (RESTLESS LEGS SYNDROME): Primary | ICD-10-CM

## 2022-05-31 DIAGNOSIS — E78.2 MIXED HYPERLIPIDEMIA: ICD-10-CM

## 2022-05-31 LAB
25(OH)D3 SERPL-MCNC: 25.4 NG/ML (ref 30–100)
ALBUMIN SERPL-MCNC: 4.3 G/DL (ref 3.5–5.2)
ALBUMIN/GLOB SERPL: 1.6 G/DL
ALP SERPL-CCNC: 76 U/L (ref 39–117)
ALT SERPL W P-5'-P-CCNC: 17 U/L (ref 1–33)
ANION GAP SERPL CALCULATED.3IONS-SCNC: 12.3 MMOL/L (ref 5–15)
AST SERPL-CCNC: 18 U/L (ref 1–32)
BASOPHILS # BLD AUTO: 0 10*3/MM3 (ref 0–0.2)
BASOPHILS NFR BLD AUTO: 0.5 % (ref 0–1.5)
BILIRUB SERPL-MCNC: 0.3 MG/DL (ref 0–1.2)
BUN SERPL-MCNC: 9 MG/DL (ref 6–20)
BUN/CREAT SERPL: 16.1 (ref 7–25)
CALCIUM SPEC-SCNC: 9.7 MG/DL (ref 8.6–10.5)
CHLORIDE SERPL-SCNC: 102 MMOL/L (ref 98–107)
CHOLEST SERPL-MCNC: 229 MG/DL (ref 0–200)
CO2 SERPL-SCNC: 23.7 MMOL/L (ref 22–29)
CREAT SERPL-MCNC: 0.56 MG/DL (ref 0.57–1)
DEPRECATED RDW RBC AUTO: 39.8 FL (ref 37–54)
EGFRCR SERPLBLD CKD-EPI 2021: 123 ML/MIN/1.73
EOSINOPHIL # BLD AUTO: 0.1 10*3/MM3 (ref 0–0.4)
EOSINOPHIL NFR BLD AUTO: 1 % (ref 0.3–6.2)
ERYTHROCYTE [DISTWIDTH] IN BLOOD BY AUTOMATED COUNT: 13.2 % (ref 12.3–15.4)
GLOBULIN UR ELPH-MCNC: 2.7 GM/DL
GLUCOSE SERPL-MCNC: 104 MG/DL (ref 65–99)
HCT VFR BLD AUTO: 41.2 % (ref 34–46.6)
HDLC SERPL-MCNC: 39 MG/DL (ref 40–60)
HGB BLD-MCNC: 13.5 G/DL (ref 12–15.9)
LDLC SERPL CALC-MCNC: 158 MG/DL (ref 0–100)
LDLC/HDLC SERPL: 3.98 {RATIO}
LYMPHOCYTES # BLD AUTO: 2.9 10*3/MM3 (ref 0.7–3.1)
LYMPHOCYTES NFR BLD AUTO: 36.1 % (ref 19.6–45.3)
MAGNESIUM SERPL-MCNC: 1.8 MG/DL (ref 1.6–2.6)
MCH RBC QN AUTO: 28.2 PG (ref 26.6–33)
MCHC RBC AUTO-ENTMCNC: 32.7 G/DL (ref 31.5–35.7)
MCV RBC AUTO: 86.4 FL (ref 79–97)
MONOCYTES # BLD AUTO: 0.5 10*3/MM3 (ref 0.1–0.9)
MONOCYTES NFR BLD AUTO: 5.9 % (ref 5–12)
NEUTROPHILS NFR BLD AUTO: 4.6 10*3/MM3 (ref 1.7–7)
NEUTROPHILS NFR BLD AUTO: 56.5 % (ref 42.7–76)
NRBC BLD AUTO-RTO: 0 /100 WBC (ref 0–0.2)
PLATELET # BLD AUTO: 293 10*3/MM3 (ref 140–450)
PMV BLD AUTO: 8.4 FL (ref 6–12)
POTASSIUM SERPL-SCNC: 4.3 MMOL/L (ref 3.5–5.2)
PROT SERPL-MCNC: 7 G/DL (ref 6–8.5)
RBC # BLD AUTO: 4.77 10*6/MM3 (ref 3.77–5.28)
SODIUM SERPL-SCNC: 138 MMOL/L (ref 136–145)
TRIGL SERPL-MCNC: 173 MG/DL (ref 0–150)
VLDLC SERPL-MCNC: 32 MG/DL (ref 5–40)
WBC NRBC COR # BLD: 8.1 10*3/MM3 (ref 3.4–10.8)

## 2022-05-31 PROCEDURE — 80061 LIPID PANEL: CPT | Performed by: PHYSICIAN ASSISTANT

## 2022-05-31 PROCEDURE — 99214 OFFICE O/P EST MOD 30 MIN: CPT | Performed by: PHYSICIAN ASSISTANT

## 2022-05-31 PROCEDURE — 36415 COLL VENOUS BLD VENIPUNCTURE: CPT

## 2022-05-31 PROCEDURE — 85025 COMPLETE CBC W/AUTO DIFF WBC: CPT | Performed by: PHYSICIAN ASSISTANT

## 2022-05-31 PROCEDURE — 82306 VITAMIN D 25 HYDROXY: CPT | Performed by: PHYSICIAN ASSISTANT

## 2022-05-31 PROCEDURE — 80053 COMPREHEN METABOLIC PANEL: CPT | Performed by: PHYSICIAN ASSISTANT

## 2022-05-31 PROCEDURE — 83735 ASSAY OF MAGNESIUM: CPT | Performed by: PHYSICIAN ASSISTANT

## 2022-05-31 RX ORDER — MAGNESIUM OXIDE 400 MG/1
400 TABLET ORAL DAILY
Qty: 90 TABLET | Refills: 0 | Status: SHIPPED | OUTPATIENT
Start: 2022-05-31 | End: 2022-06-10

## 2022-05-31 RX ORDER — SERTRALINE HYDROCHLORIDE 100 MG/1
TABLET, FILM COATED ORAL
Qty: 90 TABLET | Refills: 0 | Status: SHIPPED | OUTPATIENT
Start: 2022-05-31 | End: 2023-01-05 | Stop reason: SDUPTHER

## 2022-05-31 RX ORDER — CYCLOBENZAPRINE HCL 10 MG
10 TABLET ORAL NIGHTLY
Qty: 30 TABLET | Refills: 0 | Status: SHIPPED | OUTPATIENT
Start: 2022-05-31 | End: 2022-10-20 | Stop reason: SDUPTHER

## 2022-05-31 RX ORDER — IBUPROFEN 800 MG/1
TABLET ORAL
Qty: 270 TABLET | Refills: 0 | Status: SHIPPED | OUTPATIENT
Start: 2022-05-31 | End: 2023-01-05 | Stop reason: SDUPTHER

## 2022-05-31 NOTE — PROGRESS NOTES
"Subjective   Elyssa Olivarez is a 36 y.o. female.     Chief Complaint   Patient presents with   • Hyperlipidemia       /66   Pulse 83   Resp 16   Ht 165.1 cm (65\")   Wt 101 kg (222 lb)   SpO2 97%   BMI 36.94 kg/m²     BP Readings from Last 3 Encounters:   05/31/22 110/66   02/21/22 119/70   12/22/21 114/72       Wt Readings from Last 3 Encounters:   05/31/22 101 kg (222 lb)   02/21/22 102 kg (223 lb 12.8 oz)   12/22/21 102 kg (225 lb 3.2 oz)       HPI Presents to the clinic for management of hld, gerd, mdd, simi. She has been doing well and has lost 1 pound. She was working at [employer] and had some issues and had to quit. She did have a fall and hurt her right hip. She feels like the hip pain is improving. She has pain in her legs. This is chronic and was previously on tizanidine which stopped working. She is not taking this now.     The following portions of the patient's history were reviewed and updated as appropriate: allergies, current medications, past family history, past medical history, past social history, past surgical history and problem list.    Review of Systems    Objective   Physical Exam  Vitals and nursing note reviewed.   Constitutional:       General: She is not in acute distress.     Appearance: Normal appearance. She is well-developed. She is obese.      Comments: Obese female who is pleasant in no acute distress   HENT:      Head: Normocephalic and atraumatic.      Nose: Nose normal.      Mouth/Throat:      Mouth: Mucous membranes are moist.      Pharynx: Oropharynx is clear.   Eyes:      General: Lids are normal.      Extraocular Movements: Extraocular movements intact.      Conjunctiva/sclera: Conjunctivae normal.      Pupils: Pupils are equal, round, and reactive to light.   Neck:      Thyroid: No thyroid mass or thyromegaly.      Vascular: No carotid bruit or JVD.      Trachea: Trachea normal.      Comments: No carotid bruits  Cardiovascular:      Rate and Rhythm: " Normal rate and regular rhythm.      Pulses: Normal pulses.      Heart sounds: Normal heart sounds. No murmur heard.  Pulmonary:      Effort: Pulmonary effort is normal.      Breath sounds: Normal breath sounds.   Abdominal:      General: Abdomen is flat. Bowel sounds are normal. There is no distension.      Palpations: Abdomen is soft.      Tenderness: There is no abdominal tenderness.   Musculoskeletal:         General: No deformity. Normal range of motion.      Cervical back: Normal range of motion and neck supple.      Comments: No c/c/e   Lymphadenopathy:      Cervical: No cervical adenopathy.   Skin:     General: Skin is warm and dry.      Findings: No rash.   Neurological:      General: No focal deficit present.      Mental Status: She is alert and oriented to person, place, and time.      Cranial Nerves: No cranial nerve deficit.   Psychiatric:         Attention and Perception: She is attentive.         Mood and Affect: Mood normal.         Speech: Speech normal.         Behavior: Behavior normal.         Thought Content: Thought content normal.         Judgment: Judgment normal.           Diagnoses and all orders for this visit:    1. RLS (restless legs syndrome) (Primary)    2. Mixed hyperlipidemia    Other orders  -     cyclobenzaprine (FLEXERIL) 10 MG tablet; Take 1 tablet by mouth Every Night.  Dispense: 30 tablet; Refill: 0  -     magnesium oxide (MAG-OX) 400 MG tablet; Take 1 tablet by mouth Daily for 90 days.  Dispense: 90 tablet; Refill: 0    continue current medications. Can try magnesium and flexeril for her rls. We can change to a more suited rls medication in the future if this does not help.     Return if symptoms worsen or fail to improve.

## 2022-06-02 RX ORDER — ERGOCALCIFEROL 1.25 MG/1
50000 CAPSULE ORAL WEEKLY
Qty: 12 CAPSULE | Refills: 0 | Status: SHIPPED | OUTPATIENT
Start: 2022-06-02 | End: 2022-08-31

## 2022-07-05 ENCOUNTER — LAB (OUTPATIENT)
Dept: FAMILY MEDICINE CLINIC | Facility: CLINIC | Age: 37
End: 2022-07-05

## 2022-07-05 ENCOUNTER — OFFICE VISIT (OUTPATIENT)
Dept: FAMILY MEDICINE CLINIC | Facility: CLINIC | Age: 37
End: 2022-07-05

## 2022-07-05 VITALS
OXYGEN SATURATION: 97 % | BODY MASS INDEX: 37.15 KG/M2 | TEMPERATURE: 97.3 F | HEART RATE: 97 BPM | DIASTOLIC BLOOD PRESSURE: 70 MMHG | HEIGHT: 65 IN | SYSTOLIC BLOOD PRESSURE: 120 MMHG | RESPIRATION RATE: 16 BRPM | WEIGHT: 223 LBS

## 2022-07-05 DIAGNOSIS — L02.416 ABSCESS OF LEFT LEG: ICD-10-CM

## 2022-07-05 DIAGNOSIS — L02.416 ABSCESS OF LEFT LEG: Primary | ICD-10-CM

## 2022-07-05 PROCEDURE — 99214 OFFICE O/P EST MOD 30 MIN: CPT | Performed by: INTERNAL MEDICINE

## 2022-07-05 PROCEDURE — 87186 SC STD MICRODIL/AGAR DIL: CPT | Performed by: INTERNAL MEDICINE

## 2022-07-05 PROCEDURE — 87070 CULTURE OTHR SPECIMN AEROBIC: CPT | Performed by: INTERNAL MEDICINE

## 2022-07-05 PROCEDURE — 87205 SMEAR GRAM STAIN: CPT | Performed by: INTERNAL MEDICINE

## 2022-07-05 PROCEDURE — 87147 CULTURE TYPE IMMUNOLOGIC: CPT | Performed by: INTERNAL MEDICINE

## 2022-07-05 RX ORDER — TRAMADOL HYDROCHLORIDE 50 MG/1
50 TABLET ORAL EVERY 8 HOURS PRN
Qty: 15 TABLET | Refills: 0 | Status: SHIPPED | OUTPATIENT
Start: 2022-07-05 | End: 2023-03-22 | Stop reason: SDUPTHER

## 2022-07-05 RX ORDER — CLINDAMYCIN HYDROCHLORIDE 300 MG/1
300 CAPSULE ORAL 3 TIMES DAILY
Qty: 40 CAPSULE | Refills: 0 | Status: SHIPPED | OUTPATIENT
Start: 2022-07-05 | End: 2022-07-12

## 2022-07-05 NOTE — PROGRESS NOTES
"Rooming Tab(CC,VS,Pt Hx,Fall Screen)  Chief Complaint   Patient presents with   • Insect Bite     Left leg       Subjective      Left Leg Wound  Patient noted 2 days ago a wound to her left lower leg. She does not of any trauma or bite to the area. She has been picking at it. She has had some pain with ambulation of the area. No fevers. Mom has applied antibacterial cream to the area without relief of the symptoms. She has noticed some drainage from the site. No streaking of the wound. Has been warm and tender to the touch.     I have reviewed and updated her medications, medical history and problem list during today's office visit.     Patient Care Team:  Vimal Moseley PA-C as PCP - General (Physician Assistant)    Problem List Tab  Medications Tab  Synopsis Tab  Chart Review Tab  Care Everywhere Tab  Immunizations Tab  Patient History Tab    Social History     Tobacco Use   • Smoking status: Never Smoker   • Smokeless tobacco: Never Used   Substance Use Topics   • Alcohol use: No       Review of Systems   Skin: Positive for wound. Negative for dry skin, pallor and rash.       Objective     Rooming Tab(CC,VS,Pt Hx,Fall Screen)  /70 (BP Location: Right arm, Patient Position: Sitting, Cuff Size: Adult)   Pulse 97   Temp 97.3 °F (36.3 °C) (Infrared)   Resp 16   Ht 165.1 cm (65\")   Wt 101 kg (223 lb)   SpO2 97%   BMI 37.11 kg/m²     Body mass index is 37.11 kg/m².    Physical Exam  Constitutional:       Appearance: Normal appearance.   Cardiovascular:      Rate and Rhythm: Normal rate and regular rhythm.      Pulses: Normal pulses.   Pulmonary:      Effort: Pulmonary effort is normal.   Skin:     Capillary Refill: Capillary refill takes less than 2 seconds.      Comments: 10 cm circular area to the left exterior cath that is warm, red, with centered. No deep pocked. Some areas of induration. No streaking.    Neurological:      Mental Status: She is alert.          Statin Choice Calculator  Data " Reviewed:    XR Wrist 3+ View Right    Result Date: 6/10/2022  Impression: Normal right wrist series. No significant change from 9/7/2016.  Electronically Signed By-Nickie Francois MD On:6/10/2022 3:06 PM This report was finalized on [id number] by  Nickie Francois MD.      The data below has been reviewed by Liane Billingsley MD on 07/05/2022.      Lab Results   Component Value Date    BUN 9 05/31/2022    CREATININE 0.56 (L) 05/31/2022     05/31/2022    K 4.3 05/31/2022     05/31/2022    CALCIUM 9.7 05/31/2022    ALBUMIN 4.30 05/31/2022    BILITOT 0.3 05/31/2022    ALKPHOS 76 05/31/2022    AST 18 05/31/2022    ALT 17 05/31/2022    TRIG 173 (H) 05/31/2022    HDL 39 (L) 05/31/2022    VLDL 32 05/31/2022     (H) 05/31/2022    LDLHDL 3.98 05/31/2022    WBC 8.10 05/31/2022    RBC 4.77 05/31/2022    HCT 41.2 05/31/2022    MCV 86.4 05/31/2022    MCH 28.2 05/31/2022    XRQH97NI 25.4 (L) 05/31/2022      Assessment & Plan   Order Review Tab  Health Maintenance Tab  Patient Plan/Order Tab  Diagnoses and all orders for this visit:    1. Abscess of left leg (Primary)  -     Wound Culture - Wound, Leg, Left; Future  -     traMADol (ULTRAM) 50 MG tablet; Take 1 tablet by mouth Every 8 (Eight) Hours As Needed for Moderate Pain .  Dispense: 15 tablet; Refill: 0    Other orders  -     clindamycin (CLEOCIN) 300 MG capsule; Take 1 capsule by mouth 3 (Three) Times a Day for 7 days.  Dispense: 40 capsule; Refill: 0    shannon tart with clindamycin- check culture and recheck in 3 days- mom understands plan    I have seen and examined Elyssa Olivarez with resident and agree with findings and assessment and plan    This document has been electronically signed by Liane Billingsley MD on July 17, 2022 22:54 EDT        Wrapup Tab  Return in about 3 days (around 7/8/2022) for Recheck of wound culture.       They were informed of the diagnosis and treatment plan and directed to f/u for any further problems or  concerns.      I have seen and examined Elyssa Olivarez with resident and agree with findings and assessment and plan    This document has been electronically signed by Liane Billingsley MD on July 5, 2022 16:52 EDT

## 2022-07-07 NOTE — PROGRESS NOTES
"Left vm with results. Seen she has an appt with dr. Billingsley tomorrow.      FOR HUB TO SAY    \"MRSA- Clinda would cover that was prescribed to her. Need to ensure this resolves. If it does not seem to be improving we will need to reevaluate. \""

## 2022-07-08 ENCOUNTER — OFFICE VISIT (OUTPATIENT)
Dept: FAMILY MEDICINE CLINIC | Facility: CLINIC | Age: 37
End: 2022-07-08

## 2022-07-08 VITALS
DIASTOLIC BLOOD PRESSURE: 76 MMHG | WEIGHT: 222 LBS | BODY MASS INDEX: 36.99 KG/M2 | HEIGHT: 65 IN | OXYGEN SATURATION: 97 % | TEMPERATURE: 97.1 F | RESPIRATION RATE: 16 BRPM | SYSTOLIC BLOOD PRESSURE: 110 MMHG | HEART RATE: 81 BPM

## 2022-07-08 DIAGNOSIS — L02.93 CARBUNCLE: Primary | ICD-10-CM

## 2022-07-08 LAB
BACTERIA SPEC AEROBE CULT: ABNORMAL
GRAM STN SPEC: ABNORMAL
GRAM STN SPEC: ABNORMAL

## 2022-07-08 PROCEDURE — 99213 OFFICE O/P EST LOW 20 MIN: CPT | Performed by: INTERNAL MEDICINE

## 2022-07-08 RX ORDER — MUPIROCIN CALCIUM 20 MG/G
1 CREAM TOPICAL 2 TIMES DAILY
Qty: 15 G | Refills: 1 | Status: SHIPPED | OUTPATIENT
Start: 2022-07-08 | End: 2022-12-30

## 2022-08-25 DIAGNOSIS — F41.1 GENERALIZED ANXIETY DISORDER: ICD-10-CM

## 2022-08-26 RX ORDER — LORAZEPAM 1 MG/1
1 TABLET ORAL EVERY 8 HOURS PRN
Qty: 30 TABLET | Refills: 3 | Status: SHIPPED | OUTPATIENT
Start: 2022-08-26 | End: 2022-09-26 | Stop reason: SDUPTHER

## 2022-09-15 ENCOUNTER — OFFICE VISIT (OUTPATIENT)
Dept: FAMILY MEDICINE CLINIC | Facility: CLINIC | Age: 37
End: 2022-09-15

## 2022-09-15 VITALS
SYSTOLIC BLOOD PRESSURE: 124 MMHG | HEART RATE: 72 BPM | WEIGHT: 217 LBS | OXYGEN SATURATION: 97 % | DIASTOLIC BLOOD PRESSURE: 80 MMHG | RESPIRATION RATE: 16 BRPM | BODY MASS INDEX: 36.11 KG/M2

## 2022-09-15 DIAGNOSIS — F41.1 GENERALIZED ANXIETY DISORDER: ICD-10-CM

## 2022-09-15 DIAGNOSIS — E78.2 MIXED HYPERLIPIDEMIA: Primary | ICD-10-CM

## 2022-09-15 DIAGNOSIS — L02.93 CARBUNCLE: ICD-10-CM

## 2022-09-15 PROCEDURE — 99214 OFFICE O/P EST MOD 30 MIN: CPT | Performed by: PHYSICIAN ASSISTANT

## 2022-09-15 NOTE — PROGRESS NOTES
Subjective   Elyssa Olivarez is a 36 y.o. female.     Chief Complaint   Patient presents with   • Follow-up       /80 (BP Location: Right arm, Patient Position: Sitting)   Pulse 72   Resp 16   Wt 98.4 kg (217 lb)   SpO2 97%   BMI 36.11 kg/m²     BP Readings from Last 3 Encounters:   09/15/22 124/80   07/08/22 110/76   07/05/22 120/70       Wt Readings from Last 3 Encounters:   09/15/22 98.4 kg (217 lb)   07/08/22 101 kg (222 lb)   07/05/22 101 kg (223 lb)       HPI Presents to the clinic for follow up of obesity, hld, mdd, insomnia. She has been doing well. She has lost weight over the past 2 months. She has lost 5 pounds. No chest pain, soa, headaches. No joint pains or muscle aches that are new. Trying to get back to working again but had misunderstanding with work place.     The following portions of the patient's history were reviewed and updated as appropriate: allergies, current medications, past family history, past medical history, past social history, past surgical history and problem list.    Review of Systems    Objective   Physical Exam  Vitals and nursing note reviewed.   Constitutional:       General: She is not in acute distress.     Appearance: Normal appearance. She is well-developed.      Comments: Obese female who is pleasant in no acute distress   HENT:      Head: Normocephalic and atraumatic.      Nose: Nose normal.      Mouth/Throat:      Mouth: Mucous membranes are moist.      Pharynx: Oropharynx is clear.   Eyes:      General: Lids are normal.      Extraocular Movements: Extraocular movements intact.      Conjunctiva/sclera: Conjunctivae normal.      Pupils: Pupils are equal, round, and reactive to light.   Neck:      Thyroid: No thyroid mass or thyromegaly.      Vascular: No carotid bruit or JVD.      Trachea: Trachea normal.      Comments: No carotid bruits  Cardiovascular:      Rate and Rhythm: Normal rate and regular rhythm.      Pulses: Normal pulses.      Heart sounds:  Normal heart sounds. No murmur heard.  Pulmonary:      Effort: Pulmonary effort is normal.      Breath sounds: Normal breath sounds.   Abdominal:      General: Abdomen is flat. Bowel sounds are normal. There is no distension.      Palpations: Abdomen is soft.      Tenderness: There is no abdominal tenderness.   Musculoskeletal:         General: No deformity. Normal range of motion.      Cervical back: Normal range of motion and neck supple.      Comments: No c/c/e   Lymphadenopathy:      Cervical: No cervical adenopathy.   Skin:     General: Skin is warm and dry.      Findings: No rash.   Neurological:      General: No focal deficit present.      Mental Status: She is alert and oriented to person, place, and time.      Cranial Nerves: No cranial nerve deficit.   Psychiatric:         Attention and Perception: She is attentive.         Mood and Affect: Mood normal.         Speech: Speech normal.         Behavior: Behavior normal.         Thought Content: Thought content normal.         Judgment: Judgment normal.           Diagnoses and all orders for this visit:    1. Mixed hyperlipidemia (Primary)    2. Generalized anxiety disorder    3. Carbuncle  Comments:  resolved. history of MRSA now documented.       Continue with weight loss goals as discussed. Doing well.     No follow-ups on file.

## 2022-09-26 DIAGNOSIS — F41.1 GENERALIZED ANXIETY DISORDER: ICD-10-CM

## 2022-09-27 RX ORDER — LORAZEPAM 1 MG/1
1 TABLET ORAL EVERY 8 HOURS PRN
Qty: 30 TABLET | Refills: 3 | Status: SHIPPED | OUTPATIENT
Start: 2022-09-27 | End: 2023-01-05 | Stop reason: SDUPTHER

## 2022-10-05 RX ORDER — DOXYCYCLINE HYCLATE 100 MG/1
100 CAPSULE ORAL 2 TIMES DAILY
Qty: 20 CAPSULE | Refills: 0 | Status: SHIPPED | OUTPATIENT
Start: 2022-10-05 | End: 2023-01-05

## 2022-10-07 ENCOUNTER — OFFICE VISIT (OUTPATIENT)
Dept: FAMILY MEDICINE CLINIC | Facility: CLINIC | Age: 37
End: 2022-10-07

## 2022-10-07 ENCOUNTER — HOSPITAL ENCOUNTER (EMERGENCY)
Facility: HOSPITAL | Age: 37
Discharge: HOME | End: 2022-10-07
Attending: EMERGENCY MEDICINE

## 2022-10-07 ENCOUNTER — TELEPHONE (OUTPATIENT)
Dept: FAMILY MEDICINE CLINIC | Facility: CLINIC | Age: 37
End: 2022-10-07

## 2022-10-07 VITALS
DIASTOLIC BLOOD PRESSURE: 82 MMHG | SYSTOLIC BLOOD PRESSURE: 134 MMHG | HEART RATE: 106 BPM | OXYGEN SATURATION: 95 % | HEIGHT: 65 IN | RESPIRATION RATE: 16 BRPM | TEMPERATURE: 97.1 F | WEIGHT: 222.8 LBS | BODY MASS INDEX: 37.12 KG/M2

## 2022-10-07 DIAGNOSIS — L02.31 ABSCESS OF LEFT BUTTOCK: Primary | ICD-10-CM

## 2022-10-07 PROCEDURE — 99211 OFF/OP EST MAY X REQ PHY/QHP: CPT | Performed by: EMERGENCY MEDICINE

## 2022-10-07 PROCEDURE — 99213 OFFICE O/P EST LOW 20 MIN: CPT | Performed by: PHYSICIAN ASSISTANT

## 2022-10-07 NOTE — PROGRESS NOTES
"Subjective   Elyssa Olivarez is a 36 y.o. female.     Chief Complaint   Patient presents with   • boil on her bottom     Patient has a boil that came up on her left butt check 1 week ago. She took 1 round of antibiotics doxycycline 100 mg 1 bid for 10 days       /82   Pulse 106   Temp 97.1 °F (36.2 °C) (Infrared)   Resp 16   Ht 165.1 cm (65\")   Wt 101 kg (222 lb 12.8 oz)   SpO2 95%   BMI 37.08 kg/m²     BP Readings from Last 3 Encounters:   10/07/22 134/82   09/15/22 124/80   07/08/22 110/76       Wt Readings from Last 3 Encounters:   10/07/22 101 kg (222 lb 12.8 oz)   09/15/22 98.4 kg (217 lb)   07/08/22 101 kg (222 lb)       HPI Presents to the clinic for abscess on the left buttock. This has been present for the past few days. Has a history of MRSA. She is in significant pain and is vomiting. She had low grade temp yesterday. She is unable to keep down oral abx at this time.     The following portions of the patient's history were reviewed and updated as appropriate: allergies, current medications, past family history, past medical history, past social history, past surgical history and problem list.    Review of Systems    Objective   Physical Exam  Constitutional:       Appearance: Normal appearance.   Eyes:      Extraocular Movements: Extraocular movements intact.      Pupils: Pupils are equal, round, and reactive to light.   Skin:     Comments: Indurated and erythematous area over the left buttock that does have some mild drainage and fluctuance.    Neurological:      General: No focal deficit present.      Mental Status: She is alert and oriented to person, place, and time.   Psychiatric:         Mood and Affect: Mood normal.         Behavior: Behavior normal.           Diagnoses and all orders for this visit:    1. Abscess of left buttock (Primary)  Comments:  due to the inability to keep down oral abx and difficulty in draining this in clinic with autism- discussed with mom she will need " to go to hosptial.         Return if symptoms worsen or fail to improve.

## 2022-10-07 NOTE — TELEPHONE ENCOUNTER
Caller: SHIRLEY MUELLER    Relationship to patient: Mother    Best call back number: [phone]    Patient is needing: THE PATIENT'S MOTHER STATES THAT THE PATIENT IS NOW BEING SEEN AT [facility] INSTEAD.

## 2022-10-21 RX ORDER — CYCLOBENZAPRINE HCL 10 MG
10 TABLET ORAL NIGHTLY
Qty: 30 TABLET | Refills: 0 | Status: SHIPPED | OUTPATIENT
Start: 2022-10-21 | End: 2022-11-24 | Stop reason: SDUPTHER

## 2022-11-27 RX ORDER — CYCLOBENZAPRINE HCL 10 MG
10 TABLET ORAL NIGHTLY
Qty: 30 TABLET | Refills: 0 | Status: SHIPPED | OUTPATIENT
Start: 2022-11-27 | End: 2023-01-05

## 2022-12-09 RX ORDER — EZETIMIBE 10 MG/1
10 TABLET ORAL DAILY
Qty: 90 TABLET | Refills: 3 | Status: SHIPPED | OUTPATIENT
Start: 2022-12-09 | End: 2023-01-05 | Stop reason: SDUPTHER

## 2022-12-15 ENCOUNTER — LAB (OUTPATIENT)
Dept: FAMILY MEDICINE CLINIC | Facility: CLINIC | Age: 37
End: 2022-12-15

## 2022-12-15 ENCOUNTER — OFFICE VISIT (OUTPATIENT)
Dept: FAMILY MEDICINE CLINIC | Facility: CLINIC | Age: 37
End: 2022-12-15

## 2022-12-15 VITALS
BODY MASS INDEX: 35.72 KG/M2 | DIASTOLIC BLOOD PRESSURE: 80 MMHG | RESPIRATION RATE: 16 BRPM | HEART RATE: 70 BPM | HEIGHT: 65 IN | WEIGHT: 214.4 LBS | OXYGEN SATURATION: 97 % | SYSTOLIC BLOOD PRESSURE: 140 MMHG

## 2022-12-15 DIAGNOSIS — E78.2 MIXED HYPERLIPIDEMIA: Primary | ICD-10-CM

## 2022-12-15 DIAGNOSIS — E66.01 CLASS 2 SEVERE OBESITY WITH SERIOUS COMORBIDITY AND BODY MASS INDEX (BMI) OF 35.0 TO 35.9 IN ADULT, UNSPECIFIED OBESITY TYPE: ICD-10-CM

## 2022-12-15 DIAGNOSIS — F41.1 GENERALIZED ANXIETY DISORDER: ICD-10-CM

## 2022-12-15 DIAGNOSIS — E78.2 MIXED HYPERLIPIDEMIA: ICD-10-CM

## 2022-12-15 LAB
ALBUMIN SERPL-MCNC: 4.7 G/DL (ref 3.5–5.2)
ALBUMIN/GLOB SERPL: 1.4 G/DL
ALP SERPL-CCNC: 101 U/L (ref 39–117)
ALT SERPL W P-5'-P-CCNC: 27 U/L (ref 1–33)
ANION GAP SERPL CALCULATED.3IONS-SCNC: 8.6 MMOL/L (ref 5–15)
AST SERPL-CCNC: 23 U/L (ref 1–32)
BILIRUB SERPL-MCNC: 0.3 MG/DL (ref 0–1.2)
BUN SERPL-MCNC: 8 MG/DL (ref 6–20)
BUN/CREAT SERPL: 11.1 (ref 7–25)
CALCIUM SPEC-SCNC: 9.8 MG/DL (ref 8.6–10.5)
CHLORIDE SERPL-SCNC: 100 MMOL/L (ref 98–107)
CHOLEST SERPL-MCNC: 245 MG/DL (ref 0–200)
CO2 SERPL-SCNC: 28.4 MMOL/L (ref 22–29)
CREAT SERPL-MCNC: 0.72 MG/DL (ref 0.57–1)
EGFRCR SERPLBLD CKD-EPI 2021: 112.7 ML/MIN/1.73
GLOBULIN UR ELPH-MCNC: 3.4 GM/DL
GLUCOSE SERPL-MCNC: 94 MG/DL (ref 65–99)
HDLC SERPL-MCNC: 43 MG/DL (ref 40–60)
LDLC SERPL CALC-MCNC: 182 MG/DL (ref 0–100)
LDLC/HDLC SERPL: 4.18 {RATIO}
POTASSIUM SERPL-SCNC: 4 MMOL/L (ref 3.5–5.2)
PROT SERPL-MCNC: 8.1 G/DL (ref 6–8.5)
SODIUM SERPL-SCNC: 137 MMOL/L (ref 136–145)
TRIGL SERPL-MCNC: 112 MG/DL (ref 0–150)
VLDLC SERPL-MCNC: 20 MG/DL (ref 5–40)

## 2022-12-15 PROCEDURE — 80053 COMPREHEN METABOLIC PANEL: CPT | Performed by: PHYSICIAN ASSISTANT

## 2022-12-15 PROCEDURE — 99214 OFFICE O/P EST MOD 30 MIN: CPT | Performed by: PHYSICIAN ASSISTANT

## 2022-12-15 PROCEDURE — 80061 LIPID PANEL: CPT | Performed by: PHYSICIAN ASSISTANT

## 2022-12-15 PROCEDURE — 36415 COLL VENOUS BLD VENIPUNCTURE: CPT

## 2022-12-15 NOTE — PROGRESS NOTES
"Subjective   Elyssa Olivarez is a 36 y.o. female.     Chief Complaint   Patient presents with   • Follow-up       /80   Pulse 70   Resp 16   Ht 165.1 cm (65\")   Wt 97.3 kg (214 lb 6.4 oz)   SpO2 97%   BMI 35.68 kg/m²     BP Readings from Last 3 Encounters:   12/15/22 140/80   10/07/22 134/82   09/15/22 124/80       Wt Readings from Last 3 Encounters:   12/15/22 97.3 kg (214 lb 6.4 oz)   10/07/22 101 kg (222 lb 12.8 oz)   09/15/22 98.4 kg (217 lb)       HPI Presents to the clinic for follow up on hld, mdd, anxiety, gerd. She has been working on weight loss and has been doing well. She is taking lipitor 40mg daily. She denies any chest pain, soa, headaches, abdominal issues. She is seeing a dentist now for her teeth. No other abscesses.     The following portions of the patient's history were reviewed and updated as appropriate: allergies, current medications, past family history, past medical history, past social history, past surgical history and problem list.    Review of Systems    Objective   Physical Exam  Constitutional:       Appearance: Normal appearance. She is obese.   HENT:      Right Ear: Tympanic membrane normal.      Left Ear: Tympanic membrane normal.      Nose: No congestion.      Mouth/Throat:      Pharynx: No posterior oropharyngeal erythema.   Eyes:      Extraocular Movements: Extraocular movements intact.      Pupils: Pupils are equal, round, and reactive to light.   Cardiovascular:      Rate and Rhythm: Normal rate.      Heart sounds: No murmur heard.  Pulmonary:      Effort: Pulmonary effort is normal.      Breath sounds: No wheezing.   Abdominal:      General: There is no distension.   Neurological:      General: No focal deficit present.      Mental Status: She is alert and oriented to person, place, and time.   Psychiatric:         Mood and Affect: Mood normal.         Behavior: Behavior normal.           Diagnoses and all orders for this visit:    1. Mixed hyperlipidemia " (Primary)  -     Lipid panel; Future  -     Comprehensive metabolic panel; Future    2. Generalized anxiety disorder    3. Class 2 severe obesity with serious comorbidity and body mass index (BMI) of 35.0 to 35.9 in adult, unspecified obesity type (HCC)    Continue weight loss and current medication.     Return in about 6 months (around 6/15/2023), or if symptoms worsen or fail to improve, for Recheck.

## 2023-01-05 DIAGNOSIS — F41.1 GENERALIZED ANXIETY DISORDER: ICD-10-CM

## 2023-01-05 RX ORDER — DOXYCYCLINE HYCLATE 100 MG/1
CAPSULE ORAL
Qty: 20 CAPSULE | Refills: 0 | Status: SHIPPED | OUTPATIENT
Start: 2023-01-05

## 2023-01-05 RX ORDER — CYCLOBENZAPRINE HCL 10 MG
TABLET ORAL
Qty: 30 TABLET | Refills: 0 | Status: SHIPPED | OUTPATIENT
Start: 2023-01-05 | End: 2023-01-05 | Stop reason: SDUPTHER

## 2023-01-06 RX ORDER — IBUPROFEN 800 MG/1
800 TABLET ORAL 3 TIMES DAILY
Qty: 270 TABLET | Refills: 0 | Status: SHIPPED | OUTPATIENT
Start: 2023-01-06 | End: 2023-02-15 | Stop reason: SDUPTHER

## 2023-01-06 RX ORDER — ATORVASTATIN CALCIUM 80 MG/1
80 TABLET, FILM COATED ORAL DAILY
Qty: 90 TABLET | Refills: 3 | Status: SHIPPED | OUTPATIENT
Start: 2023-01-06 | End: 2023-02-15 | Stop reason: SDUPTHER

## 2023-01-06 RX ORDER — CYCLOBENZAPRINE HCL 10 MG
10 TABLET ORAL NIGHTLY
Qty: 30 TABLET | Refills: 0 | Status: SHIPPED | OUTPATIENT
Start: 2023-01-06 | End: 2023-02-15 | Stop reason: SDUPTHER

## 2023-01-06 RX ORDER — EZETIMIBE 10 MG/1
10 TABLET ORAL DAILY
Qty: 90 TABLET | Refills: 3 | Status: SHIPPED | OUTPATIENT
Start: 2023-01-06 | End: 2023-02-15 | Stop reason: SDUPTHER

## 2023-01-06 RX ORDER — CLINDAMYCIN AND BENZOYL PEROXIDE 10; 50 MG/G; MG/G
GEL TOPICAL 2 TIMES DAILY
Qty: 50 G | Refills: 5 | Status: SHIPPED | OUTPATIENT
Start: 2023-01-06 | End: 2023-02-15 | Stop reason: SDUPTHER

## 2023-01-06 RX ORDER — SERTRALINE HYDROCHLORIDE 100 MG/1
100 TABLET, FILM COATED ORAL DAILY
Qty: 90 TABLET | Refills: 0 | Status: SHIPPED | OUTPATIENT
Start: 2023-01-06 | End: 2023-02-15 | Stop reason: SDUPTHER

## 2023-01-06 RX ORDER — LAMOTRIGINE 150 MG/1
150 TABLET ORAL 2 TIMES DAILY
Qty: 60 TABLET | Refills: 4 | Status: SHIPPED | OUTPATIENT
Start: 2023-01-06 | End: 2023-02-15 | Stop reason: SDUPTHER

## 2023-01-06 RX ORDER — LORAZEPAM 1 MG/1
1 TABLET ORAL EVERY 8 HOURS PRN
Qty: 30 TABLET | Refills: 3 | Status: SHIPPED | OUTPATIENT
Start: 2023-01-06 | End: 2023-02-15 | Stop reason: SDUPTHER

## 2023-01-06 RX ORDER — TRAZODONE HYDROCHLORIDE 50 MG/1
50 TABLET ORAL NIGHTLY
Qty: 30 TABLET | Refills: 4 | Status: SHIPPED | OUTPATIENT
Start: 2023-01-06 | End: 2023-02-15 | Stop reason: SDUPTHER

## 2023-02-15 DIAGNOSIS — F41.1 GENERALIZED ANXIETY DISORDER: ICD-10-CM

## 2023-02-15 RX ORDER — CYCLOBENZAPRINE HCL 10 MG
10 TABLET ORAL NIGHTLY
Qty: 30 TABLET | Refills: 0 | Status: SHIPPED | OUTPATIENT
Start: 2023-02-15 | End: 2023-03-22 | Stop reason: SDUPTHER

## 2023-02-15 RX ORDER — LAMOTRIGINE 150 MG/1
150 TABLET ORAL 2 TIMES DAILY
Qty: 60 TABLET | Refills: 0 | Status: SHIPPED | OUTPATIENT
Start: 2023-02-15 | End: 2023-03-22 | Stop reason: SDUPTHER

## 2023-02-15 RX ORDER — EZETIMIBE 10 MG/1
10 TABLET ORAL DAILY
Qty: 90 TABLET | Refills: 0 | Status: SHIPPED | OUTPATIENT
Start: 2023-02-15 | End: 2023-03-22 | Stop reason: SDUPTHER

## 2023-02-15 RX ORDER — ATORVASTATIN CALCIUM 80 MG/1
80 TABLET, FILM COATED ORAL DAILY
Qty: 90 TABLET | Refills: 0 | Status: SHIPPED | OUTPATIENT
Start: 2023-02-15 | End: 2023-03-22 | Stop reason: SDUPTHER

## 2023-02-15 RX ORDER — CLINDAMYCIN AND BENZOYL PEROXIDE 10; 50 MG/G; MG/G
GEL TOPICAL 2 TIMES DAILY
Qty: 50 G | Refills: 5 | Status: SHIPPED | OUTPATIENT
Start: 2023-02-15 | End: 2023-03-22 | Stop reason: SDUPTHER

## 2023-02-15 RX ORDER — SERTRALINE HYDROCHLORIDE 100 MG/1
100 TABLET, FILM COATED ORAL DAILY
Qty: 90 TABLET | Refills: 0 | Status: SHIPPED | OUTPATIENT
Start: 2023-02-15 | End: 2023-03-22 | Stop reason: SDUPTHER

## 2023-02-16 RX ORDER — PANTOPRAZOLE SODIUM 40 MG/1
40 TABLET, DELAYED RELEASE ORAL DAILY
Qty: 90 TABLET | Refills: 2 | Status: SHIPPED | OUTPATIENT
Start: 2023-02-16 | End: 2023-03-22 | Stop reason: SDUPTHER

## 2023-02-16 RX ORDER — LORAZEPAM 1 MG/1
1 TABLET ORAL EVERY 8 HOURS PRN
Qty: 30 TABLET | Refills: 3 | Status: SHIPPED | OUTPATIENT
Start: 2023-02-16 | End: 2023-03-22 | Stop reason: SDUPTHER

## 2023-02-16 RX ORDER — TRAZODONE HYDROCHLORIDE 50 MG/1
50 TABLET ORAL NIGHTLY
Qty: 90 TABLET | Refills: 0 | Status: SHIPPED | OUTPATIENT
Start: 2023-02-16 | End: 2023-03-22 | Stop reason: SDUPTHER

## 2023-02-16 RX ORDER — IBUPROFEN 800 MG/1
800 TABLET ORAL 3 TIMES DAILY
Qty: 270 TABLET | Refills: 0 | Status: SHIPPED | OUTPATIENT
Start: 2023-02-16 | End: 2023-03-22 | Stop reason: SDUPTHER

## 2023-03-22 DIAGNOSIS — F41.1 GENERALIZED ANXIETY DISORDER: ICD-10-CM

## 2023-03-22 DIAGNOSIS — L02.416 ABSCESS OF LEFT LEG: ICD-10-CM

## 2023-03-22 RX ORDER — CLINDAMYCIN AND BENZOYL PEROXIDE 10; 50 MG/G; MG/G
GEL TOPICAL 2 TIMES DAILY
Qty: 50 G | Refills: 5 | Status: SHIPPED | OUTPATIENT
Start: 2023-03-22

## 2023-03-22 RX ORDER — PANTOPRAZOLE SODIUM 40 MG/1
40 TABLET, DELAYED RELEASE ORAL DAILY
Qty: 90 TABLET | Refills: 2 | Status: SHIPPED | OUTPATIENT
Start: 2023-03-22

## 2023-03-22 RX ORDER — DOXYCYCLINE HYCLATE 100 MG/1
CAPSULE ORAL
Qty: 20 CAPSULE | Refills: 0 | Status: CANCELLED | OUTPATIENT
Start: 2023-03-22

## 2023-03-22 RX ORDER — SERTRALINE HYDROCHLORIDE 100 MG/1
100 TABLET, FILM COATED ORAL DAILY
Qty: 90 TABLET | Refills: 0 | Status: SHIPPED | OUTPATIENT
Start: 2023-03-22

## 2023-03-22 RX ORDER — ATORVASTATIN CALCIUM 80 MG/1
80 TABLET, FILM COATED ORAL DAILY
Qty: 90 TABLET | Refills: 0 | Status: SHIPPED | OUTPATIENT
Start: 2023-03-22

## 2023-03-22 RX ORDER — LAMOTRIGINE 150 MG/1
150 TABLET ORAL 2 TIMES DAILY
Qty: 60 TABLET | Refills: 0 | Status: SHIPPED | OUTPATIENT
Start: 2023-03-22

## 2023-03-22 RX ORDER — CYCLOBENZAPRINE HCL 10 MG
10 TABLET ORAL NIGHTLY
Qty: 30 TABLET | Refills: 0 | Status: SHIPPED | OUTPATIENT
Start: 2023-03-22

## 2023-03-22 RX ORDER — EZETIMIBE 10 MG/1
10 TABLET ORAL DAILY
Qty: 90 TABLET | Refills: 0 | Status: SHIPPED | OUTPATIENT
Start: 2023-03-22

## 2023-03-22 RX ORDER — IBUPROFEN 800 MG/1
800 TABLET ORAL 3 TIMES DAILY
Qty: 270 TABLET | Refills: 0 | Status: SHIPPED | OUTPATIENT
Start: 2023-03-22

## 2023-03-22 NOTE — TELEPHONE ENCOUNTER
Left pending is ativan and trazadone.  Trazadone has a high warning in Epic with Tramadol.      Thank you

## 2023-03-23 RX ORDER — TRAZODONE HYDROCHLORIDE 50 MG/1
50 TABLET ORAL NIGHTLY
Qty: 90 TABLET | Refills: 0 | Status: SHIPPED | OUTPATIENT
Start: 2023-03-23

## 2023-03-23 RX ORDER — TRAMADOL HYDROCHLORIDE 50 MG/1
50 TABLET ORAL EVERY 8 HOURS PRN
Qty: 15 TABLET | Refills: 0 | Status: SHIPPED | OUTPATIENT
Start: 2023-03-23

## 2023-03-23 RX ORDER — LORAZEPAM 1 MG/1
1 TABLET ORAL EVERY 8 HOURS PRN
Qty: 30 TABLET | Refills: 3 | Status: SHIPPED | OUTPATIENT
Start: 2023-03-23

## 2023-04-25 DIAGNOSIS — F41.1 GENERALIZED ANXIETY DISORDER: ICD-10-CM

## 2023-04-25 DIAGNOSIS — L02.416 ABSCESS OF LEFT LEG: ICD-10-CM

## 2023-04-25 RX ORDER — LORAZEPAM 1 MG/1
1 TABLET ORAL EVERY 8 HOURS PRN
Qty: 30 TABLET | Refills: 3 | Status: SHIPPED | OUTPATIENT
Start: 2023-04-25

## 2023-04-25 RX ORDER — EZETIMIBE 10 MG/1
10 TABLET ORAL DAILY
Qty: 90 TABLET | Refills: 0 | Status: SHIPPED | OUTPATIENT
Start: 2023-04-25

## 2023-04-25 RX ORDER — LAMOTRIGINE 150 MG/1
150 TABLET ORAL 2 TIMES DAILY
Qty: 60 TABLET | Refills: 0 | Status: SHIPPED | OUTPATIENT
Start: 2023-04-25

## 2023-04-25 RX ORDER — IBUPROFEN 800 MG/1
800 TABLET ORAL 3 TIMES DAILY
Qty: 270 TABLET | Refills: 0 | Status: SHIPPED | OUTPATIENT
Start: 2023-04-25

## 2023-04-25 RX ORDER — TRAZODONE HYDROCHLORIDE 50 MG/1
50 TABLET ORAL NIGHTLY
Qty: 90 TABLET | Refills: 0 | Status: SHIPPED | OUTPATIENT
Start: 2023-04-25

## 2023-04-25 RX ORDER — TRAMADOL HYDROCHLORIDE 50 MG/1
50 TABLET ORAL EVERY 8 HOURS PRN
Qty: 15 TABLET | Refills: 0 | Status: SHIPPED | OUTPATIENT
Start: 2023-04-25

## 2023-04-25 RX ORDER — CYCLOBENZAPRINE HCL 10 MG
10 TABLET ORAL NIGHTLY
Qty: 30 TABLET | Refills: 0 | Status: SHIPPED | OUTPATIENT
Start: 2023-04-25

## 2023-04-25 RX ORDER — ATORVASTATIN CALCIUM 80 MG/1
80 TABLET, FILM COATED ORAL DAILY
Qty: 90 TABLET | Refills: 0 | Status: SHIPPED | OUTPATIENT
Start: 2023-04-25

## 2023-04-25 RX ORDER — PANTOPRAZOLE SODIUM 40 MG/1
40 TABLET, DELAYED RELEASE ORAL DAILY
Qty: 90 TABLET | Refills: 2 | Status: SHIPPED | OUTPATIENT
Start: 2023-04-25

## 2023-04-25 RX ORDER — CLINDAMYCIN AND BENZOYL PEROXIDE 10; 50 MG/G; MG/G
GEL TOPICAL 2 TIMES DAILY
Qty: 50 G | Refills: 5 | Status: SHIPPED | OUTPATIENT
Start: 2023-04-25

## 2023-04-25 RX ORDER — SERTRALINE HYDROCHLORIDE 100 MG/1
100 TABLET, FILM COATED ORAL DAILY
Qty: 90 TABLET | Refills: 0 | Status: SHIPPED | OUTPATIENT
Start: 2023-04-25

## 2023-05-08 ENCOUNTER — OFFICE VISIT (OUTPATIENT)
Dept: FAMILY MEDICINE CLINIC | Facility: CLINIC | Age: 38
End: 2023-05-08
Payer: MEDICARE

## 2023-05-08 VITALS
OXYGEN SATURATION: 98 % | HEIGHT: 65 IN | DIASTOLIC BLOOD PRESSURE: 70 MMHG | BODY MASS INDEX: 37.36 KG/M2 | HEART RATE: 84 BPM | SYSTOLIC BLOOD PRESSURE: 100 MMHG | WEIGHT: 224.2 LBS | TEMPERATURE: 98 F | RESPIRATION RATE: 20 BRPM

## 2023-05-08 DIAGNOSIS — F41.1 GENERALIZED ANXIETY DISORDER: ICD-10-CM

## 2023-05-08 DIAGNOSIS — E78.2 MIXED HYPERLIPIDEMIA: Primary | ICD-10-CM

## 2023-05-08 DIAGNOSIS — F33.41 RECURRENT MAJOR DEPRESSIVE DISORDER, IN PARTIAL REMISSION: ICD-10-CM

## 2023-05-08 PROCEDURE — 99214 OFFICE O/P EST MOD 30 MIN: CPT | Performed by: PHYSICIAN ASSISTANT

## 2023-05-08 PROCEDURE — 1160F RVW MEDS BY RX/DR IN RCRD: CPT | Performed by: PHYSICIAN ASSISTANT

## 2023-05-08 PROCEDURE — 1159F MED LIST DOCD IN RCRD: CPT | Performed by: PHYSICIAN ASSISTANT

## 2023-05-08 RX ORDER — SERTRALINE HYDROCHLORIDE 100 MG/1
100 TABLET, FILM COATED ORAL DAILY
Qty: 90 TABLET | Refills: 3 | Status: SHIPPED | OUTPATIENT
Start: 2023-05-08

## 2023-05-08 RX ORDER — EZETIMIBE 10 MG/1
10 TABLET ORAL DAILY
Qty: 90 TABLET | Refills: 3 | Status: SHIPPED | OUTPATIENT
Start: 2023-05-08 | End: 2023-05-23 | Stop reason: SDUPTHER

## 2023-05-08 RX ORDER — ATORVASTATIN CALCIUM 80 MG/1
80 TABLET, FILM COATED ORAL DAILY
Qty: 90 TABLET | Refills: 3 | Status: SHIPPED | OUTPATIENT
Start: 2023-05-08

## 2023-05-08 RX ORDER — LAMOTRIGINE 150 MG/1
150 TABLET ORAL 2 TIMES DAILY
Qty: 180 TABLET | Refills: 3 | Status: SHIPPED | OUTPATIENT
Start: 2023-05-08 | End: 2024-05-02

## 2023-05-08 RX ORDER — IBUPROFEN 800 MG/1
800 TABLET ORAL 3 TIMES DAILY
Qty: 270 TABLET | Refills: 3 | Status: SHIPPED | OUTPATIENT
Start: 2023-05-08

## 2023-05-08 RX ORDER — TRAZODONE HYDROCHLORIDE 50 MG/1
50 TABLET ORAL NIGHTLY
Qty: 90 TABLET | Refills: 3 | Status: SHIPPED | OUTPATIENT
Start: 2023-05-08

## 2023-05-08 RX ORDER — CYCLOBENZAPRINE HCL 10 MG
10 TABLET ORAL NIGHTLY
Qty: 30 TABLET | Refills: 5 | Status: SHIPPED | OUTPATIENT
Start: 2023-05-08 | End: 2023-05-23 | Stop reason: SDUPTHER

## 2023-05-08 NOTE — PROGRESS NOTES
"Subjective   Elyssa Olivarez is a 37 y.o. female.     Chief Complaint   Patient presents with   • Follow-up     Follow up on high cholestrol       /70   Pulse 84   Temp 98 °F (36.7 °C) (Infrared)   Resp 20   Ht 165.1 cm (65\")   Wt 102 kg (224 lb 3.2 oz)   SpO2 98%   BMI 37.31 kg/m²     BP Readings from Last 3 Encounters:   05/08/23 100/70   12/30/22 119/83   12/15/22 140/80       Wt Readings from Last 3 Encounters:   05/08/23 102 kg (224 lb 3.2 oz)   12/30/22 97.1 kg (214 lb)   12/15/22 97.3 kg (214 lb 6.4 oz)       HPI Presents to the clinic for follow up on hld, obesity, mdd. She has been doing ok but she is having a lot of dental work done and has been on steroids and has had weight gain. No chest pain or soa. She is not working at this time but is going to try to get back into her job when they hire. Also following up for MARGARET and MDD. Doing well on current medications. No SI or Hi.     The following portions of the patient's history were reviewed and updated as appropriate: allergies, current medications, past family history, past medical history, past social history, past surgical history and problem list.    Review of Systems    Objective   Physical Exam  Constitutional:       Appearance: Normal appearance. She is obese.   Eyes:      Extraocular Movements: Extraocular movements intact.      Pupils: Pupils are equal, round, and reactive to light.   Cardiovascular:      Rate and Rhythm: Normal rate.      Heart sounds: No murmur heard.  Pulmonary:      Effort: Pulmonary effort is normal.      Breath sounds: No wheezing.   Abdominal:      General: There is no distension.   Neurological:      General: No focal deficit present.      Mental Status: She is alert and oriented to person, place, and time.   Psychiatric:         Mood and Affect: Mood normal.         Behavior: Behavior normal.           Diagnoses and all orders for this visit:    1. Mixed hyperlipidemia (Primary)    2. Generalized anxiety " disorder    3. Recurrent major depressive disorder, in partial remission    Other orders  -     atorvastatin (LIPITOR) 80 MG tablet; Take 1 tablet by mouth Daily.  Dispense: 90 tablet; Refill: 3  -     Discontinue: cyclobenzaprine (FLEXERIL) 10 MG tablet; Take 1 tablet by mouth Every Night.  Dispense: 30 tablet; Refill: 5  -     Discontinue: ezetimibe (Zetia) 10 MG tablet; Take 1 tablet by mouth Daily. For cholesterol  Dispense: 90 tablet; Refill: 3  -     ibuprofen (ADVIL,MOTRIN) 800 MG tablet; Take 1 tablet by mouth 3 (Three) Times a Day.  Dispense: 270 tablet; Refill: 3  -     lamoTRIgine (LaMICtal) 150 MG tablet; Take 1 tablet by mouth 2 (Two) Times a Day for 360 days.  Dispense: 180 tablet; Refill: 3  -     sertraline (ZOLOFT) 100 MG tablet; Take 1 tablet by mouth Daily. with food.  Dispense: 90 tablet; Refill: 3  -     traZODone (DESYREL) 50 MG tablet; Take 1 tablet by mouth Every Night.  Dispense: 90 tablet; Refill: 3    Continue current medications- begin weight loss process again. She has been on steroids lately which isnt helping but really needs to watch as we discussed what she eats and get back in to walking.     Return in about 4 months (around 9/8/2023) for Recheck.

## 2023-05-23 DIAGNOSIS — F41.1 GENERALIZED ANXIETY DISORDER: ICD-10-CM

## 2023-05-25 RX ORDER — CYCLOBENZAPRINE HCL 10 MG
10 TABLET ORAL NIGHTLY
Qty: 30 TABLET | Refills: 5 | Status: SHIPPED | OUTPATIENT
Start: 2023-05-25

## 2023-05-25 RX ORDER — EZETIMIBE 10 MG/1
10 TABLET ORAL DAILY
Qty: 90 TABLET | Refills: 3 | Status: SHIPPED | OUTPATIENT
Start: 2023-05-25

## 2023-05-25 RX ORDER — LORAZEPAM 1 MG/1
1 TABLET ORAL EVERY 8 HOURS PRN
Qty: 30 TABLET | Refills: 3 | Status: SHIPPED | OUTPATIENT
Start: 2023-05-25

## 2023-08-13 DIAGNOSIS — F41.1 GENERALIZED ANXIETY DISORDER: ICD-10-CM

## 2023-08-14 RX ORDER — SERTRALINE HYDROCHLORIDE 100 MG/1
100 TABLET, FILM COATED ORAL DAILY
Qty: 90 TABLET | Refills: 1 | Status: SHIPPED | OUTPATIENT
Start: 2023-08-14

## 2023-08-14 RX ORDER — PANTOPRAZOLE SODIUM 40 MG/1
40 TABLET, DELAYED RELEASE ORAL DAILY
Qty: 90 TABLET | Refills: 1 | Status: SHIPPED | OUTPATIENT
Start: 2023-08-14

## 2023-08-14 RX ORDER — LORAZEPAM 1 MG/1
1 TABLET ORAL EVERY 8 HOURS PRN
Qty: 30 TABLET | Refills: 3 | Status: SHIPPED | OUTPATIENT
Start: 2023-08-14

## 2023-08-14 RX ORDER — CYCLOBENZAPRINE HCL 10 MG
10 TABLET ORAL NIGHTLY
Qty: 30 TABLET | Refills: 5 | Status: SHIPPED | OUTPATIENT
Start: 2023-08-14

## 2023-08-14 RX ORDER — EZETIMIBE 10 MG/1
10 TABLET ORAL DAILY
Qty: 90 TABLET | Refills: 1 | Status: SHIPPED | OUTPATIENT
Start: 2023-08-14

## 2023-08-14 RX ORDER — ATORVASTATIN CALCIUM 80 MG/1
80 TABLET, FILM COATED ORAL DAILY
Qty: 90 TABLET | Refills: 1 | Status: SHIPPED | OUTPATIENT
Start: 2023-08-14

## 2023-08-14 RX ORDER — LAMOTRIGINE 150 MG/1
150 TABLET ORAL 2 TIMES DAILY
Qty: 180 TABLET | Refills: 1 | Status: SHIPPED | OUTPATIENT
Start: 2023-08-14 | End: 2024-08-08

## 2023-08-14 RX ORDER — IBUPROFEN 800 MG/1
800 TABLET ORAL 3 TIMES DAILY
Qty: 270 TABLET | Refills: 0 | Status: SHIPPED | OUTPATIENT
Start: 2023-08-14

## 2023-08-14 RX ORDER — CLINDAMYCIN AND BENZOYL PEROXIDE 10; 50 MG/G; MG/G
GEL TOPICAL 2 TIMES DAILY
Qty: 50 G | Refills: 5 | Status: SHIPPED | OUTPATIENT
Start: 2023-08-14

## 2023-09-08 ENCOUNTER — OFFICE VISIT (OUTPATIENT)
Dept: FAMILY MEDICINE CLINIC | Facility: CLINIC | Age: 38
End: 2023-09-08
Payer: MEDICARE

## 2023-09-08 VITALS
HEART RATE: 87 BPM | SYSTOLIC BLOOD PRESSURE: 114 MMHG | HEIGHT: 65 IN | WEIGHT: 224.8 LBS | BODY MASS INDEX: 37.45 KG/M2 | DIASTOLIC BLOOD PRESSURE: 73 MMHG | OXYGEN SATURATION: 99 % | RESPIRATION RATE: 20 BRPM | TEMPERATURE: 98 F

## 2023-09-08 DIAGNOSIS — E66.01 CLASS 2 SEVERE OBESITY DUE TO EXCESS CALORIES WITH SERIOUS COMORBIDITY AND BODY MASS INDEX (BMI) OF 35.0 TO 35.9 IN ADULT: ICD-10-CM

## 2023-09-08 DIAGNOSIS — Q65.89 HIP DYSPLASIA: ICD-10-CM

## 2023-09-08 DIAGNOSIS — E78.2 MIXED HYPERLIPIDEMIA: Primary | ICD-10-CM

## 2023-09-08 NOTE — PROGRESS NOTES
"Subjective   Elyssa Olivarez is a 37 y.o. female.     Chief Complaint   Patient presents with    Follow-up     Follow up on cholesterol        /73   Pulse 87   Temp 98 °F (36.7 °C) (Infrared)   Resp 20   Ht 165.1 cm (65\")   Wt 102 kg (224 lb 12.8 oz)   SpO2 99%   BMI 37.41 kg/m²     BP Readings from Last 3 Encounters:   09/08/23 114/73   05/08/23 100/70   12/30/22 119/83       Wt Readings from Last 3 Encounters:   09/08/23 102 kg (224 lb 12.8 oz)   05/08/23 102 kg (224 lb 3.2 oz)   12/30/22 97.1 kg (214 lb)       HPI Presents to the clinic for follow up on obesity, hld, anxiety. She has been doing pretty well. Has maintained her weight. Her hip hurts so she has trouble walking. Her cholesterol is high but she is taking lipitor. Has been compliant with this. No chest pain or soa.     The following portions of the patient's history were reviewed and updated as appropriate: allergies, current medications, past family history, past medical history, past social history, past surgical history, and problem list.    Review of Systems    Objective   Physical Exam  Constitutional:       Appearance: Normal appearance. She is obese.   HENT:      Right Ear: Tympanic membrane normal.      Left Ear: Tympanic membrane normal.      Nose: No congestion.      Mouth/Throat:      Pharynx: No oropharyngeal exudate.   Eyes:      Extraocular Movements: Extraocular movements intact.      Pupils: Pupils are equal, round, and reactive to light.   Cardiovascular:      Rate and Rhythm: Normal rate.      Heart sounds: No murmur heard.  Pulmonary:      Effort: Pulmonary effort is normal.      Breath sounds: No wheezing.   Neurological:      General: No focal deficit present.      Mental Status: She is alert and oriented to person, place, and time.   Psychiatric:         Mood and Affect: Mood normal.         Behavior: Behavior normal.         Diagnoses and all orders for this visit:    1. Mixed hyperlipidemia (Primary)    2. Class " 2 severe obesity due to excess calories with serious comorbidity and body mass index (BMI) of 35.0 to 35.9 in adult    3. Hip dysplasia    Continue current medications. Work on going to the [employer] 2 times per week for aquatic exercise. Work on diet as discussed.     Return in about 6 months (around 3/8/2024), or if symptoms worsen or fail to improve, for Recheck.

## 2023-11-09 RX ORDER — IBUPROFEN 800 MG/1
800 TABLET ORAL 3 TIMES DAILY
Qty: 270 TABLET | Refills: 0 | Status: SHIPPED | OUTPATIENT
Start: 2023-11-09

## 2023-11-09 RX ORDER — ATORVASTATIN CALCIUM 80 MG/1
80 TABLET, FILM COATED ORAL DAILY
Qty: 90 TABLET | Refills: 1 | Status: SHIPPED | OUTPATIENT
Start: 2023-11-09

## 2023-11-09 RX ORDER — PANTOPRAZOLE SODIUM 40 MG/1
40 TABLET, DELAYED RELEASE ORAL DAILY
Qty: 90 TABLET | Refills: 1 | Status: SHIPPED | OUTPATIENT
Start: 2023-11-09

## 2023-11-09 RX ORDER — CLINDAMYCIN AND BENZOYL PEROXIDE 10; 50 MG/G; MG/G
GEL TOPICAL 2 TIMES DAILY
Qty: 50 G | Refills: 5 | Status: SHIPPED | OUTPATIENT
Start: 2023-11-09

## 2023-11-09 RX ORDER — EZETIMIBE 10 MG/1
10 TABLET ORAL DAILY
Qty: 90 TABLET | Refills: 1 | Status: SHIPPED | OUTPATIENT
Start: 2023-11-09

## 2023-11-09 RX ORDER — SERTRALINE HYDROCHLORIDE 100 MG/1
100 TABLET, FILM COATED ORAL DAILY
Qty: 90 TABLET | Refills: 1 | Status: SHIPPED | OUTPATIENT
Start: 2023-11-09

## 2023-11-09 RX ORDER — LAMOTRIGINE 150 MG/1
150 TABLET ORAL 2 TIMES DAILY
Qty: 180 TABLET | Refills: 1 | Status: SHIPPED | OUTPATIENT
Start: 2023-11-09 | End: 2024-11-03

## 2023-12-18 ENCOUNTER — TELEPHONE (OUTPATIENT)
Dept: FAMILY MEDICINE CLINIC | Facility: CLINIC | Age: 38
End: 2023-12-18
Payer: MEDICARE

## 2023-12-18 NOTE — TELEPHONE ENCOUNTER
Caller: SHIRLEY MUELLER    Relationship: Mother    Best call back number: [phone]     What medication are you requesting: MEDICATION TO HELP TREAT COVID SYMPTOMS    What are your current symptoms: FEVER, COUGH, CONGESTION     How long have you been experiencing symptoms: 3-4 DAYS      If a prescription is needed, what is your preferred pharmacy and phone number: [id number] - [place], [place] - [address] [phone] [facility] [phone]      Additional notes: PATIENTS FATHER TESTED POSITIVE FOR COVID. PATIENTS MOTHER STATED THAT PATIENT HAS A FEVER COUGH AND CONGESTION AS WELL. PATIENTS MOTHER IS REQUESTING MEDICATION TO TREAT COVID FOR PATIENT    PLEASE ADVISE

## 2024-02-02 DIAGNOSIS — F41.1 GENERALIZED ANXIETY DISORDER: ICD-10-CM

## 2024-02-05 RX ORDER — TRAZODONE HYDROCHLORIDE 50 MG/1
50 TABLET ORAL NIGHTLY
Qty: 90 TABLET | Refills: 3 | Status: SHIPPED | OUTPATIENT
Start: 2024-02-05

## 2024-02-05 RX ORDER — PANTOPRAZOLE SODIUM 40 MG/1
40 TABLET, DELAYED RELEASE ORAL DAILY
Qty: 90 TABLET | Refills: 1 | Status: SHIPPED | OUTPATIENT
Start: 2024-02-05

## 2024-02-05 RX ORDER — IBUPROFEN 800 MG/1
800 TABLET ORAL 3 TIMES DAILY
Qty: 270 TABLET | Refills: 0 | Status: SHIPPED | OUTPATIENT
Start: 2024-02-05

## 2024-02-05 RX ORDER — SERTRALINE HYDROCHLORIDE 100 MG/1
100 TABLET, FILM COATED ORAL DAILY
Qty: 90 TABLET | Refills: 1 | Status: SHIPPED | OUTPATIENT
Start: 2024-02-05

## 2024-02-05 RX ORDER — ATORVASTATIN CALCIUM 80 MG/1
80 TABLET, FILM COATED ORAL DAILY
Qty: 90 TABLET | Refills: 1 | Status: SHIPPED | OUTPATIENT
Start: 2024-02-05

## 2024-02-05 RX ORDER — LAMOTRIGINE 150 MG/1
150 TABLET ORAL 2 TIMES DAILY
Qty: 180 TABLET | Refills: 1 | Status: SHIPPED | OUTPATIENT
Start: 2024-02-05 | End: 2025-01-30

## 2024-02-05 RX ORDER — LORAZEPAM 1 MG/1
1 TABLET ORAL EVERY 8 HOURS PRN
Qty: 30 TABLET | Refills: 3 | Status: SHIPPED | OUTPATIENT
Start: 2024-02-05

## 2024-02-05 RX ORDER — EZETIMIBE 10 MG/1
10 TABLET ORAL DAILY
Qty: 90 TABLET | Refills: 1 | Status: SHIPPED | OUTPATIENT
Start: 2024-02-05

## 2024-02-05 RX ORDER — CYCLOBENZAPRINE HCL 10 MG
10 TABLET ORAL NIGHTLY
Qty: 30 TABLET | Refills: 5 | Status: SHIPPED | OUTPATIENT
Start: 2024-02-05

## 2024-02-05 RX ORDER — CLINDAMYCIN AND BENZOYL PEROXIDE 10; 50 MG/G; MG/G
GEL TOPICAL 2 TIMES DAILY
Qty: 50 G | Refills: 5 | Status: SHIPPED | OUTPATIENT
Start: 2024-02-05

## 2024-02-28 ENCOUNTER — TELEPHONE (OUTPATIENT)
Dept: FAMILY MEDICINE CLINIC | Facility: CLINIC | Age: 39
End: 2024-02-28
Payer: MEDICARE

## 2024-02-28 NOTE — LETTER
"February 29, 2024     Elyssa Olivarez \"Janki\"  [address] [place] [address]    Patient: Elyssa Olivarez   YOB: 1985   Date of Visit: 2/28/2024     Dear [facility]       It is my medical opinion that Elyssa does require continued care, twenty-four hours per day, seven days per week, due to hip dysplasia and autism.  Please do not hesitate to contact me if you have any further questions at [phone].         Sincerely,        Vimal Moseley PA-C        CC: No Recipients    "

## 2024-02-28 NOTE — TELEPHONE ENCOUNTER
Caller: SHIRLEY MUELLER    Relationship: Mother    Best call back number: [phone]    What form or medical record are you requesting: RENEWAL LETTER STATING PT STILL REQUIRES 24/7 CARE      Who is requesting this form or medical record from you: [facility]     How would you like to receive the form or medical records (pick-up, mail, fax): FAX    ATTN: KEVEN CAMERON RN   If fax, what is the fax number: [phone]    Timeframe paperwork needed: 3/1/24

## 2024-03-15 RX ORDER — CYCLOBENZAPRINE HCL 10 MG
10 TABLET ORAL NIGHTLY
Qty: 30 TABLET | Refills: 5 | Status: SHIPPED | OUTPATIENT
Start: 2024-03-15

## 2024-03-15 RX ORDER — SERTRALINE HYDROCHLORIDE 100 MG/1
100 TABLET, FILM COATED ORAL DAILY
Qty: 90 TABLET | Refills: 1 | Status: SHIPPED | OUTPATIENT
Start: 2024-03-15

## 2024-03-15 RX ORDER — CLINDAMYCIN AND BENZOYL PEROXIDE 10; 50 MG/G; MG/G
GEL TOPICAL 2 TIMES DAILY
Qty: 50 G | Refills: 5 | Status: SHIPPED | OUTPATIENT
Start: 2024-03-15

## 2024-03-15 RX ORDER — TRAZODONE HYDROCHLORIDE 50 MG/1
50 TABLET ORAL NIGHTLY
Qty: 90 TABLET | Refills: 3 | Status: SHIPPED | OUTPATIENT
Start: 2024-03-15

## 2024-03-15 RX ORDER — LAMOTRIGINE 150 MG/1
150 TABLET ORAL 2 TIMES DAILY
Qty: 180 TABLET | Refills: 1 | Status: SHIPPED | OUTPATIENT
Start: 2024-03-15 | End: 2025-03-10

## 2024-03-15 RX ORDER — IBUPROFEN 800 MG/1
800 TABLET ORAL 3 TIMES DAILY
Qty: 270 TABLET | Refills: 0 | Status: SHIPPED | OUTPATIENT
Start: 2024-03-15

## 2024-03-15 RX ORDER — ATORVASTATIN CALCIUM 80 MG/1
80 TABLET, FILM COATED ORAL DAILY
Qty: 90 TABLET | Refills: 1 | Status: SHIPPED | OUTPATIENT
Start: 2024-03-15

## 2024-03-15 RX ORDER — EZETIMIBE 10 MG/1
10 TABLET ORAL DAILY
Qty: 90 TABLET | Refills: 1 | Status: SHIPPED | OUTPATIENT
Start: 2024-03-15

## 2024-06-28 ENCOUNTER — OFFICE VISIT (OUTPATIENT)
Dept: FAMILY MEDICINE CLINIC | Facility: CLINIC | Age: 39
End: 2024-06-28
Payer: MEDICARE

## 2024-06-28 VITALS
SYSTOLIC BLOOD PRESSURE: 124 MMHG | BODY MASS INDEX: 39.29 KG/M2 | RESPIRATION RATE: 18 BRPM | HEIGHT: 65 IN | HEART RATE: 95 BPM | OXYGEN SATURATION: 97 % | DIASTOLIC BLOOD PRESSURE: 78 MMHG | WEIGHT: 235.8 LBS

## 2024-06-28 DIAGNOSIS — Q65.89 HIP DYSPLASIA, CONGENITAL: Primary | ICD-10-CM

## 2024-06-28 PROCEDURE — 1126F AMNT PAIN NOTED NONE PRSNT: CPT | Performed by: PHYSICIAN ASSISTANT

## 2024-06-28 PROCEDURE — 99213 OFFICE O/P EST LOW 20 MIN: CPT | Performed by: PHYSICIAN ASSISTANT

## 2024-06-28 RX ORDER — CALCIUM CARBONATE 160(400)MG
1 TABLET,CHEWABLE ORAL DAILY
Qty: 1 EACH | Refills: 0 | Status: SHIPPED | OUTPATIENT
Start: 2024-06-28

## 2024-06-28 NOTE — PROGRESS NOTES
"Subjective   Elyssa Olivarez is a 38 y.o. female.     Chief Complaint   Patient presents with    Difficulty Walking     Can't walk without falling        /78   Pulse 95   Resp 18   Ht 165.1 cm (65\")   Wt 107 kg (235 lb 12.8 oz)   SpO2 97%   BMI 39.24 kg/m²     BP Readings from Last 3 Encounters:   06/28/24 124/78   09/08/23 114/73   05/08/23 100/70       Wt Readings from Last 3 Encounters:   06/28/24 107 kg (235 lb 12.8 oz)   09/08/23 102 kg (224 lb 12.8 oz)   05/08/23 102 kg (224 lb 3.2 oz)       Difficulty Walking     Presents to the clinic for worsening hip dysplasia. She has had this since she was born and has done as well as possible for the past 36 years. She has had a lot of worsening pain. She can't seem to walk without falling. She has had multiple falls over the past few months.  Family is requesting a wheelchair and or a rollator to help her with ambulation.    The following portions of the patient's history were reviewed and updated as appropriate: allergies, current medications, past family history, past medical history, past social history, past surgical history, and problem list.    Review of Systems    Objective   Physical Exam  Constitutional:       Appearance: Normal appearance.   Eyes:      Extraocular Movements: Extraocular movements intact.      Pupils: Pupils are equal, round, and reactive to light.   Neurological:      General: No focal deficit present.      Mental Status: She is alert and oriented to person, place, and time.   Psychiatric:         Mood and Affect: Mood normal.         Behavior: Behavior normal.           Diagnoses and all orders for this visit:    1. Hip dysplasia, congenital (Primary)  -     Miscellaneous DME  -     XR Hips Bilateral With or Without Pelvis 2 View; Future    Other orders  -     Misc. Devices (Rollator Ultra-Light) misc; Use 1 Units Daily.  Dispense: 1 each; Refill: 0    We will start out by getting an x-ray of the hips at this time and " consider having her see an orthopedic doctor.  I do think that she would benefit from using a rollator and so I will place an order.  She has had recent falls and instability and this would hopefully prevent falls.  We will also consider having her see physical therapy in the future as well however structurally we need to check the hips first    Return in about 4 months (around 10/28/2024), or if symptoms worsen or fail to improve, for Recheck.

## 2024-07-01 DIAGNOSIS — Q65.89 HIP DYSPLASIA, CONGENITAL: ICD-10-CM

## 2024-07-02 DIAGNOSIS — Q65.89 HIP DYSPLASIA, CONGENITAL: Primary | ICD-10-CM

## 2024-07-10 DIAGNOSIS — F41.1 GENERALIZED ANXIETY DISORDER: ICD-10-CM

## 2024-07-11 RX ORDER — IBUPROFEN 800 MG/1
800 TABLET ORAL 3 TIMES DAILY
Qty: 270 TABLET | Refills: 0 | Status: SHIPPED | OUTPATIENT
Start: 2024-07-11

## 2024-07-11 RX ORDER — SERTRALINE HYDROCHLORIDE 100 MG/1
100 TABLET, FILM COATED ORAL DAILY
Qty: 90 TABLET | Refills: 1 | Status: SHIPPED | OUTPATIENT
Start: 2024-07-11

## 2024-07-11 RX ORDER — CLINDAMYCIN AND BENZOYL PEROXIDE 10; 50 MG/G; MG/G
GEL TOPICAL 2 TIMES DAILY
Qty: 50 G | Refills: 5 | Status: SHIPPED | OUTPATIENT
Start: 2024-07-11

## 2024-07-11 RX ORDER — EZETIMIBE 10 MG/1
10 TABLET ORAL DAILY
Qty: 90 TABLET | Refills: 1 | Status: SHIPPED | OUTPATIENT
Start: 2024-07-11

## 2024-07-11 RX ORDER — LORAZEPAM 1 MG/1
1 TABLET ORAL EVERY 8 HOURS PRN
Qty: 30 TABLET | Refills: 3 | Status: SHIPPED | OUTPATIENT
Start: 2024-07-11

## 2024-07-11 RX ORDER — ATORVASTATIN CALCIUM 80 MG/1
80 TABLET, FILM COATED ORAL DAILY
Qty: 90 TABLET | Refills: 1 | Status: SHIPPED | OUTPATIENT
Start: 2024-07-11

## 2024-07-11 RX ORDER — PANTOPRAZOLE SODIUM 40 MG/1
40 TABLET, DELAYED RELEASE ORAL DAILY
Qty: 90 TABLET | Refills: 1 | Status: SHIPPED | OUTPATIENT
Start: 2024-07-11

## 2024-07-11 RX ORDER — LAMOTRIGINE 150 MG/1
150 TABLET ORAL 2 TIMES DAILY
Qty: 180 TABLET | Refills: 1 | Status: SHIPPED | OUTPATIENT
Start: 2024-07-11 | End: 2025-07-06

## 2024-07-11 RX ORDER — TRAZODONE HYDROCHLORIDE 50 MG/1
50 TABLET ORAL NIGHTLY
Qty: 90 TABLET | Refills: 3 | Status: SHIPPED | OUTPATIENT
Start: 2024-07-11

## 2024-07-11 RX ORDER — CYCLOBENZAPRINE HCL 10 MG
10 TABLET ORAL NIGHTLY
Qty: 30 TABLET | Refills: 5 | Status: SHIPPED | OUTPATIENT
Start: 2024-07-11

## 2024-09-06 ENCOUNTER — TELEPHONE (OUTPATIENT)
Dept: FAMILY MEDICINE CLINIC | Facility: CLINIC | Age: 39
End: 2024-09-06
Payer: MEDICARE

## 2024-09-06 NOTE — TELEPHONE ENCOUNTER
Gave complete message to Sofya with [facility] at 2:32pm. Taylor was not available and Sofya said she would relay message.

## 2024-09-06 NOTE — TELEPHONE ENCOUNTER
Caller: [facility]- [facility]    Relationship: Home Health    Best call back number: [phone]    What orders are you requesting (i.e. lab or imaging): PLAN OF CARE # [id number]    In what timeframe would the patient need to come in: AS SOON AS POSSIBLE    Where will you receive your lab/imaging services: IN HOME    Additional notes: PLAN OF CARE WAS FAXED ON 9/4/24 AND AGAIN TODAY 9/6/24. THIS CAN BE SIGNED BY ANY PROVIDER SINCE PATIENT'S PROVIDER IS ON LEAVE. [facility]ANY WILL CALLBACK ON MONDAY TO FOLLOW UP IF THEY HAVE NOT RECEIVED A SIGNED COPY OF THE PLAN OF CARE.

## 2024-09-06 NOTE — TELEPHONE ENCOUNTER
Please call and let them know I will have Dr. Ordoñez sign this and will fax this back to them by end of day today. Thank you.

## 2024-09-20 ENCOUNTER — OFFICE VISIT (OUTPATIENT)
Dept: ORTHOPEDIC SURGERY | Facility: CLINIC | Age: 39
End: 2024-09-20
Payer: MEDICARE

## 2024-09-20 VITALS — OXYGEN SATURATION: 99 % | BODY MASS INDEX: 39.24 KG/M2 | RESPIRATION RATE: 20 BRPM | HEIGHT: 65 IN

## 2024-09-20 DIAGNOSIS — M54.32 BILATERAL SCIATICA: Primary | ICD-10-CM

## 2024-09-20 DIAGNOSIS — M54.31 BILATERAL SCIATICA: Primary | ICD-10-CM

## 2024-09-20 RX ORDER — MELOXICAM 15 MG/1
1 TABLET ORAL DAILY
COMMUNITY
End: 2024-09-20

## 2024-09-20 RX ORDER — MELOXICAM 15 MG/1
15 TABLET ORAL DAILY
Qty: 90 TABLET | Refills: 1 | Status: SHIPPED | OUTPATIENT
Start: 2024-09-20

## 2024-09-20 RX ORDER — METHYLPREDNISOLONE 4 MG
TABLET, DOSE PACK ORAL
Qty: 21 TABLET | Refills: 0 | Status: SHIPPED | OUTPATIENT
Start: 2024-09-20

## 2024-10-18 DIAGNOSIS — F41.1 GENERALIZED ANXIETY DISORDER: ICD-10-CM

## 2024-10-21 RX ORDER — LORAZEPAM 1 MG/1
1 TABLET ORAL EVERY 8 HOURS PRN
Qty: 30 TABLET | Refills: 3 | Status: SHIPPED | OUTPATIENT
Start: 2024-10-21

## 2024-10-21 RX ORDER — CYCLOBENZAPRINE HCL 10 MG
10 TABLET ORAL NIGHTLY
Qty: 30 TABLET | Refills: 5 | Status: SHIPPED | OUTPATIENT
Start: 2024-10-21

## 2024-10-21 RX ORDER — TRAZODONE HYDROCHLORIDE 50 MG/1
50 TABLET, FILM COATED ORAL NIGHTLY
Qty: 90 TABLET | Refills: 3 | Status: SHIPPED | OUTPATIENT
Start: 2024-10-21

## 2024-11-21 ENCOUNTER — OFFICE VISIT (OUTPATIENT)
Dept: FAMILY MEDICINE CLINIC | Facility: CLINIC | Age: 39
End: 2024-11-21
Payer: MEDICARE

## 2024-11-21 VITALS
BODY MASS INDEX: 39.35 KG/M2 | RESPIRATION RATE: 14 BRPM | WEIGHT: 236.2 LBS | OXYGEN SATURATION: 96 % | DIASTOLIC BLOOD PRESSURE: 86 MMHG | HEART RATE: 99 BPM | HEIGHT: 65 IN | SYSTOLIC BLOOD PRESSURE: 122 MMHG

## 2024-11-21 DIAGNOSIS — M54.10 RADICULAR LEG PAIN: ICD-10-CM

## 2024-11-21 DIAGNOSIS — M54.42 ACUTE LEFT-SIDED LOW BACK PAIN WITH LEFT-SIDED SCIATICA: Primary | ICD-10-CM

## 2024-11-21 PROCEDURE — 1126F AMNT PAIN NOTED NONE PRSNT: CPT | Performed by: PHYSICIAN ASSISTANT

## 2024-11-21 PROCEDURE — 99214 OFFICE O/P EST MOD 30 MIN: CPT | Performed by: PHYSICIAN ASSISTANT

## 2024-11-21 RX ORDER — GABAPENTIN 300 MG/1
300 CAPSULE ORAL NIGHTLY
Qty: 30 CAPSULE | Refills: 1 | Status: SHIPPED | OUTPATIENT
Start: 2024-11-21 | End: 2024-12-05

## 2024-11-21 RX ORDER — HYDROCODONE BITARTRATE AND ACETAMINOPHEN 5; 325 MG/1; MG/1
1 TABLET ORAL EVERY 6 HOURS PRN
Qty: 21 TABLET | Refills: 0 | Status: SHIPPED | OUTPATIENT
Start: 2024-11-21

## 2024-11-21 NOTE — PROGRESS NOTES
"Subjective   Elyssa Olivarez is a 38 y.o. female.     Chief Complaint   Patient presents with    Leg Pain     L leg. And hip       /86 (BP Location: Left arm, Patient Position: Sitting, Cuff Size: Large Adult)   Pulse 99   Resp 14   Ht 165.1 cm (65\")   Wt 107 kg (236 lb 3.2 oz)   SpO2 96%   BMI 39.31 kg/m²     BP Readings from Last 3 Encounters:   12/05/24 111/65   11/21/24 122/86   06/28/24 124/78       Wt Readings from Last 3 Encounters:   12/05/24 104 kg (229 lb 11.5 oz)   11/21/24 107 kg (236 lb 3.2 oz)   06/28/24 107 kg (235 lb 12.8 oz)       HPI patient presents to the clinic with her mother due to hip and back pain on the left side.  She has been struggling with this pain over the last couple months.  She saw orthopedics for this and was told that she had sciatica.  She has tried treatment with steroids and muscle relaxers and has not had any significant improvement.  She complains of tingling and numbness in the left leg to the level of the foot. She denies bowel or bladder incontinence or retention. She doesn't have overt weakness in the leg but the pain makes her feel weak at times. She doesn't have a longstanding history of back pain but does have a history of hip pain from dysplasia.     The following portions of the patient's history were reviewed and updated as appropriate: allergies, current medications, past family history, past medical history, past social history, past surgical history, and problem list.    Review of Systems    Objective   Physical Exam  Constitutional:       Appearance: Normal appearance.   Eyes:      Extraocular Movements: Extraocular movements intact.      Pupils: Pupils are equal, round, and reactive to light.   Neurological:      General: No focal deficit present.      Mental Status: She is alert and oriented to person, place, and time.   Psychiatric:         Mood and Affect: Mood normal.         Behavior: Behavior normal.           Diagnoses and all orders for " this visit:    1. Acute left-sided low back pain with left-sided sciatica (Primary)  -     HYDROcodone-acetaminophen (NORCO) 5-325 MG per tablet; Take 1 tablet by mouth Every 6 (Six) Hours As Needed for Moderate Pain.  Dispense: 21 tablet; Refill: 0  -     MRI Lumbar Spine Without Contrast; Future    2. Radicular leg pain  -     HYDROcodone-acetaminophen (NORCO) 5-325 MG per tablet; Take 1 tablet by mouth Every 6 (Six) Hours As Needed for Moderate Pain.  Dispense: 21 tablet; Refill: 0  -     MRI Lumbar Spine Without Contrast; Future    Other orders  -     Discontinue: gabapentin (NEURONTIN) 300 MG capsule; Take 1 capsule by mouth Every Night.  Dispense: 30 capsule; Refill: 1    This back pain is new and very different than Jankis previous issues with her hips. I think its possible this could be sciatica, but the possible weakness in the leg and changing nature of her symptoms we need to get an MRI to evaluate what is going on.We can do a short course of pain medication due to the severity of pain she is currently in. Hydrate and start stool softener with the pain medication.     Return in about 4 weeks (around 12/19/2024), or if symptoms worsen or fail to improve, for Recheck.

## 2024-12-02 DIAGNOSIS — G95.89 MASS OF SPINAL CORD: Primary | ICD-10-CM

## 2024-12-02 DIAGNOSIS — M54.32 LEFT SIDED SCIATICA: ICD-10-CM

## 2024-12-02 DIAGNOSIS — G89.29 CHRONIC MIDLINE THORACIC BACK PAIN: ICD-10-CM

## 2024-12-02 DIAGNOSIS — M54.6 CHRONIC MIDLINE THORACIC BACK PAIN: ICD-10-CM

## 2024-12-04 ENCOUNTER — TELEPHONE (OUTPATIENT)
Dept: FAMILY MEDICINE CLINIC | Facility: CLINIC | Age: 39
End: 2024-12-04
Payer: MEDICARE

## 2024-12-04 ENCOUNTER — APPOINTMENT (OUTPATIENT)
Dept: GENERAL RADIOLOGY | Facility: HOSPITAL | Age: 39
DRG: 565 | End: 2024-12-04
Payer: MEDICARE

## 2024-12-04 ENCOUNTER — HOSPITAL ENCOUNTER (INPATIENT)
Facility: HOSPITAL | Age: 39
LOS: 1 days | Discharge: HOME HEALTH | DRG: 565 | End: 2024-12-06
Attending: EMERGENCY MEDICINE | Admitting: EMERGENCY MEDICINE
Payer: MEDICARE

## 2024-12-04 DIAGNOSIS — R93.89 ABNORMAL MRI: ICD-10-CM

## 2024-12-04 DIAGNOSIS — M54.50 ACUTE LOW BACK PAIN, UNSPECIFIED BACK PAIN LATERALITY, UNSPECIFIED WHETHER SCIATICA PRESENT: Primary | ICD-10-CM

## 2024-12-04 DIAGNOSIS — M54.10 RADICULAR LEG PAIN: ICD-10-CM

## 2024-12-04 DIAGNOSIS — R93.7 ABNORMAL MRI, LUMBAR SPINE: ICD-10-CM

## 2024-12-04 DIAGNOSIS — D35.2 PITUITARY ADENOMA: ICD-10-CM

## 2024-12-04 PROCEDURE — 99285 EMERGENCY DEPT VISIT HI MDM: CPT

## 2024-12-04 RX ORDER — SODIUM CHLORIDE 0.9 % (FLUSH) 0.9 %
10 SYRINGE (ML) INJECTION AS NEEDED
Status: DISCONTINUED | OUTPATIENT
Start: 2024-12-04 | End: 2024-12-06 | Stop reason: HOSPADM

## 2024-12-04 NOTE — TELEPHONE ENCOUNTER
CALLER STATES PT IS HAVING TINGLING AND NUMBNESS IN LEGS. REFUSED ED. PLEASE CALL [phone] WITH INSTRUCTION.

## 2024-12-05 PROBLEM — R93.7 ABNORMAL MRI, LUMBAR SPINE: Status: ACTIVE | Noted: 2024-12-05

## 2024-12-05 PROBLEM — M54.10 RADICULAR LEG PAIN: Status: ACTIVE | Noted: 2024-12-05

## 2024-12-05 PROBLEM — M54.9 BACK PAIN: Status: ACTIVE | Noted: 2024-12-05

## 2024-12-05 LAB
ANION GAP SERPL CALCULATED.3IONS-SCNC: 14.9 MMOL/L (ref 5–15)
BASOPHILS # BLD AUTO: 0.07 10*3/MM3 (ref 0–0.2)
BASOPHILS NFR BLD AUTO: 0.5 % (ref 0–1.5)
BILIRUB UR QL STRIP: NEGATIVE
BUN SERPL-MCNC: 8 MG/DL (ref 6–20)
BUN/CREAT SERPL: 7.5 (ref 7–25)
CALCIUM SPEC-SCNC: 10.3 MG/DL (ref 8.6–10.5)
CHLORIDE SERPL-SCNC: 100 MMOL/L (ref 98–107)
CLARITY UR: CLEAR
CO2 SERPL-SCNC: 23.1 MMOL/L (ref 22–29)
COLOR UR: YELLOW
CREAT SERPL-MCNC: 1.07 MG/DL (ref 0.57–1)
DEPRECATED RDW RBC AUTO: 39.8 FL (ref 37–54)
EGFRCR SERPLBLD CKD-EPI 2021: 69.2 ML/MIN/1.73
EOSINOPHIL # BLD AUTO: 0.12 10*3/MM3 (ref 0–0.4)
EOSINOPHIL NFR BLD AUTO: 0.9 % (ref 0.3–6.2)
ERYTHROCYTE [DISTWIDTH] IN BLOOD BY AUTOMATED COUNT: 12.2 % (ref 12.3–15.4)
GLUCOSE SERPL-MCNC: 147 MG/DL (ref 65–99)
GLUCOSE UR STRIP-MCNC: NEGATIVE MG/DL
HCT VFR BLD AUTO: 43.2 % (ref 34–46.6)
HGB BLD-MCNC: 14.4 G/DL (ref 12–15.9)
HGB UR QL STRIP.AUTO: NEGATIVE
IMM GRANULOCYTES # BLD AUTO: 0.04 10*3/MM3 (ref 0–0.05)
IMM GRANULOCYTES NFR BLD AUTO: 0.3 % (ref 0–0.5)
KETONES UR QL STRIP: NEGATIVE
LEUKOCYTE ESTERASE UR QL STRIP.AUTO: NEGATIVE
LYMPHOCYTES # BLD AUTO: 4.84 10*3/MM3 (ref 0.7–3.1)
LYMPHOCYTES NFR BLD AUTO: 35.7 % (ref 19.6–45.3)
MCH RBC QN AUTO: 29.8 PG (ref 26.6–33)
MCHC RBC AUTO-ENTMCNC: 33.3 G/DL (ref 31.5–35.7)
MCV RBC AUTO: 89.4 FL (ref 79–97)
MONOCYTES # BLD AUTO: 0.79 10*3/MM3 (ref 0.1–0.9)
MONOCYTES NFR BLD AUTO: 5.8 % (ref 5–12)
NEUTROPHILS NFR BLD AUTO: 56.8 % (ref 42.7–76)
NEUTROPHILS NFR BLD AUTO: 7.69 10*3/MM3 (ref 1.7–7)
NITRITE UR QL STRIP: NEGATIVE
NRBC BLD AUTO-RTO: 0 /100 WBC (ref 0–0.2)
PH UR STRIP.AUTO: 6.5 [PH] (ref 5–8)
PLATELET # BLD AUTO: 312 10*3/MM3 (ref 140–450)
PMV BLD AUTO: 9.8 FL (ref 6–12)
POTASSIUM SERPL-SCNC: 4 MMOL/L (ref 3.5–5.2)
PROT UR QL STRIP: NEGATIVE
RBC # BLD AUTO: 4.83 10*6/MM3 (ref 3.77–5.28)
SODIUM SERPL-SCNC: 138 MMOL/L (ref 136–145)
SP GR UR STRIP: <=1.005 (ref 1–1.03)
UROBILINOGEN UR QL STRIP: NORMAL
WBC NRBC COR # BLD AUTO: 13.55 10*3/MM3 (ref 3.4–10.8)

## 2024-12-05 PROCEDURE — 25010000002 MORPHINE PER 10 MG: Performed by: EMERGENCY MEDICINE

## 2024-12-05 PROCEDURE — 25010000002 MORPHINE PER 10 MG: Performed by: NURSE PRACTITIONER

## 2024-12-05 PROCEDURE — 99213 OFFICE O/P EST LOW 20 MIN: CPT

## 2024-12-05 PROCEDURE — 25810000003 SODIUM CHLORIDE 0.9 % SOLUTION: Performed by: STUDENT IN AN ORGANIZED HEALTH CARE EDUCATION/TRAINING PROGRAM

## 2024-12-05 PROCEDURE — 81003 URINALYSIS AUTO W/O SCOPE: CPT | Performed by: PHYSICIAN ASSISTANT

## 2024-12-05 PROCEDURE — 80048 BASIC METABOLIC PNL TOTAL CA: CPT | Performed by: NURSE PRACTITIONER

## 2024-12-05 PROCEDURE — 85025 COMPLETE CBC W/AUTO DIFF WBC: CPT | Performed by: NURSE PRACTITIONER

## 2024-12-05 RX ORDER — METHOCARBAMOL 500 MG/1
500 TABLET, FILM COATED ORAL EVERY 8 HOURS PRN
Status: DISCONTINUED | OUTPATIENT
Start: 2024-12-05 | End: 2024-12-05

## 2024-12-05 RX ORDER — SODIUM CHLORIDE 0.9 % (FLUSH) 0.9 %
10 SYRINGE (ML) INJECTION AS NEEDED
Status: DISCONTINUED | OUTPATIENT
Start: 2024-12-05 | End: 2024-12-06 | Stop reason: HOSPADM

## 2024-12-05 RX ORDER — POLYETHYLENE GLYCOL 3350 17 G/17G
17 POWDER, FOR SOLUTION ORAL DAILY PRN
Status: DISCONTINUED | OUTPATIENT
Start: 2024-12-05 | End: 2024-12-06 | Stop reason: HOSPADM

## 2024-12-05 RX ORDER — ALPRAZOLAM 0.5 MG
0.5 TABLET ORAL 2 TIMES DAILY PRN
Status: DISCONTINUED | OUTPATIENT
Start: 2024-12-05 | End: 2024-12-06 | Stop reason: HOSPADM

## 2024-12-05 RX ORDER — ACETAMINOPHEN 500 MG
1000 TABLET ORAL EVERY 8 HOURS
Status: DISCONTINUED | OUTPATIENT
Start: 2024-12-05 | End: 2024-12-06 | Stop reason: HOSPADM

## 2024-12-05 RX ORDER — SODIUM CHLORIDE 9 MG/ML
40 INJECTION, SOLUTION INTRAVENOUS AS NEEDED
Status: DISCONTINUED | OUTPATIENT
Start: 2024-12-05 | End: 2024-12-06 | Stop reason: HOSPADM

## 2024-12-05 RX ORDER — CYCLOBENZAPRINE HCL 10 MG
10 TABLET ORAL NIGHTLY
Status: DISCONTINUED | OUTPATIENT
Start: 2024-12-05 | End: 2024-12-06 | Stop reason: HOSPADM

## 2024-12-05 RX ORDER — OXYCODONE HYDROCHLORIDE 5 MG/1
5 TABLET ORAL EVERY 6 HOURS PRN
Status: DISCONTINUED | OUTPATIENT
Start: 2024-12-05 | End: 2024-12-05

## 2024-12-05 RX ORDER — SERTRALINE HYDROCHLORIDE 100 MG/1
100 TABLET, FILM COATED ORAL DAILY
Status: DISCONTINUED | OUTPATIENT
Start: 2024-12-05 | End: 2024-12-06 | Stop reason: HOSPADM

## 2024-12-05 RX ORDER — SODIUM CHLORIDE 9 MG/ML
125 INJECTION, SOLUTION INTRAVENOUS CONTINUOUS
Status: DISPENSED | OUTPATIENT
Start: 2024-12-05 | End: 2024-12-06

## 2024-12-05 RX ORDER — SODIUM CHLORIDE 0.9 % (FLUSH) 0.9 %
10 SYRINGE (ML) INJECTION EVERY 12 HOURS SCHEDULED
Status: DISCONTINUED | OUTPATIENT
Start: 2024-12-05 | End: 2024-12-06 | Stop reason: HOSPADM

## 2024-12-05 RX ORDER — TRAZODONE HYDROCHLORIDE 50 MG/1
50 TABLET, FILM COATED ORAL NIGHTLY
Status: DISCONTINUED | OUTPATIENT
Start: 2024-12-05 | End: 2024-12-06 | Stop reason: HOSPADM

## 2024-12-05 RX ORDER — NALOXONE HCL 0.4 MG/ML
0.4 VIAL (ML) INJECTION
Status: DISCONTINUED | OUTPATIENT
Start: 2024-12-05 | End: 2024-12-06 | Stop reason: HOSPADM

## 2024-12-05 RX ORDER — ATORVASTATIN CALCIUM 40 MG/1
80 TABLET, FILM COATED ORAL DAILY
Status: DISCONTINUED | OUTPATIENT
Start: 2024-12-05 | End: 2024-12-06 | Stop reason: HOSPADM

## 2024-12-05 RX ORDER — PANTOPRAZOLE SODIUM 40 MG/1
40 TABLET, DELAYED RELEASE ORAL DAILY
Status: DISCONTINUED | OUTPATIENT
Start: 2024-12-05 | End: 2024-12-06 | Stop reason: HOSPADM

## 2024-12-05 RX ORDER — BISACODYL 10 MG
10 SUPPOSITORY, RECTAL RECTAL DAILY PRN
Status: DISCONTINUED | OUTPATIENT
Start: 2024-12-05 | End: 2024-12-06 | Stop reason: HOSPADM

## 2024-12-05 RX ORDER — ONDANSETRON 2 MG/ML
4 INJECTION INTRAMUSCULAR; INTRAVENOUS EVERY 6 HOURS PRN
Status: DISCONTINUED | OUTPATIENT
Start: 2024-12-05 | End: 2024-12-06 | Stop reason: HOSPADM

## 2024-12-05 RX ORDER — BISACODYL 5 MG/1
5 TABLET, DELAYED RELEASE ORAL DAILY PRN
Status: DISCONTINUED | OUTPATIENT
Start: 2024-12-05 | End: 2024-12-06 | Stop reason: HOSPADM

## 2024-12-05 RX ORDER — ASPIRIN 81 MG/1
81 TABLET, CHEWABLE ORAL DAILY
Status: DISCONTINUED | OUTPATIENT
Start: 2024-12-05 | End: 2024-12-06 | Stop reason: HOSPADM

## 2024-12-05 RX ORDER — OXYCODONE HYDROCHLORIDE 5 MG/1
5 TABLET ORAL EVERY 8 HOURS
Status: DISCONTINUED | OUTPATIENT
Start: 2024-12-06 | End: 2024-12-06 | Stop reason: HOSPADM

## 2024-12-05 RX ORDER — AMOXICILLIN 250 MG
2 CAPSULE ORAL 2 TIMES DAILY
Status: DISCONTINUED | OUTPATIENT
Start: 2024-12-05 | End: 2024-12-06 | Stop reason: HOSPADM

## 2024-12-05 RX ADMIN — ATORVASTATIN CALCIUM 80 MG: 40 TABLET, FILM COATED ORAL at 09:48

## 2024-12-05 RX ADMIN — SENNOSIDES AND DOCUSATE SODIUM 2 TABLET: 50; 8.6 TABLET ORAL at 20:15

## 2024-12-05 RX ADMIN — Medication 10 ML: at 20:16

## 2024-12-05 RX ADMIN — OXYCODONE 5 MG: 5 TABLET ORAL at 11:43

## 2024-12-05 RX ADMIN — ACETAMINOPHEN 1000 MG: 500 TABLET, FILM COATED ORAL at 20:14

## 2024-12-05 RX ADMIN — MORPHINE SULFATE 4 MG: 4 INJECTION, SOLUTION INTRAMUSCULAR; INTRAVENOUS at 07:53

## 2024-12-05 RX ADMIN — Medication 10 ML: at 09:49

## 2024-12-05 RX ADMIN — TRAZODONE HYDROCHLORIDE 50 MG: 50 TABLET ORAL at 20:16

## 2024-12-05 RX ADMIN — MORPHINE SULFATE 4 MG: 4 INJECTION, SOLUTION INTRAMUSCULAR; INTRAVENOUS at 00:34

## 2024-12-05 RX ADMIN — PANTOPRAZOLE SODIUM 40 MG: 40 TABLET, DELAYED RELEASE ORAL at 09:48

## 2024-12-05 RX ADMIN — OXYCODONE 5 MG: 5 TABLET ORAL at 18:35

## 2024-12-05 RX ADMIN — SODIUM CHLORIDE 125 ML/HR: 9 INJECTION, SOLUTION INTRAVENOUS at 23:28

## 2024-12-05 RX ADMIN — MORPHINE SULFATE 4 MG: 4 INJECTION, SOLUTION INTRAMUSCULAR; INTRAVENOUS at 21:22

## 2024-12-05 RX ADMIN — MORPHINE SULFATE 4 MG: 4 INJECTION, SOLUTION INTRAMUSCULAR; INTRAVENOUS at 14:46

## 2024-12-05 RX ADMIN — ASPIRIN 81 MG CHEWABLE TABLET 81 MG: 81 TABLET CHEWABLE at 09:48

## 2024-12-05 RX ADMIN — LAMOTRIGINE 150 MG: 100 TABLET ORAL at 20:14

## 2024-12-05 RX ADMIN — SERTRALINE HYDROCHLORIDE 100 MG: 100 TABLET, FILM COATED ORAL at 09:48

## 2024-12-05 RX ADMIN — SENNOSIDES AND DOCUSATE SODIUM 2 TABLET: 50; 8.6 TABLET ORAL at 09:48

## 2024-12-05 RX ADMIN — LAMOTRIGINE 150 MG: 100 TABLET ORAL at 09:48

## 2024-12-05 RX ADMIN — CYCLOBENZAPRINE 10 MG: 10 TABLET, FILM COATED ORAL at 20:15

## 2024-12-05 NOTE — CONSULTS
[facility] NEUROSURGERY CONSULT NOTE    Patient name: Elyssa Olivarez  Referring Provider:     Terese Franklin APRN       Reason for Consultation:     back pain, abnormal mri       Patient Care Team:  Vimal Moseley PA-C as PCP - General (Physician Assistant)  Christal Alvarez RN as Ambulatory  ([facility])    Chief complaint: Low back pain    Subjective .     History of present illness:    Patient is a 38 y.o. female who presents to [facility] after experiencing low back pain that radiates into her left leg.  Patient and her mother state that she has been experiencing her low back pain with radiating symptoms since this summer.  He was sent to multiple orthopedic surgeons who told her that she had sciatica and gave her some pain medication.  She finally got an MRI on 11/27 of this year.  Patient states that her pain radiates down the posterior aspect of her left leg.  She states that it goes into her foot both anterior and posteriorly.  She denies any symptoms in her right leg.  Mother and patient also stated that she has a history of hip dysplasia that she was diagnosed as a young child in addition to having a brain tumor back in 2010.  They do not remember the kind of tumor that she had, but states that she was given medication.  They do not recall if she ever saw an oncologist and they do not monitor the neurosurgeon.  She denies any abdominal pain or hematochezia.  Mother says that patient has been experiencing some chest pain for the last month, did not mention it to the PCP.  Mother mentions that there is a history of cancer in her family.  Patient denies any bowel or bladder incontinence or saddle anesthesia at this time.    Review of Systems  Review of Systems   Constitutional:  Positive for activity change.   Musculoskeletal:  Positive for back pain.   Neurological:  Positive for numbness.       History  PAST MEDICAL HISTORY  Past Medical History:   Diagnosis Date     Allergic     Ankle pain, chronic     right    Autism disorder     Depression     Goiter     Hearing loss     Hip dysplasia, congenital     Left    Hyperglycemia     Hyperlipidemia     Migraine headache     Mood disorder     Obesity     Partial hearing loss     Pupil dilation     right    Right foot pain     Chronic       PAST SURGICAL HISTORY  Past Surgical History:   Procedure Laterality Date    ANKLE SURGERY Bilateral     GALLBLADDER SURGERY      HYSTERECTOMY      TYMPANOPLASTY         FAMILY HISTORY  Family History   Problem Relation Age of Onset    Congenital heart disease Mother     Atrial fibrillation Mother     Heart disease Father     Heart disease Maternal Uncle     Heart disease Maternal Grandmother     Heart disease Paternal Grandmother        SOCIAL HISTORY  Social History     Tobacco Use    Smoking status: Never     Passive exposure: Never    Smokeless tobacco: Never   Vaping Use    Vaping status: Never Used    Passive vaping exposure: Yes (occ)   Substance Use Topics    Alcohol use: No    Drug use: No       Allergies:  Orange concentrate [flavoring agent]    MEDICATIONS:  Medications Prior to Admission   Medication Sig Dispense Refill Last Dose/Taking    aspirin 81 MG chewable tablet Chew 1 tablet Daily.   Taking    atorvastatin (LIPITOR) 80 MG tablet Take 1 tablet by mouth Daily. 90 tablet 1 Taking    clindamycin-benzoyl peroxide (BENZACLIN) 1-5 % gel Apply  topically to the appropriate area as directed 2 (Two) Times a Day. 50 g 5 Taking    cyclobenzaprine (FLEXERIL) 10 MG tablet Take 1 tablet by mouth Every Night. 30 tablet 5 Taking    ezetimibe (Zetia) 10 MG tablet Take 1 tablet by mouth Daily. For cholesterol 90 tablet 1 Taking    HYDROcodone-acetaminophen (NORCO) 5-325 MG per tablet Take 1 tablet by mouth Every 6 (Six) Hours As Needed for Moderate Pain. 21 tablet 0 Taking As Needed    lamoTRIgine (LaMICtal) 150 MG tablet Take 1 tablet by mouth 2 (Two) Times a Day for 360 days. 180 tablet 1 Taking     LORazepam (ATIVAN) 1 MG tablet Take 1 tablet by mouth Every 8 (Eight) Hours As Needed for Anxiety. 30 tablet 3 Taking As Needed    meloxicam (MOBIC) 15 MG tablet Take 1 tablet by mouth Daily. 1 PO Daily with food. 90 tablet 1 Taking    pantoprazole (PROTONIX) 40 MG EC tablet Take 1 tablet by mouth Daily. 90 tablet 1 Taking    sertraline (ZOLOFT) 100 MG tablet Take 1 tablet by mouth Daily. with food. 90 tablet 1 Taking    traZODone (DESYREL) 50 MG tablet Take 1 tablet by mouth Every Night. 90 tablet 3 Taking    Misc. Devices (Rollator Ultra-Light) misc Use 1 Units Daily. 1 each 0          Current Facility-Administered Medications:     ALPRAZolam (XANAX) tablet 0.5 mg, 0.5 mg, Oral, BID PRN, Monika Larios PA-C    aspirin chewable tablet 81 mg, 81 mg, Oral, Daily, Monika Larios PA-C    atorvastatin (LIPITOR) tablet 80 mg, 80 mg, Oral, Daily, Monika Larios PA-C    sennosides-docusate (PERICOLACE) 8.6-50 MG per tablet 2 tablet, 2 tablet, Oral, BID **AND** polyethylene glycol (MIRALAX) packet 17 g, 17 g, Oral, Daily PRN **AND** bisacodyl (DULCOLAX) EC tablet 5 mg, 5 mg, Oral, Daily PRN **AND** bisacodyl (DULCOLAX) suppository 10 mg, 10 mg, Rectal, Daily PRN, Delmar Acuna MD    cyclobenzaprine (FLEXERIL) tablet 10 mg, 10 mg, Oral, Nightly, Monika Larios PA-C    lamoTRIgine (LaMICtal) tablet 150 mg, 150 mg, Oral, BID, Monika Larios PA-C    melatonin tablet 5 mg, 5 mg, Oral, Nightly PRN, Delmar Acuna MD    methocarbamol (ROBAXIN) tablet 500 mg, 500 mg, Oral, Q8H PRN, Delmar Acuna MD    morphine injection 4 mg, 4 mg, Intravenous, Q6H PRN, 4 mg at 12/05/24 0753 **AND** naloxone (NARCAN) injection 0.4 mg, 0.4 mg, Intravenous, Q5 Min PRN, Delmar Acuna MD    ondansetron (ZOFRAN) injection 4 mg, 4 mg, Intravenous, Q6H PRN, Delmar Acuna MD    oxyCODONE (ROXICODONE) immediate release tablet 5 mg, 5 mg, Oral, Q6H PRN, Monika Larios PA-C    pantoprazole (PROTONIX) EC tablet 40 mg, 40 mg, Oral,  Daily, Monika Larios PA-C    sertraline (ZOLOFT) tablet 100 mg, 100 mg, Oral, Daily, Monika Larios PA-C    [COMPLETED] Insert Peripheral IV, , , Once **AND** sodium chloride 0.9 % flush 10 mL, 10 mL, Intravenous, PRN, Terese Franklin APRN    sodium chloride 0.9 % flush 10 mL, 10 mL, Intravenous, Q12H, Delmar Acuna MD    sodium chloride 0.9 % flush 10 mL, 10 mL, Intravenous, PRN, Delmar Acuna MD    sodium chloride 0.9 % infusion 40 mL, 40 mL, Intravenous, PRN, Delmar Acuna MD    traZODone (DESYREL) tablet 50 mg, 50 mg, Oral, Nightly, Monika Larios PA-C      Objective     Results Review:  LABS:  Results from last 7 days   Lab Units 12/05/24  0025   WBC 10*3/mm3 13.55*   HEMOGLOBIN g/dL 14.4   HEMATOCRIT % 43.2   PLATELETS 10*3/mm3 312     Results from last 7 days   Lab Units 12/05/24  0025   SODIUM mmol/L 138   POTASSIUM mmol/L 4.0   CHLORIDE mmol/L 100   CO2 mmol/L 23.1   BUN mg/dL 8   CREATININE mg/dL 1.07*   CALCIUM mg/dL 10.3   GLUCOSE mg/dL 147*         DIAGNOSTICS:  Patient had an MRI of her lumbar spine performed at [facility] that shows 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  No high-grade canal stenosis or neuroforaminal stenosis appreciated.    Results Review:   I reviewed the patient's new clinical results.  I personally viewed patient's chart and imaging    Vital Signs   Temp:  [98.1 °F (36.7 °C)-98.9 °F (37.2 °C)] 98.1 °F (36.7 °C)  Heart Rate:  [] 87  Resp:  [16-19] 18  BP: (111-137)/(65-88) 111/65    Physical Exam:  Physical Exam  Vitals reviewed.   Eyes:      Extraocular Movements: Extraocular movements intact.      Conjunctiva/sclera: Conjunctivae normal.      Pupils: Pupils are equal, round, and reactive to light.   Musculoskeletal:         General: Normal range of motion.      Cervical back: Normal range of motion.   Skin:     General: Skin is warm and dry.   Neurological:      Mental Status: Mental status is at baseline.      Comments: Has autism, high  functioning   Psychiatric:         Mood and Affect: Mood normal.         Speech: Speech normal.       Neurological Exam  Mental Status  Awake, alert and oriented to person, place and time. Oriented to person, place and time. Speech is normal. Language is fluent with no aphasia.    Cranial Nerves  CN III, IV, VI: Extraocular movements intact bilaterally. Pupils equal round and reactive to light bilaterally.    Motor  Normal muscle bulk throughout.                                               Right                     Left   Anterior tibialis                      5                          5   Posterior tibialis                    5                          5  Patient had difficult time lifting up her legs as it causes severe back pain.  Otherwise she had good strength in her feet, had good sensation in her right leg but diminished in her left..      Assessment & Plan       Back pain      Problem List Items Addressed This Visit          Musculoskeletal and Injuries    * (Principal) Back pain - Primary     Other Visit Diagnoses       Abnormal MRI                 COMORBID CONDITIONS:  Obesity, hyperlipidemia, hyperglycemia, hip dysplasia    Patient is a 38-year-old female who presents to [facility] after experiencing worsening low back pain.  Patient states that this has been going on since the summer and has seen multiple orthopedic surgeons and finally had an MRI performed in November this year.    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  I do not appreciate any severe canal stenosis or neuroforaminal stenosis on imaging.  Patient is stating that she has low back pain with rating symptoms into her left leg that mimic the S1 dermatomal pattern.    On physical exam patient does have good strength.  Due to the pain that it caused in her low back patient was unable to fully lift up her legs.  I do not appreciate any clonus.  She does have full sensation in her right leg, but  "states it has diminished in her left leg.    At this time due to this mass being found in her spine in addition to her history of brain tumor I went ahead and ordered an MRI with and without contrast of her cervical, thoracic and brain.  I went ahead and also ordered an MRI lumbar just with contrast and she already had an MRI without contrast.  Patient is claustrophobic so I recommend giving her medication to help with her anxiety.    Once the images are back we will go ahead and follow through with the proper treatment plan.  Patient and mother are agreeable to this.    Please reach out any questions or concerns.      PLAN:   Intramedullary mass  -MRI with and without contrast of cervical, thoracic and brain  -MRI with contrast of lumbar spine  -Recommend pain regimen per primary  -Treatment plan to follow imaging results    I discussed the patient's findings and my recommendations with patient, family, and Dr. Rodriguez    During patient visit, I utilized appropriate personal protective equipment including gloves and mask.  Mask used was standard procedure mask. Appropriate PPE was worn during the entire visit.  Hand hygiene was completed before and after.     Alesia Hodge PA-C  12/05/24  08:54 EST    \"Dictated utilizing Dragon dictation\".    "

## 2024-12-05 NOTE — ED PROVIDER NOTES
Subjective   Chief Complaint   Patient presents with    Back Pain       History of Present Illness  38-year-old female presents the emergency department for evaluation of back pain.  Patient has been having low back pain, she was seen and evaluated by her primary care provider, they referred her to have an MRI on 11/27/2024 which found to be abnormal.  Patient is due to have a neurosurgery appointment on Friday, however tonight she had worsening pain with associated tingling in the left leg which is new.    Denies weakness, no urinary retention, bowel or bladder incontinence.    No new trauma or falls.   Review of Systems    Past Medical History:   Diagnosis Date    Allergic     Ankle pain, chronic     right    Autism disorder     Depression     Goiter     Hearing loss     Hip dysplasia, congenital     Left    Hyperglycemia     Hyperlipidemia     Migraine headache     Mood disorder     Obesity     Partial hearing loss     Pupil dilation     right    Right foot pain     Chronic       Allergies   Allergen Reactions    Adel Concentrate [Flavoring Agent] Hives     Orange ibuprofen       Past Surgical History:   Procedure Laterality Date    ANKLE SURGERY Bilateral     GALLBLADDER SURGERY      HYSTERECTOMY      TYMPANOPLASTY         Family History   Problem Relation Age of Onset    Congenital heart disease Mother     Atrial fibrillation Mother     Heart disease Father     Heart disease Maternal Uncle     Heart disease Maternal Grandmother     Heart disease Paternal Grandmother        Social History     Socioeconomic History    Marital status: Single   Tobacco Use    Smoking status: Never     Passive exposure: Never    Smokeless tobacco: Never   Vaping Use    Vaping status: Never Used    Passive vaping exposure: Yes (occ)   Substance and Sexual Activity    Alcohol use: No    Drug use: No    Sexual activity: Defer           Objective   Physical Exam  Vitals and nursing note reviewed.   Constitutional:       Appearance:  Normal appearance.   HENT:      Head: Normocephalic and atraumatic.   Eyes:      Extraocular Movements: Extraocular movements intact.      Conjunctiva/sclera: Conjunctivae normal.      Pupils: Pupils are equal, round, and reactive to light.   Cardiovascular:      Rate and Rhythm: Normal rate and regular rhythm.   Musculoskeletal:      Cervical back: Normal range of motion and neck supple.      Comments: No vertebral point tenderness noted to the C-spine T-spine or L-spine.  Diffuse tenderness to lumbar spine.   No skin abnormalities are appreciated.  No saddle anesthesia. No extremity weakness noted. Sensation intact to light touch.    Skin:     General: Skin is warm and dry.      Capillary Refill: Capillary refill takes less than 2 seconds.      Findings: No rash.   Neurological:      General: No focal deficit present.      Mental Status: She is alert and oriented to person, place, and time.      Cranial Nerves: No dysarthria or facial asymmetry.      Sensory: Sensation is intact.      Motor: No weakness.         Procedures           ED Course                                                       Medical Decision Making  Discussed with Dr. Acuna.  Chart Review: Office visit 11/21/2024 for left leg and hip pain.  Note incomplete at this time  9/6/2024 bilateral hip pain radiating down the legs.    [facility] MRI of the lumbar spine completed November 27, 2024  Impression:   Soft tissues: Well-circumscribed intrathecal extramedullary nodule at the cephalad L5 level measuring 16 cm in CC dimension by 12 mm AP dimension by 12 mm transverse dimension.  Differential includes but limited to meningioma, schwannoma, drop metastasis.  Follow-up MRI with and without contrast recommended.  Additionally follow-up MRI cervical spine, thoracic spine recommended to further assess the remainder of the central nervous system.  There is a well circumscribed intrathecal extramedullary nodule at the cephalad L5 level as  discussed.  Follow-up brain and entire spine MRI with and without contrast recommended to further assess.  Degenerative changes L4-L5      Pt was Placed on appropriate monitoring. Patient presents to the ED for the above complaint, underwent the above, exam and workup.    The patient's MRI from [facility] as above, and is scanned in the patient's chart.    Has no signs of cauda equina at this time, I see no indication on exam for emergent neurosurgical consultation here in the ED tonight, however given the patient's new tingling in her left leg, her abnormal MRI, will place in ED observation and consult neurosurgery further recommendations on further imaging and management    Pt due to see Madie Pham with neurosurgery on Friday    Discussed my findings with the patient and her family at bedside, they agree and verbalized understanding.    Note Disclaimer: At [facility], we believe that sharing information builds trust and better relationships. You are receiving this note because you recently visited [facility]. It is possible you will see health information before a provider has talked with you about it. This kind of information can be easy to misunderstand. To help you fully understand what it means for your health, we urge you to discuss this note with your provider  Note dictated utilizing Dragon Dictation.  Appropriate PPE worn during patient interactions.      Final diagnoses:   Acute low back pain, unspecified back pain laterality, unspecified whether sciatica present   Abnormal MRI       ED Disposition  ED Disposition       ED Disposition   Decision to Admit    Condition   --    Comment   --               No follow-up provider specified.       Medication List      No changes were made to your prescriptions during this visit.            Terese Franklin, APRN  12/05/24 [id number]

## 2024-12-05 NOTE — H&P
[facility] Medicine Services  History & Physical    Patient Name: Elyssa Olivarez  : 1985  MRN: [id number]  Primary Care Physician:  Vimal Moseley PA-C  Date of admission: 2024  Date and Time of Service: 2024 at 1800    Subjective      Chief Complaint: Back pain    History of Present Illness: Elyssa Olivarez is a 38 y.o. female with a CMH of brain tumor, hip dysplasia who presented to [facility] on 2024 with  back pain. 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 She had a brain tumor in  that she received medication for, pt and family are unsure of specifics. DENG consulted by ED, planning for additional MRI scans. Due to expected delay in MRI availability decision was made to admit patient to have them done. Admitted to hospitalist team with DENG consulted.       Review of Systems   Constitutional:  Negative for activity change, appetite change, chills, diaphoresis and fever.   HENT:  Negative for congestion, sinus pressure, sinus pain and sore throat.    Eyes: Negative.    Respiratory:  Negative for cough, chest tightness, shortness of breath and wheezing.    Cardiovascular:  Negative for chest pain, palpitations and leg swelling.   Gastrointestinal:  Positive for constipation. Negative for abdominal distention, abdominal pain, diarrhea and nausea.   Genitourinary:  Negative for difficulty urinating, dysuria, frequency and urgency.   Musculoskeletal:  Positive for back pain and gait problem. Negative for neck pain and neck stiffness.   Skin: Negative.    Neurological:  Positive for weakness and numbness. Negative for dizziness, tremors, seizures, light-headedness and headaches.       Personal History     Past Medical History:   Diagnosis Date    Allergic     Ankle pain, chronic     right    Autism disorder     Depression     Goiter     Hearing loss     Hip  dysplasia, congenital     Left    Hyperglycemia     Hyperlipidemia     Migraine headache     Mood disorder     Obesity     Partial hearing loss     Pupil dilation     right    Right foot pain     Chronic       Past Surgical History:   Procedure Laterality Date    ANKLE SURGERY Bilateral     GALLBLADDER SURGERY      HYSTERECTOMY      TYMPANOPLASTY         Family History: family history includes Atrial fibrillation in her mother; Congenital heart disease in her mother; Heart disease in her father, maternal grandmother, maternal uncle, and paternal grandmother. Otherwise pertinent FHx was reviewed and not pertinent to current issue.    Social History:  reports that she has never smoked. She has never been exposed to tobacco smoke. She has never used smokeless tobacco. She reports that she does not drink alcohol and does not use drugs.    Home Medications:  Prior to Admission Medications       Prescriptions Last Dose Informant Patient Reported? Taking?    aspirin 81 MG chewable tablet   Yes Yes    Chew 1 tablet Daily.    atorvastatin (LIPITOR) 80 MG tablet   No Yes    Take 1 tablet by mouth Daily.    clindamycin-benzoyl peroxide (BENZACLIN) 1-5 % gel   No Yes    Apply  topically to the appropriate area as directed 2 (Two) Times a Day.    cyclobenzaprine (FLEXERIL) 10 MG tablet   No Yes    Take 1 tablet by mouth Every Night.    ezetimibe (Zetia) 10 MG tablet   No Yes    Take 1 tablet by mouth Daily. For cholesterol    HYDROcodone-acetaminophen (NORCO) 5-325 MG per tablet   No Yes    Take 1 tablet by mouth Every 6 (Six) Hours As Needed for Moderate Pain.    lamoTRIgine (LaMICtal) 150 MG tablet   No Yes    Take 1 tablet by mouth 2 (Two) Times a Day for 360 days.    LORazepam (ATIVAN) 1 MG tablet   No Yes    Take 1 tablet by mouth Every 8 (Eight) Hours As Needed for Anxiety.    meloxicam (MOBIC) 15 MG tablet   No Yes    Take 1 tablet by mouth Daily. 1 PO Daily with food.    Misc. Devices (Rollator Ultra-Light) misc   No No     Use 1 Units Daily.    pantoprazole (PROTONIX) 40 MG EC tablet   No Yes    Take 1 tablet by mouth Daily.    sertraline (ZOLOFT) 100 MG tablet   No Yes    Take 1 tablet by mouth Daily. with food.    traZODone (DESYREL) 50 MG tablet   No Yes    Take 1 tablet by mouth Every Night.              Allergies:  Allergies   Allergen Reactions    Hardy Concentrate [Flavoring Agent] Hives     Orange ibuprofen       Objective      Vitals:   Temp:  [97.7 °F (36.5 °C)-98.9 °F (37.2 °C)] 98.5 °F (36.9 °C)  Heart Rate:  [] 91  Resp:  [16-20] 18  BP: (108-137)/(65-89) 112/87  Body mass index is 42.02 kg/m².  Physical Exam  Constitutional:       General: She is not in acute distress.     Appearance: She is obese.   HENT:      Head: Normocephalic and atraumatic.      Mouth/Throat:      Mouth: Mucous membranes are moist.      Pharynx: Oropharynx is clear.   Eyes:      Extraocular Movements: Extraocular movements intact.      Conjunctiva/sclera: Conjunctivae normal.      Pupils: Pupils are equal, round, and reactive to light.   Cardiovascular:      Rate and Rhythm: Normal rate and regular rhythm.      Pulses: Normal pulses.      Heart sounds: Normal heart sounds.   Pulmonary:      Effort: Pulmonary effort is normal.      Breath sounds: Normal breath sounds.   Abdominal:      General: Abdomen is flat.      Palpations: Abdomen is soft.      Tenderness: There is no guarding or rebound.      Comments: Hypoactive bowel sounds     Musculoskeletal:      Cervical back: Normal range of motion and neck supple.      Right lower leg: No edema.      Left lower leg: No edema.      Comments: Stephan UE ROM intact. Stephan LE ROM limited 2/2 pain   Skin:     General: Skin is warm and dry.   Neurological:      Mental Status: She is alert and oriented to person, place, and time. Mental status is at baseline.      Cranial Nerves: No cranial nerve deficit.      Sensory: Sensory deficit present.      Comments: Str even/intact stephan UE  Stephan  plantar/dorsiflexion even/intact, rest of LE str exam limited d/t pain  Sensation intact RLE, diminished LLE   Psychiatric:         Mood and Affect: Mood normal.         Thought Content: Thought content normal.      Comments: Pt w/ autism         Diagnostic Data:  Lab Results (last 24 hours)       Procedure Component Value Units Date/Time    Urinalysis With Culture If Indicated - Urine, Clean Catch [[id number]]  (Normal) Collected: 12/05/24 1319    Specimen: Urine, Clean Catch Updated: 12/05/24 1329     Color, UA Yellow     Appearance, UA Clear     pH, UA 6.5     Specific Gravity, UA <=1.005     Glucose, UA Negative     Ketones, UA Negative     Bilirubin, UA Negative     Blood, UA Negative     Protein, UA Negative     Leuk Esterase, UA Negative     Nitrite, UA Negative     Urobilinogen, UA 0.2 E.U./dL    Narrative:      In absence of clinical symptoms, the presence of pyuria, bacteria, and/or nitrites on the urinalysis result does not correlate with infection.  Urine microscopic not indicated.    Basic Metabolic Panel [[id number]]  (Abnormal) Collected: 12/05/24 0025    Specimen: Blood Updated: 12/05/24 0059     Glucose 147 mg/dL      BUN 8 mg/dL      Creatinine 1.07 mg/dL      Sodium 138 mmol/L      Potassium 4.0 mmol/L      Comment: Specimen hemolyzed.  Result may be falsely elevated.        Chloride 100 mmol/L      CO2 23.1 mmol/L      Calcium 10.3 mg/dL      BUN/Creatinine Ratio 7.5     Anion Gap 14.9 mmol/L      eGFR 69.2 mL/min/1.73     Narrative:      GFR Normal >60  Chronic Kidney Disease <60  Kidney Failure <15      CBC & Differential [[id number]]  (Abnormal) Collected: 12/05/24 0025    Specimen: Blood Updated: 12/05/24 0031    Narrative:      The following orders were created for panel order CBC & Differential.  Procedure                               Abnormality         Status                     ---------                               -----------         ------                     CBC Auto  Differential[[id number]]        Abnormal            Final result                 Please view results for these tests on the individual orders.    CBC Auto Differential [[id number]]  (Abnormal) Collected: 12/05/24 0025    Specimen: Blood Updated: 12/05/24 0031     WBC 13.55 10*3/mm3      RBC 4.83 10*6/mm3      Hemoglobin 14.4 g/dL      Hematocrit 43.2 %      MCV 89.4 fL      MCH 29.8 pg      MCHC 33.3 g/dL      RDW 12.2 %      RDW-SD 39.8 fl      MPV 9.8 fL      Platelets 312 10*3/mm3      Neutrophil % 56.8 %      Lymphocyte % 35.7 %      Monocyte % 5.8 %      Eosinophil % 0.9 %      Basophil % 0.5 %      Immature Grans % 0.3 %      Neutrophils, Absolute 7.69 10*3/mm3      Lymphocytes, Absolute 4.84 10*3/mm3      Monocytes, Absolute 0.79 10*3/mm3      Eosinophils, Absolute 0.12 10*3/mm3      Basophils, Absolute 0.07 10*3/mm3      Immature Grans, Absolute 0.04 10*3/mm3      nRBC 0.0 /100 WBC              Imaging Results (Last 24 Hours)       ** No results found for the last 24 hours. **              Assessment & Plan        This is a 38 y.o. female with:    Active and Resolved Problems  Active Hospital Problems    Diagnosis  POA    **Back pain [M54.9]  Yes    Abnormal MRI, lumbar spine [R93.7]  Yes    Anxiety disorder [F41.9]  Yes    Depression [F32.A]  Yes      Resolved Hospital Problems   No resolved problems to display.       Back pain  Intramedullary mass  - Reported history of unspecified brain tumor 2010 treated with medication. Pt/family unsure about details about tumor/treatment  - DENG following, further plan pending additional MRI imaging  - Pain control with flexeril, acetaminophen 1000mg q8H, oxycodone 5mg q8H, morphine 4mg IV q6H PRN. Titrate as needed. Cont pulse ox     Leukocytosis  - WBC 13.5. HR intermittently elevated in the setting of pain. Pt w/o infectious symptoms, UA negative.  - Repeat CBC, monitor.     Hyperlipidemia  - cont home atorvastatin, holding zetia as not on  formulary    Anxiety  Depression  - Cont home sertraline  - pt on PRN ativan at home, reports using ~1-2x weekly. 0.5mg ativan BID PRN ordered    Seizures  - Hx absence seizures, well controlled  - cont home lamotrigine    Insomnia  - Melatonin  - cont home trazodone     GERD  - cont home protonix    VTE Prophylaxis:  Mechanical VTE prophylaxis orders are present.        The patient desires to be as follows:    CODE STATUS:    Level Of Support Discussed With: Patient  Code Status (Patient has no pulse and is not breathing): CPR (Attempt to Resuscitate)  Medical Interventions (Patient has pulse or is breathing): Full Support      Admission Status:  I believe this patient meets observation status.    Expected Length of Stay: <2 midnights    PDMP and Medication Dispenses via Sidebar reviewed and consistent with patient reported medications.    I discussed the patient's findings and my recommendations with patient.      Signature:     This document has been electronically signed by Richmond Redd MD on December 5, 2024 20:06 [facility]

## 2024-12-05 NOTE — CASE MANAGEMENT/SOCIAL WORK
Social Work Assessment   [facility]     Patient Name: Elyssa Olivarez  MRN: [id number]  Today's Date: 12/5/2024    Admit Date: 12/4/2024     Discharge Needs Assessment       Row Name 12/05/24 1706       Living Environment    People in Home parent(s)    Name(s) of People in Home Patient resides with her Mother (f/t paid caregiver) and stepfather    Current Living Arrangements home    Potentially Unsafe Housing Conditions none    In the past 12 months has the electric, gas, oil, or water company threatened to shut off services in your home? No    Primary Care Provided by parent(s)    Provides Primary Care For no one, unable/limited ability to care for self    Caregiving Concerns Mother is f/t paid caregiver through [facility] Waiver    Family Caregiver if Needed none    Quality of Family Relationships supportive    Able to Return to Prior Arrangements yes       Resource/Environmental Concerns    Resource/Environmental Concerns none    Transportation Concerns none       Transportation Needs    In the past 12 months, has lack of transportation kept you from medical appointments or from getting medications? no    In the past 12 months, has lack of transportation kept you from meetings, work, or from getting things needed for daily living? No       Food Insecurity    Within the past 12 months, you worried that your food would run out before you got the money to buy more. Never true    Within the past 12 months, the food you bought just didn't last and you didn't have money to get more. Never true       Transition Planning    Patient/Family Anticipates Transition to home with family    Patient/Family Anticipated Services at Transition none    Transportation Anticipated family or friend will provide       Discharge Needs Assessment    Equipment Currently Used at Home bath bench;cane, straight;commode;walker, standard;wheelchair;ramp;other (see comments)  Patient has hand rails in shower.    Concerns to be Addressed discharge  planning    Do you want help finding or keeping work or a job? I do not need or want help    Do you want help with school or training? For example, starting or completing job training or getting a high school diploma, GED or equivalent No    Equipment Needed After Discharge none                   Discharge Plan       Row Name 24 1712       Plan    Plan From home with Mother who is her F/T Paid Caregiver.  Stepfather also resides in the home.    Plan Comments SW reviewed chart and met with patient and her Mother at bedside.  Patient agreeable to have her Mother present during assessment.  Mother is F/T paid caregiver for patient.  She is paid 40 hours weekly through [employer] Waiver services.  Patient is able to get enough food, utilities paid, medications and has transportation.  She receives SSD from  father.  Patient does not smoke/vape, consume alcohol, or use illicit substances.  PCP and Pharmacy verified.  DME in home includes walker, w/c, BSC. handrails in shower area, can and a ramp.  Mother requesting a gait belt for home use.   Mother will provide transportation home at time of d/c.                  Continued Care and Services - Admitted Since 2024    No active coordination exists for this encounter.       Selected Continued Care - Episodes Includes continued care and service providers with selected services from the active episodes listed below      Chronic Care Management Episode start date: 2024 (Paused)   There are no active outsourced providers for this episode.                 Expected Discharge Date and Time       Expected Discharge Date Expected Discharge Time    Dec 8, 2024            Demographic Summary       Row Name 24 1707       General Information    Admission Type inpatient    Arrived From home    Referral Source admission list    Reason for Consult discharge planning    Preferred Language English       Contact Information    Permission Granted to Share Info With case  manager    Contact Information Obtained for                    Functional Status       Row Name 12/05/24 7581       Functional Status    Usual Activity Tolerance fair    Current Activity Tolerance fair       Physical Activity    On average, how many days per week do you engage in moderate to strenuous exercise (like a brisk walk)? 0 days    On average, how many minutes do you engage in exercise at this level? 0 min    Number of minutes of exercise per week 0       Assessment of Health Literacy    How often do you have someone help you read hospital materials? Always    How often do you have problems learning about your medical condition because of difficulty understanding written information? Always    How often do you have a problem understanding what is told to you about your medical condition? Always    How confident are you filling out medical forms by yourself? A little bit    Health Literacy Fair       Functional Status, IADL    Medications assistive person    Meal Preparation assistive person    Housekeeping assistive person    Laundry assistive person    Shopping assistive person    If for any reason you need help with day-to-day activities such as bathing, preparing meals, shopping, managing finances, etc., do you get the help you need? I get all the help I need    IADL Comments Mother is patient's caregiver paid for 40 hours a week through Medicaid Waiver.       Mental Status    General Appearance WDL WDL       Employment/    Employment Status disabled    Current or Previous Occupation not applicable    Employment/ Comments Patient receives disability.  Patient is autistic.               ZAC Tapia MSW    Phone: [phone]  Cell: [phone]  Fax: [phone]  [email or web address]

## 2024-12-05 NOTE — PLAN OF CARE
Problem: Adult Inpatient Plan of Care  Goal: Plan of Care Review  Outcome: Progressing  Goal: Patient-Specific Goal (Individualized)  Outcome: Progressing  Goal: Absence of Hospital-Acquired Illness or Injury  Outcome: Progressing  Intervention: Identify and Manage Fall Risk  Recent Flowsheet Documentation  Taken 12/5/2024 0400 by Lorena Heath RN  Safety Promotion/Fall Prevention: safety round/check completed  Taken 12/5/2024 0200 by Lorena Heath RN  Safety Promotion/Fall Prevention:   safety round/check completed   room organization consistent   nonskid shoes/slippers when out of bed   clutter free environment maintained   assistive device/personal items within reach  Intervention: Prevent Skin Injury  Recent Flowsheet Documentation  Taken 12/5/2024 0200 by Lorena Heath RN  Body Position: position changed independently  Goal: Optimal Comfort and Wellbeing  Outcome: Progressing  Intervention: Provide Person-Centered Care  Recent Flowsheet Documentation  Taken 12/5/2024 0200 by Lorena Heath RN  Trust Relationship/Rapport:   care explained   choices provided   questions answered   questions encouraged   thoughts/feelings acknowledged  Goal: Readiness for Transition of Care  Outcome: Progressing  Intervention: Mutually Develop Transition Plan  Recent Flowsheet Documentation  Taken 12/5/2024 0143 by Lorena Heath RN  Transportation Anticipated: family or friend will provide  Patient/Family Anticipated Services at Transition: none  Patient/Family Anticipates Transition to: home with family  Taken 12/5/2024 0138 by Lorena Heath RN  Equipment Currently Used at Home:   wheelchair   cane, straight   commode   shower chair     Problem: Skin Injury Risk Increased  Goal: Skin Health and Integrity  Outcome: Progressing  Intervention: Optimize Skin Protection  Recent Flowsheet Documentation  Taken 12/5/2024 0200 by Lorena Heath RN  Activity Management:   up ad michele    activity encouraged   up to bedside commode   ambulated in room  Head of Bed (HOB) Positioning: HOB at 30-45 degrees     Problem: Pain Acute  Goal: Optimal Pain Control and Function  Outcome: Progressing  Intervention: Optimize Psychosocial Wellbeing  Recent Flowsheet Documentation  Taken 12/5/2024 0200 by Lorena Heath, RN  Diversional Activities:   smartphone   television  Intervention: Prevent or Manage Pain  Recent Flowsheet Documentation  Taken 12/5/2024 0200 by Lorena Heath, RN  Medication Review/Management: medications reviewed   Goal Outcome Evaluation:

## 2024-12-06 ENCOUNTER — TELEPHONE (OUTPATIENT)
Dept: NEUROSURGERY | Facility: CLINIC | Age: 39
End: 2024-12-06
Payer: MEDICARE

## 2024-12-06 ENCOUNTER — APPOINTMENT (OUTPATIENT)
Dept: MRI IMAGING | Facility: HOSPITAL | Age: 39
DRG: 565 | End: 2024-12-06
Payer: MEDICARE

## 2024-12-06 ENCOUNTER — READMISSION MANAGEMENT (OUTPATIENT)
Dept: CALL CENTER | Facility: HOSPITAL | Age: 39
End: 2024-12-06
Payer: MEDICARE

## 2024-12-06 VITALS
SYSTOLIC BLOOD PRESSURE: 123 MMHG | HEIGHT: 62 IN | HEART RATE: 97 BPM | TEMPERATURE: 97 F | RESPIRATION RATE: 16 BRPM | OXYGEN SATURATION: 96 % | DIASTOLIC BLOOD PRESSURE: 79 MMHG | WEIGHT: 229.72 LBS | BODY MASS INDEX: 42.27 KG/M2

## 2024-12-06 DIAGNOSIS — D35.2 PITUITARY ADENOMA: Primary | ICD-10-CM

## 2024-12-06 LAB
ANION GAP SERPL CALCULATED.3IONS-SCNC: 10.1 MMOL/L (ref 5–15)
BASOPHILS # BLD AUTO: 0.05 10*3/MM3 (ref 0–0.2)
BASOPHILS NFR BLD AUTO: 0.5 % (ref 0–1.5)
BUN SERPL-MCNC: 12 MG/DL (ref 6–20)
BUN/CREAT SERPL: 18.5 (ref 7–25)
CALCIUM SPEC-SCNC: 9.8 MG/DL (ref 8.6–10.5)
CHLORIDE SERPL-SCNC: 101 MMOL/L (ref 98–107)
CO2 SERPL-SCNC: 26.9 MMOL/L (ref 22–29)
CREAT SERPL-MCNC: 0.65 MG/DL (ref 0.57–1)
DEPRECATED RDW RBC AUTO: 39.9 FL (ref 37–54)
EGFRCR SERPLBLD CKD-EPI 2021: 117.2 ML/MIN/1.73
EOSINOPHIL # BLD AUTO: 0.13 10*3/MM3 (ref 0–0.4)
EOSINOPHIL NFR BLD AUTO: 1.3 % (ref 0.3–6.2)
ERYTHROCYTE [DISTWIDTH] IN BLOOD BY AUTOMATED COUNT: 12.2 % (ref 12.3–15.4)
FSH SERPL-ACNC: 3.81 MIU/ML
GLUCOSE SERPL-MCNC: 135 MG/DL (ref 65–99)
HCT VFR BLD AUTO: 41.4 % (ref 34–46.6)
HGB BLD-MCNC: 13.3 G/DL (ref 12–15.9)
IMM GRANULOCYTES # BLD AUTO: 0.02 10*3/MM3 (ref 0–0.05)
IMM GRANULOCYTES NFR BLD AUTO: 0.2 % (ref 0–0.5)
LH SERPL-ACNC: 3.53 MIU/ML
LYMPHOCYTES # BLD AUTO: 4.99 10*3/MM3 (ref 0.7–3.1)
LYMPHOCYTES NFR BLD AUTO: 49.5 % (ref 19.6–45.3)
MCH RBC QN AUTO: 28.7 PG (ref 26.6–33)
MCHC RBC AUTO-ENTMCNC: 32.1 G/DL (ref 31.5–35.7)
MCV RBC AUTO: 89.4 FL (ref 79–97)
MONOCYTES # BLD AUTO: 0.77 10*3/MM3 (ref 0.1–0.9)
MONOCYTES NFR BLD AUTO: 7.6 % (ref 5–12)
NEUTROPHILS NFR BLD AUTO: 4.12 10*3/MM3 (ref 1.7–7)
NEUTROPHILS NFR BLD AUTO: 40.9 % (ref 42.7–76)
NRBC BLD AUTO-RTO: 0 /100 WBC (ref 0–0.2)
PLATELET # BLD AUTO: 312 10*3/MM3 (ref 140–450)
PMV BLD AUTO: 9.7 FL (ref 6–12)
POTASSIUM SERPL-SCNC: 4.3 MMOL/L (ref 3.5–5.2)
RBC # BLD AUTO: 4.63 10*6/MM3 (ref 3.77–5.28)
SODIUM SERPL-SCNC: 138 MMOL/L (ref 136–145)
T4 FREE SERPL-MCNC: 1.26 NG/DL (ref 0.93–1.7)
TSH SERPL DL<=0.05 MIU/L-ACNC: 3.53 UIU/ML (ref 0.27–4.2)
WBC NRBC COR # BLD AUTO: 10.08 10*3/MM3 (ref 3.4–10.8)

## 2024-12-06 PROCEDURE — 83001 ASSAY OF GONADOTROPIN (FSH): CPT | Performed by: PHYSICIAN ASSISTANT

## 2024-12-06 PROCEDURE — 70553 MRI BRAIN STEM W/O & W/DYE: CPT

## 2024-12-06 PROCEDURE — 63710000001 PREDNISONE PER 1 MG

## 2024-12-06 PROCEDURE — A9579 GAD-BASE MR CONTRAST NOS,1ML: HCPCS | Performed by: STUDENT IN AN ORGANIZED HEALTH CARE EDUCATION/TRAINING PROGRAM

## 2024-12-06 PROCEDURE — 72157 MRI CHEST SPINE W/O & W/DYE: CPT

## 2024-12-06 PROCEDURE — 25810000003 SODIUM CHLORIDE 0.9 % SOLUTION: Performed by: STUDENT IN AN ORGANIZED HEALTH CARE EDUCATION/TRAINING PROGRAM

## 2024-12-06 PROCEDURE — 84439 ASSAY OF FREE THYROXINE: CPT | Performed by: PHYSICIAN ASSISTANT

## 2024-12-06 PROCEDURE — 72158 MRI LUMBAR SPINE W/O & W/DYE: CPT

## 2024-12-06 PROCEDURE — 72156 MRI NECK SPINE W/O & W/DYE: CPT

## 2024-12-06 PROCEDURE — 84443 ASSAY THYROID STIM HORMONE: CPT | Performed by: PHYSICIAN ASSISTANT

## 2024-12-06 PROCEDURE — 63710000001 PREDNISONE PER 5 MG

## 2024-12-06 PROCEDURE — 25010000002 GADOTERIDOL PER 1 ML: Performed by: STUDENT IN AN ORGANIZED HEALTH CARE EDUCATION/TRAINING PROGRAM

## 2024-12-06 PROCEDURE — 25010000002 MORPHINE PER 10 MG: Performed by: EMERGENCY MEDICINE

## 2024-12-06 PROCEDURE — 97162 PT EVAL MOD COMPLEX 30 MIN: CPT

## 2024-12-06 PROCEDURE — 80048 BASIC METABOLIC PNL TOTAL CA: CPT | Performed by: EMERGENCY MEDICINE

## 2024-12-06 PROCEDURE — 97166 OT EVAL MOD COMPLEX 45 MIN: CPT

## 2024-12-06 PROCEDURE — 99232 SBSQ HOSP IP/OBS MODERATE 35: CPT

## 2024-12-06 PROCEDURE — 83002 ASSAY OF GONADOTROPIN (LH): CPT | Performed by: PHYSICIAN ASSISTANT

## 2024-12-06 PROCEDURE — 85025 COMPLETE CBC W/AUTO DIFF WBC: CPT | Performed by: EMERGENCY MEDICINE

## 2024-12-06 RX ORDER — OXYCODONE AND ACETAMINOPHEN 10; 325 MG/1; MG/1
1 TABLET ORAL EVERY 6 HOURS PRN
Qty: 12 TABLET | Refills: 0 | Status: SHIPPED | OUTPATIENT
Start: 2024-12-06 | End: 2024-12-09

## 2024-12-06 RX ORDER — PREDNISONE 10 MG/1
TABLET ORAL
Qty: 12 TABLET | Refills: 0 | Status: SHIPPED | OUTPATIENT
Start: 2024-12-06 | End: 2024-12-12

## 2024-12-06 RX ORDER — PREDNISONE 10 MG/1
10 TABLET ORAL DAILY
Status: DISCONTINUED | OUTPATIENT
Start: 2024-12-10 | End: 2024-12-06 | Stop reason: HOSPADM

## 2024-12-06 RX ORDER — PREDNISONE 20 MG/1
20 TABLET ORAL DAILY
Status: DISCONTINUED | OUTPATIENT
Start: 2024-12-08 | End: 2024-12-06 | Stop reason: HOSPADM

## 2024-12-06 RX ADMIN — Medication 10 ML: at 10:15

## 2024-12-06 RX ADMIN — GADOTERIDOL 20 ML: 279.3 INJECTION, SOLUTION INTRAVENOUS at 04:09

## 2024-12-06 RX ADMIN — OXYCODONE 5 MG: 5 TABLET ORAL at 10:16

## 2024-12-06 RX ADMIN — ACETAMINOPHEN 1000 MG: 500 TABLET, FILM COATED ORAL at 04:22

## 2024-12-06 RX ADMIN — LAMOTRIGINE 150 MG: 100 TABLET ORAL at 10:16

## 2024-12-06 RX ADMIN — PREDNISONE 30 MG: 10 TABLET ORAL at 12:15

## 2024-12-06 RX ADMIN — MORPHINE SULFATE 4 MG: 4 INJECTION, SOLUTION INTRAMUSCULAR; INTRAVENOUS at 04:23

## 2024-12-06 RX ADMIN — SENNOSIDES AND DOCUSATE SODIUM 2 TABLET: 50; 8.6 TABLET ORAL at 10:16

## 2024-12-06 RX ADMIN — ALPRAZOLAM 0.5 MG: 0.5 TABLET ORAL at 00:50

## 2024-12-06 RX ADMIN — ATORVASTATIN CALCIUM 80 MG: 40 TABLET, FILM COATED ORAL at 10:16

## 2024-12-06 RX ADMIN — SERTRALINE HYDROCHLORIDE 100 MG: 100 TABLET, FILM COATED ORAL at 10:16

## 2024-12-06 RX ADMIN — PANTOPRAZOLE SODIUM 40 MG: 40 TABLET, DELAYED RELEASE ORAL at 10:16

## 2024-12-06 RX ADMIN — OXYCODONE 5 MG: 5 TABLET ORAL at 01:05

## 2024-12-06 RX ADMIN — ASPIRIN 81 MG CHEWABLE TABLET 81 MG: 81 TABLET CHEWABLE at 10:16

## 2024-12-06 RX ADMIN — SODIUM CHLORIDE 125 ML/HR: 9 INJECTION, SOLUTION INTRAVENOUS at 10:22

## 2024-12-06 NOTE — CASE MANAGEMENT/SOCIAL WORK
Continued Stay Note  [facility]     Patient Name: Elyssa Olivarez  MRN: [id number]  Today's Date: 12/6/2024    Admit Date: 12/4/2024    Plan: Return home with mother who is  her FT Paid Caregiver through [facility]. OP PT at [facility]   Discharge Plan       Row Name 12/06/24 1143       Plan    Plan Return home with mother who is  her FT Paid Caregiver through [facility]. OP PT at [facility]    Plan Comments MARITZA met with Ms. Olivarez, her mother and stepfather. Her mother is paid caregiver through her Medicaid waiver and [facility]. CM called Rachel , Supervisory Nurse with [facility] at [phone] and informed her of dc home today with OP PT and added to Epic but they do not have access. She would like mother to call [facility] when she gets home. CM informed her mother. Her mother chooses [facility] for  OP PT and CM called and informed the clinic and added to Epic. They will call her mother to schedule                    Haylee GUTIÉRREZ,RN Case Manager  [facility]  Phone: Desk- [phone]  Cell- [phone]

## 2024-12-06 NOTE — PROGRESS NOTES
Neurosurgery Progress Note    Date: 24     Patient: Elyssa Olivarez   Sex: female   : 1985      SUBJECTIVE    CC: Low back and left leg pain    Interval history:  Patient continues to have left leg pain.  Reports no new symptoms at this time.        Current Medications:  Scheduled Meds:acetaminophen, 1,000 mg, Oral, Q8H  aspirin, 81 mg, Oral, Daily  atorvastatin, 80 mg, Oral, Daily  cyclobenzaprine, 10 mg, Oral, Nightly  lamoTRIgine, 150 mg, Oral, BID  oxyCODONE, 5 mg, Oral, Q8H  pantoprazole, 40 mg, Oral, Daily  senna-docusate sodium, 2 tablet, Oral, BID  sertraline, 100 mg, Oral, Daily  sodium chloride, 10 mL, Intravenous, Q12H  traZODone, 50 mg, Oral, Nightly      Continuous Infusions:sodium chloride, 125 mL/hr, Last Rate: 125 mL/hr (24 0521)      PRN Meds:   ALPRAZolam    senna-docusate sodium **AND** polyethylene glycol **AND** bisacodyl **AND** bisacodyl    melatonin    Morphine **AND** naloxone    ondansetron    [COMPLETED] Insert Peripheral IV **AND** sodium chloride    sodium chloride    sodium chloride      OBJECTIVE  Vitals:    24 0000 24 0100 24 0444 24 0600   BP:   118/82 109/67   BP Location:   Right arm    Patient Position:   Lying    Pulse: 94 97     Resp:       Temp:   97.5 °F (36.4 °C)    TempSrc:   Oral    SpO2: 95% 96%     Weight:       Height:           Physical exam    General  - WD/WN female, appears their stated age  - Awake, cooperative, in no acute distress  HEENT  - Normocephalic, atraumatic  - PERRLA, EOM intact  Respiratory  - Normal respiratory rate and effort  Musculoskeletal  - No joint redness, swelling, or tenderness  Skin  - Warm and dry, no bleeding, bruising, or rash  NEUROLOGIC  - A/O x3, GCS 4-6-5  - CN II-XII grossly intact  - Moves all extremities symmetrically and w/ 5/5 strength  - Sensation intact throughout        Results review  CBC          2024    00:25 2024    01:03   CBC   WBC 13.55  10.08    RBC 4.83  4.63     Hemoglobin 14.4  13.3    Hematocrit 43.2  41.4    MCV 89.4  89.4    MCH 29.8  28.7    MCHC 33.3  32.1    RDW 12.2  12.2    Platelets 312  312        BMP          12/5/2024    00:25 12/6/2024    01:03   BMP   BUN 8  12    Creatinine 1.07  0.65    Sodium 138  138    Potassium 4.0  4.3    Chloride 100  101    CO2 23.1  26.9    Calcium 10.3  9.8                MRI Lumbar Spine With & Without Contrast    Result Date: 12/6/2024  MRI LUMBAR SPINE W WO CONTRAST Date of Exam: 12/6/2024 3:27 AM EST Indication: Intramedullary tumor and lumbar spine.  Comparison: 11/27/2024. Technique:  Routine multiplanar/multisequence sequence images of the lumbar spine were obtained before and after the uneventful administration of Prohance.  Findings: Five lumbar type vertebral bodies are identified.  Alignment is anatomic. There is mild narrowing of the L4-L5 disc with mild desiccation. Vertebral bodies maintain normal height.  Distal spinal cord and conus medullaris appear unremarkable terminating at the L1 level. There is redemonstration of an oval circumscribed intradural extra medullary mass in the lower lumbar spinal canal at the upper L5 level measuring 18 x 11 x 11 mm. The mass appears hyperintense on STIR imaging, slightly hyperintense on T2 imaging and hypointense on T1 imaging with relatively diffuse enhancement. The mass occurs at the midline and is most consistent with schwannoma. Meningioma is a consideration. The mass fills the spinal canal at this level and splays the fibers of the  cauda equina. No additional abnormal enhancement. Bone marrow signal is within normal limits.  Paraspinal musculature is preserved.  Retroperitoneal structures appear unremarkable. L1-L2: No focal disc protrusion or extrusion. Facet joints appear unremarkable. No significant neural foraminal or spinal canal stenosis. L2-L3: No focal disc protrusion or extrusion. Facet joints appear unremarkable. No significant neural foraminal or spinal canal  stenosis. L3-L4: There is mild concentric disc bulge and mild facet hypertrophy. There is mild left neuroforaminal narrowing. L4-L5: There is concentric disc bulge. There is a left foraminal disc protrusion with annular fissure. There is moderate facet and ligamentum flavum hypertrophy with facet joint effusions. There is mild bilateral neuroforaminal narrowing and mild narrowing of the spinal canal. L5-S1: No focal disc protrusion or extrusion. Facet joints appear unremarkable. No significant neural foraminal or spinal canal stenosis.     Impression: Impression: 1.18 mm enhancing intradural extra medullary mass in the lower lumbar spinal canal at the upper L5 level most consistent with schwannoma. Meningioma is a less likely consideration. 2.Mild lumbar spondylosis with mild spinal canal narrowing at L4-L5 and mild neuroforaminal narrowing at L3-L4 and L4-L5. Electronically Signed: Jesse Eddy MD  12/6/2024 4:54 AM EST  Workstation ID: [id number]    MRI Thoracic Spine With & Without Contrast    Result Date: 12/6/2024  MRI THORACIC SPINE W WO CONTRAST Date of Exam: 12/6/2024 3:26 AM EST Indication: Extra medullary nodule found in lumbar spine.  Comparison: None available. Technique:  Routine multiplanar/multisequence sequence images of the thoracic spine were obtained before and after the uneventful administration of Prohance.  Findings: 12 thoracic vertebral bodies are identified.  Alignment is anatomic.  Disc spaces maintain normal height.  Vertebral bodies maintain normal height.  There is no focal bone marrow edema or evidence of a marrow replacing process. There is a small hemangioma within the T2 vertebral body. There is a larger hemangioma within the right side of the T3 vertebral body extending into the right pedicle. Spinal cord signal is normal.  Paraspinal musculature is preserved. Limited view of the intrathoracic structures is unremarkable. No abnormal enhancement. Level by level evaluation  demonstrates no focal disc protrusion or extrusion. There is no significant neural foraminal or spinal canal narrowing.     Impression: Impression: 1.No acute osseous abnormality. 2.No abnormal enhancement. Electronically Signed: Jesse Eddy MD  12/6/2024 4:48 AM EST  Workstation ID: [id number]    MRI Cervical Spine With & Without Contrast    Result Date: 12/6/2024  MRI CERVICAL SPINE W WO CONTRAST Date of Exam: 12/6/2024 3:23 AM EST Indication: intramedullary nodule found in lumbar spine.  Comparison: None available. Technique:  Routine multiplanar/multisequence sequence images of the cervical spine were obtained before and after the uneventful administration of Prohance.  Findings: Seven cervical vertebrae are identified. Alignment is anatomic. There is mild narrowing and desiccation at the C5-C6 and C6-C7 discs. There is desiccation of the C4-C5 disc. Vertebral bodies maintain normal height.  There is no focal bone marrow edema. There is no evidence of a marrow replacing process. No acute osseous abnormalities. Spinal cord signal is normal. Paraspinal musculature is grossly preserved. No evidence of a neck mass. There are mildly enlarged bilateral level III lymph nodes, likely reactive. The craniocervical junction appears within normal limits. Limited view of the posterior fossa contents is unremarkable. No abnormal enhancement. C2-C3: The posterior disc is unremarkable. The uncovertebral joints appear within normal limits. No significant facet arthropathy. No significant neural foraminal or spinal canal stenosis. C3-C4: The posterior disc is unremarkable. The uncovertebral joints appear within normal limits. No significant facet arthropathy. No significant neural foraminal or spinal canal stenosis. C4-C5: The posterior disc is unremarkable. The uncovertebral joints appear within normal limits. No significant facet arthropathy. No significant neural foraminal or spinal canal stenosis. C5-C6: There is left  paracentral disc osteophyte complex. There is mild bilateral uncovertebral hypertrophy. Facet joints appear unremarkable. No neuroforaminal narrowing. There is mild narrowing at the left side of the spinal canal. C6-C7: The posterior disc is unremarkable. The uncovertebral joints appear within normal limits. No significant facet arthropathy. No significant neural foraminal or spinal canal stenosis. C7-T1: The posterior disc is unremarkable. No significant facet arthropathy. No significant neural foraminal or spinal canal stenosis.     Impression: Impression: 1.No evidence of a spinal cord lesion. 2.Mild cervical spondylosis with mild narrowing of the left side of the spinal canal at the C5-C6 level. Electronically Signed: Jesse Eddy MD  12/6/2024 4:45 AM EST  Workstation ID: [id number]      MRI Brain With & Without Contrast    Result Date: 12/6/2024  MRI BRAIN W WO CONTRAST Date of Exam: 12/6/2024 3:25 AM EST Indication: Intramedullary nodule found in lumbar spine.  Comparison: Brain MRI 12/19/2016. Technique:  Routine multiplanar/multisequence sequence images of the brain were obtained before and after the uneventful administration of Prohance. Findings: There is no diffusion restriction to suggest acute infarct. There is no evidence of acute or chronic intracranial hemorrhage.  No mass effect or midline shift. No abnormal extra-axial collections.  There are no significant signal abnormalities within the  brain parenchyma.  The major vascular flow voids appear intact.  The basal ganglia, brainstem and cerebellum appear within normal limits.  Calvarial and superficial soft tissue signal is within normal limits.  Orbits appear unremarkable.  The paranasal sinuses and the mastoid air cells appear well aerated. There is interval development of some sellar expansion. There is a cystic area in the right posterior sella measuring 13 x 8 mm, which displaces the pituitary gland anteriorly and to the left. There is no  abnormal intracranial enhancement.  There is normal enhancement within the intracranial vasculature including the dural venous sinuses.     Impression: Impression: 1.No acute intracranial abnormality. 2.Interval development of a 13 x 8 mm cystic area in the right posterior sella, which displaces the pituitary gland anteriorly and to the left. This could represent a Rathke's cleft cyst or cystic pituitary adenoma. Correlate for pituitary dysfunction. Electronically Signed: Jesse Eddy MD  12/6/2024 4:40 AM EST  Workstation ID: [id number]       ASSESSMENT/PLAN  This is a 38 y.o. female being followed by neurosurgery for low back and left leg pain.  Patient had full MRI of her spine in addition to brain completed yesterday.  Likely at this time patient has been found to have a schwannoma in her lumbar spine.  She also has a known pituitary cyst, which currently has no symptoms from at this time.    Explained to the patient and her mother that a schwannoma is a benign tumor that has been growing for quite some time.  Explained that this is not urgent, but will definitely need to be removed.  I spoke with Dr. Rodriguez and he decided to have the patient follow-up in clinic to discuss surgery.    At this time there is nothing further needed from a neurosurgical standpoint.  I suggest sending the patient home with steroids to help ease her symptoms.  I also recommend having the patient work with physical therapy prior to discharge.    Please reach out any questions or concerns.    Plan:  Schwannoma lumbar spine  -Patient to follow-up in clinic for surgical discussion  -Recommend patient working with PT prior to going home  -Recommend sending patient home with steroids to help with symptoms  -Continue pain management per primary        I discussed my assessment and recommendations with Dr. Michael Hodge PA-C  12/06/24  09:02 EST      Part of this note may be an electronic transcription/translation of spoken language  to printed text using the Dragon Dictation System.

## 2024-12-06 NOTE — PLAN OF CARE
Goal Outcome Evaluation:  Plan of Care Reviewed With: patient           Outcome Evaluation: 37 yo F who presented to [facility] on 12/04/24 with c/o back pain that radiates down the LLE. Patient and her mother state that she has been experiencing her low back pain with radiating symptoms since this summer. Mother and patient also stated that she has a history of hip dysplasia that she was diagnosed as a young child in addition to having a brain tumor back in 2010. MRI of her lumbar spine that shows an intramedullary tumor at L5. Pt is accompanied by her mother who is her paid caregiver and assists with prior history, though pt is a good historian. Pt lives with mother and step father. She gets assistance at home with all dressing, bathing, and toileting tasks, and always has had assistance per mother's report. She uses a walker in the home and wheelchair when in the community. This date, pt describes pain in back and left leg 7/10. LLE has numbness and tingling along with pain, but pt able to sit EOB with CGA, educated on logrolling for pain mgmt. She remains at her PLOF for ADLs, getting assistance with lower body tasks. Able to ambulate in room with RW, but with increased pain. Mod A needed to return to supine. Safe to return home, recommend OP therapy. Will sign off.    Anticipated Discharge Disposition (OT): home with 24/7 care, home with outpatient therapy services

## 2024-12-06 NOTE — DISCHARGE PLACEMENT REQUEST
"Veronica Chao E \"Janki\" (38 y.o. Female)       Date of Birth   1985    Social Security Number       [place]   [address] [facility] [place] [facility] [place] [address]    Home Phone   [phone]    MRN   [id number]       Ep[employer]   [employer]    Marital Status   Single                            Admission Date   12/4/24    Admission Type   Emergency    Admitting Provider   Delmar Acuna MD    Attending Provider   Venancio Lake MD    Department, Room/Bed   [facility] OBSERVATION, 232/1       Discharge Date       Discharge Disposition   [place] [facility] [place]charge Destination                                 Attending Provider: Venancio Lake MD    Allergies: Orange Concentrate [Flavoring Agent]    Isolation: None   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)   Code Status: CPR    Ht: 157.5 cm (62\")   Wt: 104 kg (229 lb 11.5 oz)    Admission Cmt: None   Principal Problem: Back pain [M54.9]                   Active Insurance as of 12/4/2024       Primary Coverage       Payor Plan Insurance Group Employer/Plan Group    ANTHEM MEDICARE REPLACEMENT ANTHEM MEDICARE ADVANTAGE [id number]       Payor Plan Address Payor Plan Phone Number Payor Plan Fax Number Effective Dates    [address] [phone]  7/1/2019 - None Entered    [facility] [address]         Subscriber Name Subscriber Birth Date Member ID       VERONICA CHAO 1985 [id number]               Secondary Coverage       Payor Plan Insurance Group Employer/Plan Group    [place]        Payor Plan Address Payor Plan Phone Number Payor Plan Fax Number Effective Dates    [address] [address]   12/4/2024 - None Entered    [place] [address]         Subscriber Name Subscriber Birth Date Member ID       VERONICA CHAO 1985 [id number]                     Emergency Contacts        (Rel.) Home Phone Work Phone Mobile Phone    SHIRLEY MUELLER (Mother) [phone] " [phone]              Insurance Information                  ANTHEM MEDICARE REPLACEMENT/ANTHEM MEDICARE ADVANTAGE Phone: [phone]    Subscriber: Elyssa Olivarez Subscriber#: [id number]    Group#: [id number] Precert#: --    Authorization#: [id number] Effective Date: --        [place] Phone: --    Subscriber: Elyssa Olivarez Subscriber#: [id number]    Group#: -- Precert#: --    Authorization#: npn 2nd to MA plan Effective Date: --

## 2024-12-06 NOTE — PLAN OF CARE
Goal Outcome Evaluation:  Plan of Care Reviewed With: patient, parent           Outcome Evaluation: Elyssa Olivarez is a 39 yo F who presented to [facility] on 12/04/24 with c/o back pain that radiates down the LLE. Patient and her mother state that she has been experiencing her low back pain with radiating symptoms since this summer. Mother and patient also stated that she has a history of hip dysplasia that she was diagnosed as a young child in addition to having a brain tumor back in 2010. MRI of her lumbar spine that shows an intramedullary tumor at L5. Pt is occompanied by her mother who is her caregiver and assists with prior history. Pt lives with her parents in a single story accessible home. She uses a RW and rollator in the home and a w/c for community distances, and has assist with all ADLs. Today pt is A&Ox4. She reports 7/10 LBP radiating down LLE with numbness and tingling. Pt has LLE weakness and decreased light touch sensation. She is educated on log rolling and is able to complete with CGA and good carryover. She is CGA for transfers and ambulation with RW ~15 ft with c/o pain increasing to 9/10 with weight bearing. Pt is safe to return home at this time due to high level of assist available at home. Recommending OP PT upon d/c for pain management, strengthening, and activity modification.    Anticipated Discharge Disposition (PT): home with outpatient therapy services, home with assist

## 2024-12-06 NOTE — OUTREACH NOTE
Prep Survey      Flowsheet Row Responses   [facility] patient discharged from? [facility]   Is LACE score < 7 ? Yes   Eligibility [facility]   Date of Admission 12/04/24   Date of Discharge 12/06/24   Discharge Disposition Home-Health Care [facility] [place]   Discharge diagnosis *Back pain  Intramedullary mass   Does the patient have one of the following disease processes/diagnoses(primary or secondary)? Other   Does the patient have Home health ordered? Yes   What is the Home health agency?  [facility]   Is there a DME ordered? No   Prep survey completed? Yes            NOAH ESPINOZA - Registered Nurse

## 2024-12-06 NOTE — DISCHARGE SUMMARY
".             [facility] Medicine Services  Discharge Summary    Date of Service: 2024  Patient Name: Elyssa Olivarez  : 1985  MRN: [id number]    Date of Admission: 2024  Discharge Diagnosis: Back pain  Date of Discharge: 2024  Primary Care Physician: Vimal Moseley PA-C      Presenting Problem:   Back pain [M54.9]  Abnormal MRI [R93.89]  Acute low back pain, unspecified back pain laterality, unspecified whether sciatica present [M54.50]    Active and Resolved Hospital Problems:  Active Hospital Problems    Diagnosis POA    **Back pain [M54.9] Yes    Abnormal MRI, lumbar spine [R93.7] Yes    Anxiety disorder [F41.9] Yes    Depression [F32.A] Yes      Resolved Hospital Problems   No resolved problems to display.         Hospital Course     HPI:    \" Elyssa Olivarez is a 38 y.o. female with a CMH of brain tumor, hip dysplasia who presented to [facility] on 2024 with  back pain. 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 She had a brain tumor in  that she received medication for, pt and family are unsure of specifics. DENG consulted by ED, planning for additional MRI scans. Due to expected delay in MRI availability decision was made to admit patient to have them done. Admitted to hospitalist team with DENG consulted. 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  Recommend PT prior to going home and discharging with steroid taper.  - Pain control as needed.     Hyperlipidemia  - cont home atorvastatin, holding zetia as not on formulary     Anxiety  Depression  - Cont home sertraline  - pt on PRN ativan at home, reports using ~1-2x weekly. 0.5mg ativan BID PRN ordered     Seizures  - Hx absence " seizures, well controlled  - cont home lamotrigine     Insomnia  - Melatonin  - cont home trazodone      GERD  - cont home protonix    Seen and examined this morning, sleeping comfortably.  On waking her up she is alert and oriented and knows what is going on.  Spoke with neurosurgery and discussed discharging with steroid taper and as needed pain medication.  Patient will follow-up outpatient with neurosurgery for elective surgery.  All questions and concerns addressed.    DISCHARGE Follow Up Recommendations for labs and diagnostics: Neurosurgery and PCP        Day of Discharge     Vital Signs:  Temp:  [97.5 °F (36.4 °C)-98.5 °F (36.9 °C)] 97.5 °F (36.4 °C)  Heart Rate:  [] 97  Resp:  [16-18] 18  BP: (101-134)/(67-89) 109/67    Physical Exam:  Physical Exam   General: She is not in acute distress.     Appearance: She is obese.   HENT:      Head: Normocephalic and atraumatic.      Mouth/Throat:      Mouth: Mucous membranes are moist.      Pharynx: Oropharynx is clear.   Eyes:      Extraocular Movements: Extraocular movements intact.      Conjunctiva/sclera: Conjunctivae normal.   Cardiovascular:      Rate and Rhythm: Normal rate and regular rhythm.   Pulmonary:      Effort: Pulmonary effort is normal.   Abdominal:      General: Abdomen is flat.      Palpations: Abdomen is soft.   Musculoskeletal:      Cervical back: Normal range of motion and neck supple.      Right lower leg: No edema.      Left lower leg: No edema.      Comments: Stephan UE ROM intact. Stephan LE ROM limited 2/2 pain   Skin:     General: Skin is warm and dry.   Neurological:      Mental Status: She is alert and oriented to person, place, and time. Mental status is at baseline.      Cranial Nerves: No cranial nerve deficit.      Sensory: Sensory deficit present.      Mood and Affect: Mood normal.          Pertinent  and/or Most Recent Results     LAB RESULTS:      Lab 12/06/24  0103 12/05/24  0025   WBC 10.08 13.55*   HEMOGLOBIN 13.3 14.4    HEMATOCRIT 41.4 43.2   PLATELETS 312 312   NEUTROS ABS 4.12 7.69*   IMMATURE GRANS (ABS) 0.02 0.04   LYMPHS ABS 4.99* 4.84*   MONOS ABS 0.77 0.79   EOS ABS 0.13 0.12   MCV 89.4 89.4         Lab 12/06/24  0103 12/05/24  0025   SODIUM 138 138   POTASSIUM 4.3 4.0   CHLORIDE 101 100   CO2 26.9 23.1   ANION GAP 10.1 14.9   BUN 12 8   CREATININE 0.65 1.07*   EGFR 117.2 69.2   GLUCOSE 135* 147*   CALCIUM 9.8 10.3   TSH 3.530  --                          Brief Urine Lab Results  (Last result in the past 365 days)        Color   Clarity   Blood   Leuk Est   Nitrite   Protein   CREAT   Urine HCG        12/05/24 1319 Yellow   Clear   Negative   Negative   Negative   Negative                 Microbiology Results (last 10 days)       ** No results found for the last 240 hours. **            MRI Lumbar Spine With & Without Contrast    Result Date: 12/6/2024  Impression: Impression: 1.18 mm enhancing intradural extra medullary mass in the lower lumbar spinal canal at the upper L5 level most consistent with schwannoma. Meningioma is a less likely consideration. 2.Mild lumbar spondylosis with mild spinal canal narrowing at L4-L5 and mild neuroforaminal narrowing at L3-L4 and L4-L5. Electronically Signed: Jesse Eddy MD  12/6/2024 4:54 AM EST  Workstation ID: [id number]    MRI Thoracic Spine With & Without Contrast    Result Date: 12/6/2024  Impression: Impression: 1.No acute osseous abnormality. 2.No abnormal enhancement. Electronically Signed: Jesse Eddy MD  12/6/2024 4:48 AM EST  Workstation ID: [id number]    MRI Cervical Spine With & Without Contrast    Result Date: 12/6/2024  Impression: Impression: 1.No evidence of a spinal cord lesion. 2.Mild cervical spondylosis with mild narrowing of the left side of the spinal canal at the C5-C6 level. Electronically Signed: Jesse Eddy MD  12/6/2024 4:45 AM EST  Workstation ID: [id number]    MRI Brain With & Without Contrast    Result Date: 12/6/2024  Impression: Impression: 1.No  acute intracranial abnormality. 2.Interval development of a 13 x 8 mm cystic area in the right posterior sella, which displaces the pituitary gland anteriorly and to the left. This could represent a Rathke's cleft cyst or cystic pituitary adenoma. Correlate for pituitary dysfunction. Electronically Signed: Jesse Eddy MD  12/6/2024 4:40 AM EST  Workstation ID: [id number]                 Labs Pending at Discharge:  Pending Results       Procedure [Order ID] Specimen - Date/Time    [facility] [[id number]] Collected: 12/06/24 0103    Specimen: Blood from Arm, Right Updated: 12/06/24 0817            Procedures Performed           Consults:   Consults       Date and Time Order Name Status Description    12/5/2024  1:26 PM Inpatient Hospitalist Consult      12/5/2024  1:33 AM Inpatient Neurosurgery Consult Completed               Discharge Details        Discharge Medications        New Medications        Instructions Start Date   predniSONE 10 MG tablet  Commonly known as: DELTASONE   Take 3 tablets by mouth Daily for 2 days, THEN 2 tablets Daily for 2 days, THEN 1 tablet Daily for 2 days.   Start Date: December 6, 2024            Continue These Medications        Instructions Start Date   aspirin 81 MG chewable tablet   81 mg, Daily      atorvastatin 80 MG tablet  Commonly known as: LIPITOR   80 mg, Oral, Daily      clindamycin-benzoyl peroxide 1-5 % gel  Commonly known as: BENZACLIN   Topical, 2 Times Daily      cyclobenzaprine 10 MG tablet  Commonly known as: FLEXERIL   10 mg, Oral, Nightly      ezetimibe 10 MG tablet  Commonly known as: Zetia   10 mg, Oral, Daily, For cholesterol      HYDROcodone-acetaminophen 5-325 MG per tablet  Commonly known as: NORCO   1 tablet, Oral, Every 6 Hours PRN      lamoTRIgine 150 MG tablet  Commonly known as: LaMICtal   150 mg, Oral, 2 Times Daily      LORazepam 1 MG tablet  Commonly known as: ATIVAN   1 mg, Oral, Every 8 Hours PRN      meloxicam 15 MG tablet  Commonly known as: MOBIC    15 mg, Oral, Daily, 1 PO Daily with food.      pantoprazole 40 MG EC tablet  Commonly known as: PROTONIX   40 mg, Oral, Daily      Rollator Ultra-Light misc   1 Units, Not Applicable, Daily      sertraline 100 MG tablet  Commonly known as: ZOLOFT   100 mg, Oral, Daily, with food.      traZODone 50 MG tablet  Commonly known as: DESYREL   50 mg, Oral, Nightly               Allergies   Allergen Reactions    [place] Concentrate [Flavoring Agent] Hives     Orange ibuprofen         Discharge Disposition: Home with family  Home-Health Care [facility] [place] [facility]    Diet:  Hospital:  Diet Order   Procedures    Diet: Regular/House; Fluid Consistency: Thin (IDDSI 0)         Discharge Activity:         CODE STATUS:  Code Status and Medical Interventions: CPR (Attempt to Resuscitate); Full Support   Ordered at: 12/05/24 0050     Level Of Support Discussed With:    Patient     Code Status (Patient has no pulse and is not breathing):    CPR (Attempt to Resuscitate)     Medical Interventions (Patient has pulse or is breathing):    Full Support         Future Appointments   Date Time Provider Department Center   12/23/2024 11:30 AM Vimal Moseley PA-C [facility]           Time spent on Discharge including face to face service:  > 30 minutes    Signature: Electronically signed by Venancio Lake MD, 12/06/24, 10:32 EST.  [facility] Hospitalist Team

## 2024-12-06 NOTE — PLAN OF CARE
Problem: Adult Inpatient Plan of Care  Goal: Plan of Care Review  Outcome: Progressing  Flowsheets (Taken 12/6/2024 0528)  Outcome Evaluation:   MRI COMPLETED  AM   PT CONTINUES NEEDING PAIN MEDS  Goal: Patient-Specific Goal (Individualized)  Outcome: Progressing  Goal: Absence of Hospital-Acquired Illness or Injury  Outcome: Progressing  Intervention: Identify and Manage Fall Risk  Recent Flowsheet Documentation  Taken 12/6/2024 0422 by Katelynn Land RN  Safety Promotion/Fall Prevention: (MOTHER IN ROOM)   safety round/check completed   nonskid shoes/slippers when out of bed   lighting adjusted   activity supervised   other (see comments)  Taken 12/6/2024 0152 by Katelynn Land RN  Safety Promotion/Fall Prevention: safety round/check completed  Taken 12/6/2024 0000 by Katelynn Land RN  Safety Promotion/Fall Prevention: safety round/check completed  Taken 12/5/2024 2352 by Katelynn Land RN  Safety Promotion/Fall Prevention:   safety round/check completed   nonskid shoes/slippers when out of bed   lighting adjusted  Taken 12/5/2024 2200 by Katelynn Land RN  Safety Promotion/Fall Prevention: safety round/check completed  Taken 12/5/2024 2000 by Katelynn Land RN  Safety Promotion/Fall Prevention:   safety round/check completed   nonskid shoes/slippers when out of bed   lighting adjusted  Goal: Optimal Comfort and Wellbeing  Outcome: Progressing  Intervention: Monitor Pain and Promote Comfort  Recent Flowsheet Documentation  Taken 12/6/2024 0422 by Katelynn Land RN  Pain Management Interventions:   pillow support provided   position adjusted   pain medication given  Taken 12/5/2024 2122 by Katelynn Land RN  Pain Management Interventions: pain medication given  Goal: Readiness for Transition of Care  Outcome: Progressing   Goal Outcome Evaluation:              Outcome Evaluation: MRI COMPLETED  AM; PT CONTINUES NEEDING PAIN MEDS

## 2024-12-06 NOTE — THERAPY EVALUATION
Patient Name: Elyssa Olivarez  : 1985    MRN: [id number]                              Today's Date: 2024       Admit Date: 2024    Visit Dx:     ICD-10-CM ICD-9-CM   1. Acute low back pain, unspecified back pain laterality, unspecified whether sciatica present  M54.50 724.2   2. Abnormal MRI  R93.89 793.99   3. Pituitary adenoma  D35.2 227.3   4. Abnormal MRI, lumbar spine  R93.7 793.7   5. Radicular leg pain  M54.10 724.4     Patient Active Problem List   Diagnosis    Acne    Allergic rhinitis    Autism    Ankle pain    Dilated pupil    Displaced bimalleolar fracture of left lower leg, initial encounter for closed fracture    Fatigue    Cholelithiasis    Gastroesophageal reflux disease    Headache    Hyperglycemia    Mixed hyperlipidemia    Anxiety disorder    Depression    Mood disorder    Class 2 severe obesity with serious comorbidity and body mass index (BMI) of 35.0 to 35.9 in adult    Thyroid nodule    Tonic pupillary reaction    Impacted cerumen of right ear    Chronic midline thoracic back pain    Sciatic nerve pain, right    Candidal intertrigo    Medicare annual wellness visit, subsequent    RLS (restless legs syndrome)    Vitamin D deficiency    Carbuncle    Abscess of left buttock    Hip dysplasia    Back pain    Radicular leg pain    Abnormal MRI, lumbar spine     Past Medical History:   Diagnosis Date    Allergic     Ankle pain, chronic     right    Autism disorder     Depression     Goiter     Hearing loss     Hip dysplasia, congenital     Left    Hyperglycemia     Hyperlipidemia     Migraine headache     Mood disorder     Obesity     Partial hearing loss     Pupil dilation     right    Right foot pain     Chronic     Past Surgical History:   Procedure Laterality Date    ANKLE SURGERY Bilateral     GALLBLADDER SURGERY      HYSTERECTOMY      TYMPANOPLASTY        General Information       Row Name 24 1556          OT Time and Intention    Document Type evaluation  -LS      Mode of Treatment occupational therapy  -LS     Patient Effort good  -LS       Row Name 12/06/24 1556          General Information    Patient Profile Reviewed yes  -LS     Prior Level of Function mod assist:;ADL's;independent:;all household mobility  Rolling walker  -LS     Existing Precautions/Restrictions fall  -LS     Barriers to Rehab medically complex;previous functional deficit  -LS       Row Name 12/06/24 1556          Living Environment    People in Home parent(s)  -LS       Row Name 12/06/24 1556          Home Main Entrance    Number of Stairs, Main Entrance none  -LS       Row Name 12/06/24 1556          Stairs Within Home, Primary    Number of Stairs, Within Home, Primary none  -LS       Row Name 12/06/24 1556          Cognition    Orientation Status (Cognition) oriented x 4  -LS       Row Name 12/06/24 1556          Safety Issues/Impairments Affecting Functional Mobility    Impairments Affecting Function (Mobility) balance;endurance/activity tolerance;strength;pain;postural/trunk control  -LS               User Key  (r) = Recorded By, (t) = Taken By, (c) = Cosigned By      Initials Name Provider Type    LS Jim Carlos OT Occupational Therapist                     Mobility/ADL's       Row Name 12/06/24 1607          Bed Mobility    Bed Mobility bed mobility (all) activities  -LS     All Activities, [place] (Bed Mobility) contact guard  -LS     Assistive Device (Bed Mobility) bed rails  -       Row Name 12/06/24 1607          Transfers    Transfers sit-stand transfer  -       Row Name 12/06/24 1607          Sit-Stand Transfer    Sit-Stand [place] (Transfers) contact guard  -LS     Assistive Device (Sit-Stand Transfers) walker, front-wheeled  -LS       Row Name 12/06/24 1607          Functional Mobility    Functional Mobility- Ind. Level contact guard assist  -LS     Functional Mobility- Device walker, front-wheeled  -LS     Patient was able to Ambulate yes  -LS       Row Name 12/06/24 1607           Activities of Daily Living    BADL Assessment/Intervention other (see comments)  Mod-Max A with dressing, bathing, toileting at baseline. Remains the same this date.  -LS               User Key  (r) = Recorded By, (t) = Taken By, (c) = Cosigned By      Initials Name Provider Type    Jim Solano OT Occupational Therapist                   Obj/Interventions       Row Name 12/06/24 1613          Sensory Assessment (Somatosensory)    Sensory Assessment (Somatosensory) sensation intact  -       Row Name 12/06/24 1613          Range of Motion Comprehensive    General Range of Motion bilateral upper extremity ROM WFL  -LS       Row Name 12/06/24 1613          Strength Comprehensive (MMT)    Comment, General Manual Muscle Testing (MMT) Assessment BUE strength WFL, limited slightly by pain  -       Row Name 12/06/24 1613          Balance    Balance Assessment sitting static balance;sitting dynamic balance;standing static balance;standing dynamic balance  -LS     Static Sitting Balance independent  -LS     Dynamic Sitting Balance independent  -LS     Position, Sitting Balance unsupported;sitting edge of bed  -LS     Static Standing Balance contact guard  -LS     Dynamic Standing Balance contact guard  -LS     Position/Device Used, Standing Balance walker, front-wheeled  -               User Key  (r) = Recorded By, (t) = Taken By, (c) = Cosigned By      Initials Name Provider Type    Jim Solano OT Occupational Therapist                   Goals/Plan    No documentation.                  Clinical Impression       Row Name 12/06/24 1618          Pain Assessment    Pretreatment Pain Rating 7/10  -LS     Posttreatment Pain Rating 9/10  -LS     Pain Location back  -       Row Name 12/06/24 1618          Plan of Care Review    Plan of Care Reviewed With patient  -     Outcome Evaluation 39 yo F who presented [facility] on [facility]/04[facility]24 with c/o back pain that radiates down the LLE. Patient and her mother state  that she has been experiencing her low back pain with radiating symptoms since this summer. Mother and patient also stated that she has a history of hip dysplasia that she was diagnosed as a young child in addition to having a brain tumor back in 2010. MRI of her lumbar spine that shows an intramedullary tumor at L5. Pt is accompanied by her mother who is her paid caregiver and assists with prior history, though pt is a good historian. Pt lives with mother and step father. She gets assistance at home with all dressing, bathing, and toileting tasks, and always has had assistance per mother's report. She uses a walker in the home and wheelchair when in the community. This date, pt describes pain in back and left leg 7/10. LLE has numbness and tingling along with pain, but pt able to sit EOB with CGA, educated on logrolling for pain mgmt. She remains at her PLOF for ADLs, getting assistance with lower body tasks. Able to ambulate in room with RW, but with increased pain. Mod A needed to return to supine. Safe to return home, recommend OP therapy. Will sign off.  -       Row Name 12/06/24 1618          Therapy Assessment/Plan (OT)    Criteria for Skilled Therapeutic Interventions Met (OT) no;no problems identified which require skilled intervention  -     Therapy Frequency (OT) evaluation only  -LS     Predicted Duration of Therapy Intervention (OT) eval only  -       Row Name 12/06/24 1618          Therapy Plan Review/Discharge Plan (OT)    Anticipated Discharge Disposition (OT) home with 24/7 care;home with outpatient therapy services  -       Row Name 12/06/24 1618          Vital Signs    Pre Patient Position Supine  -LS     Intra Patient Position Standing  -LS     Post Patient Position Side Lying  -       Row Name 12/06/24 1618          Positioning and Restraints    Pre-Treatment Position in bed  -LS     Post Treatment Position bed  -LS     In Bed notified nsg;side lying right;call light within  reach;encouraged to call for assist;exit alarm on  -LS               User Key  (r) = Recorded By, (t) = Taken By, (c) = Cosigned By      Initials Name Provider Type    Jim Solano OT Occupational Therapist                   Outcome Measures       Row Name 12/06/24 1629          How much help from another is currently needed...    Putting on and taking off regular lower body clothing? 2  -LS     Bathing (including washing, rinsing, and drying) 2  -LS     Toileting (which includes using toilet bed pan or urinal) 2  -LS     Putting on and taking off regular upper body clothing 2  -LS     Taking care of personal grooming (such as brushing teeth) 3  -LS     Eating meals 4  -LS     AM-PAC 6 Clicks Score (OT) 15  -LS       Row Name 12/06/24 1100          How much help from another person do you currently need...    Turning from your back to your side while in flat bed without using bedrails? 4  -NC     Moving from lying on back to sitting on the side of a flat bed without bedrails? 4  -NC     Moving to and from a bed to a chair (including a wheelchair)? 4  -NC     Standing up from a chair using your arms (e.g., wheelchair, bedside chair)? 4  -NC     Climbing 3-5 steps with a railing? 3  -NC     To walk in hospital room? 3  -NC     AM-PAC 6 Clicks Score (PT) 22  -NC       Row Name 12/06/24 1629          Functional Assessment    Outcome Measure Options AM-PAC 6 Clicks Daily Activity (OT)  -LS               User Key  (r) = Recorded By, (t) = Taken By, (c) = Cosigned By      Initials Name Provider Type    Jim Solano OT Occupational Therapist    Jose Carlos Yancey RN Registered Nurse                    Occupational Therapy Education       Title: PT OT SLP Therapies (Done)       Topic: Occupational Therapy (Done)       Point: ADL training (Done)       Description:   Instruct learner(s) on proper safety adaptation and remediation techniques during self care or transfers.   Instruct in proper use of assistive  devices.                  Learning Progress Summary            Patient Acceptance, E,TB, VU by  at 12/6/2024 1630   Family Acceptance, E,TB, VU by LS at 12/6/2024 1630                      Point: Precautions (Done)       Description:   Instruct learner(s) on prescribed precautions during self-care and functional transfers.                  Learning Progress Summary            Patient Acceptance, E,TB, VU by  at 12/6/2024 1630   Family Acceptance, E,TB, VU by  at 12/6/2024 1630                      Point: Body mechanics (Done)       Description:   Instruct learner(s) on proper positioning and spine alignment during self-care, functional mobility activities and/or exercises.                  Learning Progress Summary            Patient Acceptance, E,TB, VU by  at 12/6/2024 1630   Family Acceptance, E,TB, VU by  at 12/6/2024 1630                                      User Key       Initials Effective Dates Name Provider Type Discipline     09/22/22 -  Jim Carlos OT Occupational Therapist OT                  OT Recommendation and Plan  Therapy Frequency (OT): evaluation only  Plan of Care Review  Plan of Care Reviewed With: patient  Outcome Evaluation: 39 yo F who presented to [facility] on 12/04/24 with c/o back pain that radiates down the LLE. Patient and her mother state that she has been experiencing her low back pain with radiating symptoms since this summer. Mother and patient also stated that she has a history of hip dysplasia that she was diagnosed as a young child in addition to having a brain tumor back in 2010. MRI of her lumbar spine that shows an intramedullary tumor at L5. Pt is accompanied by her mother who is her paid caregiver and assists with prior history, though pt is a good historian. Pt lives with mother and step father. She gets assistance at home with all dressing, bathing, and toileting tasks, and always has had assistance per mother's report. She uses a walker in the home and wheelchair  when in the community. This date, pt describes pain in back and left leg 7/10. LLE has numbness and tingling along with pain, but pt able to sit EOB with CGA, educated on logrolling for pain mgmt. She remains at her PLOF for ADLs, getting assistance with lower body tasks. Able to ambulate in room with RW, but with increased pain. Mod A needed to return to supine. Safe to return home, recommend OP therapy. Will sign off.     Time Calculation:         Time Calculation- OT       Row Name 12/06/24 1630             Time Calculation- OT    OT Start Time 1050  -LS      OT Stop Time 1110  -LS      OT Time Calculation (min) 20 min  -LS      OT Received On 12/06/24  -LS         Untimed Charges    OT Eval/Re-eval Minutes 20  -LS         Total Minutes    Untimed Charges Total Minutes 20  -LS       Total Minutes 20  -LS                User Key  (r) = Recorded By, (t) = Taken By, (c) = Cosigned By      Initials Name Provider Type     Jim Carlos OT Occupational Therapist                  Therapy Charges for Today       Code Description Service Date Service Provider Modifiers Qty    [id number]  OT EVAL MOD COMPLEXITY 4 12/6/2024 Jim Carlos OT GO 1                 Jim Carlos OT  12/6/2024

## 2024-12-06 NOTE — CASE MANAGEMENT/SOCIAL WORK
Case Management Discharge Note      Final Note: Home with OP PT              Home Medical Care Coordination complete.      Service Provider Services Address Phone Fax Patient Preferred    [facility] [address] [place] [address] [phone] --       Internal Comment last updated by Haylee Elliott 12/6/2024 1122    Home  health aide/ caregiver                         Therapy Coordination complete.      Service Provider Services Address Phone Fax Patient Preferred    [facility] PHYSICAL THERAPY [facility] [address] [facility] [place] [address] [phone] --       Internal Comment last updated by Haylee Elliott 12/6/2024 1140    For PT                             Transportation Services  Private: Car    Final Discharge Disposition Code: 01 - home or self-care

## 2024-12-06 NOTE — THERAPY EVALUATION
Patient Name: Elyssa Olivarez  : 1985    MRN: [id number]                              Today's Date: 2024       Admit Date: 2024    Visit Dx:     ICD-10-CM ICD-9-CM   1. Acute low back pain, unspecified back pain laterality, unspecified whether sciatica present  M54.50 724.2   2. Abnormal MRI  R93.89 793.99   3. Pituitary adenoma  D35.2 227.3   4. Abnormal MRI, lumbar spine  R93.7 793.7   5. Radicular leg pain  M54.10 724.4     Patient Active Problem List   Diagnosis    Acne    Allergic rhinitis    Autism    Ankle pain    Dilated pupil    Displaced bimalleolar fracture of left lower leg, initial encounter for closed fracture    Fatigue    Cholelithiasis    Gastroesophageal reflux disease    Headache    Hyperglycemia    Mixed hyperlipidemia    Anxiety disorder    Depression    Mood disorder    Class 2 severe obesity with serious comorbidity and body mass index (BMI) of 35.0 to 35.9 in adult    Thyroid nodule    Tonic pupillary reaction    Impacted cerumen of right ear    Chronic midline thoracic back pain    Sciatic nerve pain, right    Candidal intertrigo    Medicare annual wellness visit, subsequent    RLS (restless legs syndrome)    Vitamin D deficiency    Carbuncle    Abscess of left buttock    Hip dysplasia    Back pain    Radicular leg pain    Abnormal MRI, lumbar spine     Past Medical History:   Diagnosis Date    Allergic     Ankle pain, chronic     right    Autism disorder     Depression     Goiter     Hearing loss     Hip dysplasia, congenital     Left    Hyperglycemia     Hyperlipidemia     Migraine headache     Mood disorder     Obesity     Partial hearing loss     Pupil dilation     right    Right foot pain     Chronic     Past Surgical History:   Procedure Laterality Date    ANKLE SURGERY Bilateral     GALLBLADDER SURGERY      HYSTERECTOMY      TYMPANOPLASTY        General Information       Row Name 24 1528          Physical Therapy Time and Intention    Document Type  evaluation  -     Mode of Treatment physical therapy  -[facility] Name 12/06/24 1528          General Information    Patient Profile Reviewed yes  -     Prior Level of Function independent:;all household mobility;mod assist:;community mobility;ADL's  -     Existing Precautions/Restrictions fall  -[facility] Name 12/06/24 1528          Living Environment    People in Home parent(s)  -[facility] Name 12/06/24 1528          Home Main Entrance    Number of Stairs, Main Entrance other (see comments)  ramp entry  -[facility] Name 12/06/24 1528          Stairs Within Home, Primary    Number of Stairs, Within Home, Primary none  -[facility] Name 12/06/24 1528          Cognition    Orientation Status (Cognition) oriented x 4  -[facility] Name 12/06/24 1528          Safety Issues/Impairments Affecting Functional Mobility    Impairments Affecting Function (Mobility) balance;endurance/activity tolerance;strength;pain;postural/trunk control  -               User Key  (r) = Recorded By, (t) = Taken By, (c) = Cosigned By      Initials Name Provider Type     Elle Khalil, PT Physical Therapist                   Mobility       [facility] Name 12/06/24 1529          Bed Mobility    Bed Mobility bed mobility (all) activities  -     All Activities, [place] (Bed Mobility) contact [facility]  -     Assistive Device (Bed Mobility) bed rails  -[facility] Name 12/06/24 1529          Sit-Stand Transfer    Sit-Stand [place] (Transfers) contact guard  -     Assistive Device (Sit-Stand Transfers) walker, front-wheeled  -[facility] Name 12/06/24 1529          Gait/Stairs (Locomotion)    [place] Level (Gait) contact guard  -     Assistive Device (Gait) walker, front-wheeled  [facility]     Patient was able to Ambulate yes  -     Distance in Feet (Gait) 15  -               User Key  (r) = Recorded By, (t) = Taken By, (c) = Cosigned By      Initials Name Provider Type    Elle Bolton, PT  Physical Therapist                   Obj/Interventions       [facility] Name 12/06/24 1529          Range of Motion Comprehensive    Comment, General Range of Motion LLE lacking full knee AROM and PROM  -[facility] Name 12/06/24 1529          Strength Comprehensive (MMT)    General Manual Muscle Testing (MMT) Assessment lower extremity strength deficits identified  -     Comment, General Manual Muscle Testing (MMT) Assessment LLE grossly 3+/5 and painful  -[facility] Name 12/06/24 1529          Balance    Balance Assessment sitting static balance;sitting dynamic balance;standing static balance;standing dynamic balance  -     Static Sitting Balance supervision  -     Dynamic Sitting Balance standby assist  -     Position, Sitting Balance unsupported;sitting edge of bed  -     Static Standing Balance contact guard  -     Dynamic Standing Balance contact guard  -     Position/Device Used, Standing Balance walker, front-wheeled  -[facility] Name 12/06/24 1529          Sensory Assessment (Somatosensory)    Sensory Assessment (Somatosensory) other (see comments)  LLE light touch sensation impaired  -               User Key  (r) = Recorded By, (t) = Taken By, (c) = Cosigned By      Initials Name Provider Type     Elle Khalil, PT Physical Therapist                   Goals/Plan       [facility] Name 12/06/24 1532          Bed Mobility Goal 1 (PT)    Activity/Assistive Device (Bed Mobility Goal 1, PT) bed mobility activities, all  -     [place] Level/Cues Needed (Bed Mobility Goal 1, PT) independent  -     Time Frame (Bed Mobility Goal 1, PT) long term goal (LTG);2 weeks  -[facility] Name 12/06/24 1532          Transfer Goal 1 (PT)    Activity/Assistive Device (Transfer Goal 1, PT) transfers, all  -     [place] Level/Cues Needed (Transfer Goal 1, PT) modified independence  -     Time Frame (Transfer Goal 1, PT) long term goal (LTG);2 weeks  -[facility] Name 12/06/24 1532           Gait Training Goal 1 (PT)    Activity/Assistive Device (Gait Training Goal 1, PT) gait (walking locomotion)  -     [place] Level (Gait Training Goal 1, PT) modified independence  -     Distance (Gait Training Goal 1, PT) 150 ft  -     Time Frame (Gait Training Goal 1, PT) long term goal (LTG);2 weeks  -       Row Name 12/06/24 1532          Therapy Assessment/Plan (PT)    Planned Therapy Interventions (PT) balance training;bed mobility training;gait training;home exercise program;transfer training;patient/family education;postural re-education;strengthening;neuromuscular re-education  -               User Key  (r) = Recorded By, (t) = Taken By, (c) = Cosigned By      Initials Name Provider Type    Elle Bolton, PT Physical Therapist                   Clinical Impression       Row Name 12/06/24 1531          Pain    Pretreatment Pain Rating 7/10  -     Posttreatment Pain Rating 9/10  -     Pain Location back;extremity  -     Pain Side/Orientation left;lower  -       Row Name 12/06/24 1531          Plan of Care Review    Plan of Care Reviewed With patient;parent  -     Outcome Evaluation Elyssa Olivarez is a 39 yo F who presented to [facility] on 12/04[facility]24 with c/o back pain that radiates down the LLE. Patient and her mother state that she has been experiencing her low back pain with radiating symptoms since this summer. Mother and patient also stated that she has a history of hip dysplasia that she was diagnosed as a young child in addition to having a brain tumor back in 2010. MRI of her lumbar spine that shows an intramedullary tumor at L5. Pt is occompanied by her mother who is her caregiver and assists with prior history. Pt lives with her parents in a single story accessible home. She uses a RW and rollator in the home and a w/c for community distances, and has assist with all ADLs. Today pt is A&Ox4. She reports 7/10 LBP radiating down LLE with numbness and tingling. Pt has  LLE weakness and decreased light touch sensation. She is educated on log rolling and is able to complete with CGA and good carryover. She is CGA for transfers and ambulation with RW ~15 ft with c/o pain increasing to 9/10 with weight bearing. Pt is safe to return home at this time due to high level of assist available at home. Recommending OP PT upon d/c for pain management, strengthening, and activity modification.  -       Row Name 12/06/24 1531          Therapy Assessment/Plan (PT)    Rehab Potential (PT) good  -     Criteria for Skilled Interventions Met (PT) yes  -     Therapy Frequency (PT) 3 times/wk  -       Row Name 12/06/24 1531          Positioning and Restraints    Pre-Treatment Position in bed  -     Post Treatment Position bed  -     In Bed notified nsg;side lying right;call light within reach;with family/caregiver;encouraged to call for assist  -               User Key  (r) = Recorded By, (t) = Taken By, (c) = Cosigned By      Initials Name Provider Type    Elle Bolton, PT Physical Therapist                   Outcome Measures       Row Name 12/06/24 1100          How much help from another person do you currently need...    Turning from your back to your side while in flat bed without using bedrails? 4  -NC     Moving from lying on back to sitting on the side of a flat bed without bedrails? 4  -NC     Moving to and from a bed to a chair (including a wheelchair)? 4  -NC     Standing up from a chair using your arms (e.g., wheelchair, bedside chair)? 4  -NC     Climbing 3-5 steps with a railing? 3  -NC     To walk in hospital room? 3  -NC     AM-PAC 6 Clicks Score (PT) 22  -NC               User Key  (r) = Recorded By, (t) = Taken By, (c) = Cosigned By      Initials Name Provider Type    Jose Carlos Yancey RN Registered Nurse                                 Physical Therapy Education       Title: PT OT SLP Therapies (Done)       Topic: Physical Therapy (Done)       Point:  Mobility training (Done)       Learning Progress Summary            Patient Acceptance, E, VU by  at 12/6/2024 1533                      Point: Home exercise program (Done)       Learning Progress Summary            Patient Acceptance, E, VU by  at 12/6/2024 1533                      Point: Body mechanics (Done)       Learning Progress Summary            Patient Acceptance, E, VU by  at 12/6/2024 1533                      Point: Precautions (Done)       Learning Progress Summary            Patient Acceptance, E, VU by  at 12/6/2024 1533                                      User Key       Initials Effective Dates Name Provider Type Discipline     08/12/24 -  Elle Khalil, PT Physical Therapist PT                  PT Recommendation and Plan  Planned Therapy Interventions (PT): balance training, bed mobility training, gait training, home exercise program, transfer training, patient/family education, postural re-education, strengthening, neuromuscular re-education  Outcome Evaluation: Elyssa Olivarez is a 37 yo F who presented to [facility] on 12/04/24 with c/o back pain that radiates down the LLE. Patient and her mother state that she has been experiencing her low back pain with radiating symptoms since this summer. Mother and patient also stated that she has a history of hip dysplasia that she was diagnosed as a young child in addition to having a brain tumor back in 2010. MRI of her lumbar spine that shows an intramedullary tumor at L5. Pt is occompanied by her mother who is her caregiver and assists with prior history. Pt lives with her parents in a single story accessible home. She uses a RW and rollator in the home and a w/c for community distances, and has assist with all ADLs. Today pt is A&Ox4. She reports 7/10 LBP radiating down LLE with numbness and tingling. Pt has LLE weakness and decreased light touch sensation. She is educated on log rolling and is able to complete with CGA and good  carryover. She is CGA for transfers and ambulation with RW ~15 ft with c/o pain increasing to 9/10 with weight bearing. Pt is safe to return home at this time due to high level of assist available at home. Recommending OP PT upon d/c for pain management, strengthening, and activity modification.     Time Calculation:         PT Charges       Row Name 12/06/24 1533             Time Calculation    Start Time 1050  -      Stop Time 1115  -      Time Calculation (min) 25 min  -      PT Received On 12/06/24  -      PT - Next Appointment 12/09/24  -      PT Goal Re-Cert Due Date 12/20/24  -                User Key  (r) = Recorded By, (t) = Taken By, (c) = Cosigned By      Initials Name Provider Type    Elle Bolton, SARAH Physical Therapist                  Therapy Charges for Today       Code Description Service Date Service Provider Modifiers Qty    95552673068 HC PT EVAL MOD COMPLEXITY 4 12/6/2024 Elle Khalil, PT GP 1            PT G-Codes  AM-PAC 6 Clicks Score (PT): 22  PT Discharge Summary  Anticipated Discharge Disposition (PT): home with outpatient therapy services, home with assist    Elle Khalil, PT  12/6/2024

## 2024-12-09 ENCOUNTER — PREP FOR SURGERY (OUTPATIENT)
Dept: OTHER | Facility: HOSPITAL | Age: 39
End: 2024-12-09
Payer: MEDICARE

## 2024-12-09 ENCOUNTER — TRANSITIONAL CARE MANAGEMENT TELEPHONE ENCOUNTER (OUTPATIENT)
Dept: CALL CENTER | Facility: HOSPITAL | Age: 39
End: 2024-12-09
Payer: MEDICARE

## 2024-12-09 ENCOUNTER — OFFICE VISIT (OUTPATIENT)
Dept: NEUROSURGERY | Facility: CLINIC | Age: 39
End: 2024-12-09
Payer: MEDICARE

## 2024-12-09 VITALS
OXYGEN SATURATION: 98 % | DIASTOLIC BLOOD PRESSURE: 88 MMHG | HEIGHT: 62 IN | HEART RATE: 76 BPM | WEIGHT: 236 LBS | BODY MASS INDEX: 43.43 KG/M2 | SYSTOLIC BLOOD PRESSURE: 125 MMHG

## 2024-12-09 DIAGNOSIS — D49.7 INTRADURAL EXTRAMEDULLARY SPINAL TUMOR: Primary | ICD-10-CM

## 2024-12-09 DIAGNOSIS — D49.7 PITUITARY TUMOR: ICD-10-CM

## 2024-12-09 DIAGNOSIS — M54.16 LUMBAR RADICULOPATHY: ICD-10-CM

## 2024-12-09 DIAGNOSIS — R79.1 ABNORMAL COAGULATION PROFILE: ICD-10-CM

## 2024-12-09 DIAGNOSIS — R79.9 ABNORMAL FINDING OF BLOOD CHEMISTRY, UNSPECIFIED: ICD-10-CM

## 2024-12-09 PROCEDURE — 1159F MED LIST DOCD IN RCRD: CPT | Performed by: NEUROLOGICAL SURGERY

## 2024-12-09 PROCEDURE — 99213 OFFICE O/P EST LOW 20 MIN: CPT | Performed by: NEUROLOGICAL SURGERY

## 2024-12-09 PROCEDURE — 1160F RVW MEDS BY RX/DR IN RCRD: CPT | Performed by: NEUROLOGICAL SURGERY

## 2024-12-09 RX ORDER — HYDROCODONE BITARTRATE AND ACETAMINOPHEN 5; 325 MG/1; MG/1
1 TABLET ORAL EVERY 6 HOURS PRN
Qty: 30 TABLET | Refills: 0 | Status: SHIPPED | OUTPATIENT
Start: 2024-12-09

## 2024-12-09 RX ORDER — GABAPENTIN 100 MG/1
100 CAPSULE ORAL 3 TIMES DAILY
Qty: 90 CAPSULE | Refills: 3 | Status: SHIPPED | OUTPATIENT
Start: 2024-12-09

## 2024-12-09 NOTE — PROGRESS NOTES
"Subjective   History of Present Illness: Elyssa Olivarez is a 38 y.o. female is here today for a hospital follow up for low back pain.  Briefly, the patient has a 6-month history of low back and leg pain as well as numbness.  She was recently seen in the hospital and found to have benign-appearing intrathecal lesion at L5.  No changes since she was seen in the hospital.  Notably the patient is autistic and majority of history was obtained from her parents.    No chief complaint on file.         Previous treatment:     Previous neurosurgery:      Previous injections:     The following portions of the patient's history were reviewed and updated as appropriate: allergies, current medications, past family history, past medical history, past social history, past surgical history, and problem list.    Review of Systems   Constitutional:  Positive for activity change.   HENT: Negative.     Eyes: Negative.    Respiratory: Negative.     Cardiovascular: Negative.    Gastrointestinal: Negative.    Endocrine: Negative.    Genitourinary: Negative.    Musculoskeletal:  Positive for arthralgias, back pain (centralized) and myalgias.   Skin: Negative.    Allergic/Immunologic: Negative.    Neurological:  Positive for numbness (left leg and arm).   Hematological: Negative.    Psychiatric/Behavioral:  Positive for sleep disturbance.        Objective      /88 (BP Location: Left arm, Patient Position: Sitting)   Pulse 76   Ht 157.5 cm (62\")   Wt 107 kg (236 lb)   SpO2 98%   BMI 43.16 kg/m²    Body mass index is 43.16 kg/m².  Vitals:    12/09/24 0935   PainSc: 10-Worst pain ever         Neurological Exam  Mental Status  Awake, alert and oriented to person, place and time.    Motor    Full strength bilateral upper extremities  Full strength distal lower extremities.  Pain limited exam of iliopsoas and quad.    Sensory  Sensory loss throughout the lower extremities right worse than left.          Assessment & Plan "   Independent Review of Radiographic Studies:      I personally reviewed and interpreted the images from the following studies.    MRI brain: Small lesion within the sella Rathke's cleft cyst versus adenoma    MRI cervical and thoracic spine: No high-grade central or foraminal stenosis.  No cord abnormality    MRI lumbar spine: There is a enhancing intrathecal lesion behind the body of L5 causing displacement and compression of nerve roots within the thecal sac    Medical Decision Making:      Elyssa Olivarez is a 38 y.o. female with a 6-month history of worsening lower extremity pain and numbness with lesion consistent with schwannoma or possibly neurofibroma at the level of L5 causing displacement and compression of the intrathecal nerve roots at this level.  Given the size of lesion in the patient's symptoms she will require resection of the lesion.  Will plan for L4-S1 laminectomy and resection of intrathecal lesion.    In terms of the patient's sellar lesion, I will refer to my partner that deals with this sort of pathology.      Diagnoses and all orders for this visit:    1. Intradural extramedullary spinal tumor (Primary)    2. Lumbar radiculopathy      No follow-ups on file.    This patient was examined wearing appropriate personal protective equipment.                      Dr. Delmar Rodriguez IV    12/09/24  09:57 EST

## 2024-12-09 NOTE — OUTREACH NOTE
Call Center TCM Note      Flowsheet Row Responses   [facility] patient discharged from? [facility]   Does the patient have one of the following disease processes/diagnoses(primary or secondary)? Other   TCM attempt successful? Yes   Call start time 1002   Call end time 1004   Discharge diagnosis *Back pain  Intramedullary mass   Is patient permission given to speak with other caregiver? Yes   Person spoke with today (if not patient) and relationship Mom   Meds reviewed with patient/caregiver? Yes   Is the patient having any side effects they believe may be caused by any medication additions or changes? No   Does the patient have all medications ordered at discharge? Yes   Prescription comments Ne rx's Maloxone, Oxycodone-acet 10/325, Prednisone in place   Is the patient taking all medications as directed (includes completed medication regime)? Yes   Comments Mom declines TCM APPT with PCP DARSHAN Moseley, but will keep scheduled reg ov on 12/23/2024. TCM APPT would need to be completed by 12/20/2024.   Does the patient have an appointment with their PCP within 7-14 days of discharge? No   Nursing Interventions Routed TCM call to PCP office   Home health comments Pt to attend outpt P.T.   Psychosocial issues? No   Did the patient receive a copy of their discharge instructions? Yes   Nursing interventions Reviewed instructions with patient   What is the patient's perception of their health status since discharge? Improving   Is the patient/caregiver able to teach back signs and symptoms related to disease process for when to call PCP? Yes   Is the patient/caregiver able to teach back signs and symptoms related to disease process for when to call 911? Yes   Is the patient/caregiver able to teach back the hierarchy of who to call/visit for symptoms/problems? PCP, Specialist, Home health nurse, Urgent Care, ED, 911 Yes   If the patient is a current smoker, are they able to teach back resources for cessation? Not a  smoker   TCM call completed? Yes   Wrap up additional comments D/C DX: Back pain Intramedullary mass - Per mom pt doing ok. Pt saw Neurosrgn this a.m. and will be scheduled for sx for resection of mass. Mom declines TCM APPT with PCP DARSHAN Moseley, but will keep scheduled reg ov on 12/23/2024. TCM APPT would need to be completed by 12/20/2024.   Call end time 1004            Haylee Hernandez MA    12/9/2024, 10:08 EST

## 2024-12-09 NOTE — TELEPHONE ENCOUNTER
PT HAS BEEN SEEN, TWO TUMORS (BRAIN AND SPINE) WERE SEEN, PT IS WAITING TO HAVE SURGERY SCHEDULED.

## 2024-12-12 ENCOUNTER — TELEPHONE (OUTPATIENT)
Dept: NEUROSURGERY | Facility: CLINIC | Age: 39
End: 2024-12-12
Payer: MEDICARE

## 2024-12-12 NOTE — TELEPHONE ENCOUNTER
Snajiv with [facility] called and stated that the patient's mother had called them and stated that she thought that the Surgery would already be scheduled as she thought that Dr Rodriguez had put in for an emergency authorization. Sanjiv then told her there had not been anything submitted. I did explain to Sanjiv that our surgery scheduler was out of the office currently and that I will call the patient's mother back and explain the situation.     I did call the patient's Mother and Explain that Eileen Our Surgery Scheduler has put in the Case Request. I did tell her that Eileen has been out of the office but will continue to work on getting Authorization for this when she returns. I did explain that this Surgery for her Daughter is not considered an Emergency at this time.I did also tell her that I will forward this to Eileen and she will call them when she gets back to the Office. She Verbally understood.

## 2024-12-16 DIAGNOSIS — F41.1 GENERALIZED ANXIETY DISORDER: ICD-10-CM

## 2024-12-16 DIAGNOSIS — M54.10 RADICULAR LEG PAIN: ICD-10-CM

## 2024-12-16 DIAGNOSIS — M54.42 ACUTE LEFT-SIDED LOW BACK PAIN WITH LEFT-SIDED SCIATICA: ICD-10-CM

## 2024-12-17 RX ORDER — TRAZODONE HYDROCHLORIDE 50 MG/1
50 TABLET, FILM COATED ORAL NIGHTLY
Qty: 90 TABLET | Refills: 3 | Status: SHIPPED | OUTPATIENT
Start: 2024-12-17

## 2024-12-17 RX ORDER — LORAZEPAM 1 MG/1
1 TABLET ORAL EVERY 8 HOURS PRN
Qty: 30 TABLET | Refills: 3 | Status: SHIPPED | OUTPATIENT
Start: 2024-12-17

## 2024-12-17 RX ORDER — CYCLOBENZAPRINE HCL 10 MG
10 TABLET ORAL NIGHTLY
Qty: 30 TABLET | Refills: 5 | Status: SHIPPED | OUTPATIENT
Start: 2024-12-17

## 2024-12-17 RX ORDER — HYDROCODONE BITARTRATE AND ACETAMINOPHEN 5; 325 MG/1; MG/1
1 TABLET ORAL EVERY 6 HOURS PRN
Qty: 21 TABLET | Refills: 0 | Status: SHIPPED | OUTPATIENT
Start: 2024-12-17

## 2024-12-19 ENCOUNTER — LAB (OUTPATIENT)
Dept: LAB | Facility: HOSPITAL | Age: 39
End: 2024-12-19
Payer: MEDICARE

## 2024-12-19 ENCOUNTER — HOSPITAL ENCOUNTER (OUTPATIENT)
Dept: CARDIOLOGY | Facility: HOSPITAL | Age: 39
Discharge: HOME | End: 2024-12-19
Payer: MEDICARE

## 2024-12-19 DIAGNOSIS — D35.2 PITUITARY ADENOMA: ICD-10-CM

## 2024-12-19 DIAGNOSIS — D49.7 INTRADURAL EXTRAMEDULLARY SPINAL TUMOR: ICD-10-CM

## 2024-12-19 DIAGNOSIS — R79.9 ABNORMAL FINDING OF BLOOD CHEMISTRY, UNSPECIFIED: ICD-10-CM

## 2024-12-19 DIAGNOSIS — R79.1 ABNORMAL COAGULATION PROFILE: ICD-10-CM

## 2024-12-19 LAB
ABO GROUP BLD: NORMAL
ANION GAP SERPL CALCULATED.3IONS-SCNC: 11.4 MMOL/L (ref 5–15)
BASOPHILS # BLD AUTO: 0.06 10*3/MM3 (ref 0–0.2)
BASOPHILS NFR BLD AUTO: 0.6 % (ref 0–1.5)
BILIRUB UR QL STRIP: NEGATIVE
BLD GP AB SCN SERPL QL: NEGATIVE
BUN SERPL-MCNC: 9 MG/DL (ref 6–20)
BUN/CREAT SERPL: 17.6 (ref 7–25)
CALCIUM SPEC-SCNC: 10.4 MG/DL (ref 8.6–10.5)
CHLORIDE SERPL-SCNC: 100 MMOL/L (ref 98–107)
CLARITY UR: CLEAR
CO2 SERPL-SCNC: 27.6 MMOL/L (ref 22–29)
COLOR UR: YELLOW
CREAT SERPL-MCNC: 0.51 MG/DL (ref 0.57–1)
DEPRECATED RDW RBC AUTO: 40 FL (ref 37–54)
EGFRCR SERPLBLD CKD-EPI 2021: 123.5 ML/MIN/1.73
EOSINOPHIL # BLD AUTO: 0.1 10*3/MM3 (ref 0–0.4)
EOSINOPHIL NFR BLD AUTO: 1 % (ref 0.3–6.2)
ERYTHROCYTE [DISTWIDTH] IN BLOOD BY AUTOMATED COUNT: 12.4 % (ref 12.3–15.4)
GLUCOSE SERPL-MCNC: 109 MG/DL (ref 65–99)
GLUCOSE UR STRIP-MCNC: NEGATIVE MG/DL
HBA1C MFR BLD: 6.4 % (ref 4.8–5.6)
HCT VFR BLD AUTO: 44.5 % (ref 34–46.6)
HGB BLD-MCNC: 15.2 G/DL (ref 12–15.9)
HGB UR QL STRIP.AUTO: NEGATIVE
IMM GRANULOCYTES # BLD AUTO: 0.04 10*3/MM3 (ref 0–0.05)
IMM GRANULOCYTES NFR BLD AUTO: 0.4 % (ref 0–0.5)
INR PPP: 0.91 (ref 0.9–1.1)
KETONES UR QL STRIP: NEGATIVE
LEUKOCYTE ESTERASE UR QL STRIP.AUTO: NEGATIVE
LYMPHOCYTES # BLD AUTO: 4.02 10*3/MM3 (ref 0.7–3.1)
LYMPHOCYTES NFR BLD AUTO: 41.4 % (ref 19.6–45.3)
MCH RBC QN AUTO: 30.5 PG (ref 26.6–33)
MCHC RBC AUTO-ENTMCNC: 34.2 G/DL (ref 31.5–35.7)
MCV RBC AUTO: 89.2 FL (ref 79–97)
MONOCYTES # BLD AUTO: 0.54 10*3/MM3 (ref 0.1–0.9)
MONOCYTES NFR BLD AUTO: 5.6 % (ref 5–12)
MRSA DNA SPEC QL NAA+PROBE: NORMAL
NEUTROPHILS NFR BLD AUTO: 4.95 10*3/MM3 (ref 1.7–7)
NEUTROPHILS NFR BLD AUTO: 51 % (ref 42.7–76)
NITRITE UR QL STRIP: NEGATIVE
NRBC BLD AUTO-RTO: 0 /100 WBC (ref 0–0.2)
PH UR STRIP.AUTO: 8 [PH] (ref 5–8)
PLATELET # BLD AUTO: 329 10*3/MM3 (ref 140–450)
PMV BLD AUTO: 10.2 FL (ref 6–12)
POTASSIUM SERPL-SCNC: 4 MMOL/L (ref 3.5–5.2)
PROLACTIN SERPL-MCNC: 26.9 NG/ML (ref 4.79–23.3)
PROT UR QL STRIP: NEGATIVE
PROTHROMBIN TIME: 12.3 SECONDS (ref 11.7–14.2)
QT INTERVAL: 350 MS
QTC INTERVAL: 430 MS
RBC # BLD AUTO: 4.99 10*6/MM3 (ref 3.77–5.28)
RH BLD: POSITIVE
SODIUM SERPL-SCNC: 139 MMOL/L (ref 136–145)
SP GR UR STRIP: 1.01 (ref 1–1.03)
T&S EXPIRATION DATE: NORMAL
UROBILINOGEN UR QL STRIP: NORMAL
WBC NRBC COR # BLD AUTO: 9.71 10*3/MM3 (ref 3.4–10.8)

## 2024-12-19 PROCEDURE — 86900 BLOOD TYPING SEROLOGIC ABO: CPT

## 2024-12-19 PROCEDURE — 86901 BLOOD TYPING SEROLOGIC RH(D): CPT

## 2024-12-19 PROCEDURE — 93005 ELECTROCARDIOGRAM TRACING: CPT | Performed by: NEUROLOGICAL SURGERY

## 2024-12-19 PROCEDURE — 85610 PROTHROMBIN TIME: CPT

## 2024-12-19 PROCEDURE — 83036 HEMOGLOBIN GLYCOSYLATED A1C: CPT

## 2024-12-19 PROCEDURE — 84146 ASSAY OF PROLACTIN: CPT

## 2024-12-19 PROCEDURE — 85025 COMPLETE CBC W/AUTO DIFF WBC: CPT

## 2024-12-19 PROCEDURE — 36415 COLL VENOUS BLD VENIPUNCTURE: CPT

## 2024-12-19 PROCEDURE — 80048 BASIC METABOLIC PNL TOTAL CA: CPT

## 2024-12-19 PROCEDURE — 81003 URINALYSIS AUTO W/O SCOPE: CPT

## 2024-12-19 PROCEDURE — 87641 MR-STAPH DNA AMP PROBE: CPT

## 2024-12-19 PROCEDURE — 86850 RBC ANTIBODY SCREEN: CPT

## 2024-12-23 ENCOUNTER — LAB (OUTPATIENT)
Dept: LAB | Facility: HOSPITAL | Age: 39
End: 2024-12-23
Payer: MEDICARE

## 2024-12-23 DIAGNOSIS — D35.2 PITUITARY ADENOMA: ICD-10-CM

## 2024-12-23 PROCEDURE — 36415 COLL VENOUS BLD VENIPUNCTURE: CPT

## 2024-12-23 PROCEDURE — 82024 ASSAY OF ACTH: CPT

## 2024-12-24 LAB — ACTH PLAS-MCNC: 12.5 PG/ML (ref 7.2–63.3)

## 2024-12-30 ENCOUNTER — ANESTHESIA EVENT (OUTPATIENT)
Dept: PERIOP | Facility: HOSPITAL | Age: 39
End: 2024-12-30
Payer: MEDICARE

## 2024-12-30 ENCOUNTER — APPOINTMENT (OUTPATIENT)
Dept: GENERAL RADIOLOGY | Facility: HOSPITAL | Age: 39
End: 2024-12-30
Payer: MEDICARE

## 2024-12-30 ENCOUNTER — ANESTHESIA (OUTPATIENT)
Dept: PERIOP | Facility: HOSPITAL | Age: 39
End: 2024-12-30
Payer: MEDICARE

## 2024-12-30 ENCOUNTER — HOSPITAL ENCOUNTER (INPATIENT)
Facility: HOSPITAL | Age: 39
LOS: 3 days | Discharge: HOME | End: 2025-01-02
Attending: NEUROLOGICAL SURGERY | Admitting: NEUROLOGICAL SURGERY
Payer: MEDICARE

## 2024-12-30 DIAGNOSIS — D49.7 INTRADURAL EXTRAMEDULLARY SPINAL TUMOR: ICD-10-CM

## 2024-12-30 PROCEDURE — 25010000002 MIDAZOLAM PER 1 MG: Performed by: NURSE ANESTHETIST, CERTIFIED REGISTERED

## 2024-12-30 PROCEDURE — 88341 IMHCHEM/IMCYTCHM EA ADD ANTB: CPT | Performed by: NEUROLOGICAL SURGERY

## 2024-12-30 PROCEDURE — 25010000002 HYDROMORPHONE 1 MG/ML SOLUTION: Performed by: NURSE ANESTHETIST, CERTIFIED REGISTERED

## 2024-12-30 PROCEDURE — 25010000002 FENTANYL CITRATE (PF) 50 MCG/ML SOLUTION: Performed by: NURSE ANESTHETIST, CERTIFIED REGISTERED

## 2024-12-30 PROCEDURE — 25010000002 CEFAZOLIN PER 500 MG: Performed by: NEUROLOGICAL SURGERY

## 2024-12-30 PROCEDURE — 76000 FLUOROSCOPY <1 HR PHYS/QHP: CPT

## 2024-12-30 PROCEDURE — 25010000002 ONDANSETRON PER 1 MG: Performed by: ANESTHESIOLOGY

## 2024-12-30 PROCEDURE — 63282 BX/EXC IDRL SPINE LESN LMBR: CPT | Performed by: NEUROLOGICAL SURGERY

## 2024-12-30 PROCEDURE — 25010000002 PROPOFOL 10 MG/ML EMULSION: Performed by: NURSE ANESTHETIST, CERTIFIED REGISTERED

## 2024-12-30 PROCEDURE — 25010000002 MAGNESIUM SULFATE PER 500 MG OF MAGNESIUM: Performed by: NURSE ANESTHETIST, CERTIFIED REGISTERED

## 2024-12-30 PROCEDURE — 25010000002 HYDROMORPHONE 1 MG/ML SOLUTION

## 2024-12-30 PROCEDURE — 25010000002 DEXAMETHASONE SODIUM PHOSPHATE 20 MG/5ML SOLUTION: Performed by: NURSE ANESTHETIST, CERTIFIED REGISTERED

## 2024-12-30 PROCEDURE — C1889 IMPLANT/INSERT DEVICE, NOC: HCPCS | Performed by: NEUROLOGICAL SURGERY

## 2024-12-30 PROCEDURE — 72100 X-RAY EXAM L-S SPINE 2/3 VWS: CPT

## 2024-12-30 PROCEDURE — 25810000003 LACTATED RINGERS PER 1000 ML: Performed by: NEUROLOGICAL SURGERY

## 2024-12-30 PROCEDURE — 25010000002 SUGAMMADEX 200 MG/2ML SOLUTION: Performed by: NURSE ANESTHETIST, CERTIFIED REGISTERED

## 2024-12-30 PROCEDURE — 25810000003 LACTATED RINGERS PER 1000 ML: Performed by: NURSE ANESTHETIST, CERTIFIED REGISTERED

## 2024-12-30 PROCEDURE — 88331 PATH CONSLTJ SURG 1 BLK 1SPC: CPT | Performed by: PATHOLOGY

## 2024-12-30 PROCEDURE — 88307 TISSUE EXAM BY PATHOLOGIST: CPT | Performed by: NEUROLOGICAL SURGERY

## 2024-12-30 PROCEDURE — 25010000002 LIDOCAINE PF 2% 2 % SOLUTION: Performed by: NURSE ANESTHETIST, CERTIFIED REGISTERED

## 2024-12-30 PROCEDURE — 63282 BX/EXC IDRL SPINE LESN LMBR: CPT

## 2024-12-30 PROCEDURE — 00BY0ZZ EXCISION OF LUMBAR SPINAL CORD, OPEN APPROACH: ICD-10-PCS | Performed by: NEUROLOGICAL SURGERY

## 2024-12-30 PROCEDURE — 25010000002 CEFAZOLIN PER 500 MG

## 2024-12-30 PROCEDURE — 25010000002 LIDOCAINE 1% - EPINEPHRINE 1:100000 1 %-1:100000 SOLUTION: Performed by: NEUROLOGICAL SURGERY

## 2024-12-30 PROCEDURE — 88342 IMHCHEM/IMCYTCHM 1ST ANTB: CPT | Performed by: NEUROLOGICAL SURGERY

## 2024-12-30 DEVICE — DURAGEN® PLUS DURAL REGENERATION MATRIX , 4 IN X 5 IN
Type: IMPLANTABLE DEVICE | Site: SPINE LUMBAR | Status: FUNCTIONAL
Brand: DURAGEN® PLUS

## 2024-12-30 DEVICE — FLOSEAL WITH RECOTHROM - 10ML.
Type: IMPLANTABLE DEVICE | Site: SPINE LUMBAR | Status: FUNCTIONAL
Brand: FLOSEAL HEMOSTATIC MATRIX

## 2024-12-30 DEVICE — DEV CONTRL TISS STRATAFIX SPIRAL MNCRYL UD 3/0 PLS 30CM: Type: IMPLANTABLE DEVICE | Site: SPINE LUMBAR | Status: FUNCTIONAL

## 2024-12-30 DEVICE — DEV WND/CLS CONTRL TISS STRATAFIX SYMM PDS PLS CTX 60CM VIL: Type: IMPLANTABLE DEVICE | Site: SPINE LUMBAR | Status: FUNCTIONAL

## 2024-12-30 RX ORDER — ONDANSETRON 2 MG/ML
4 INJECTION INTRAMUSCULAR; INTRAVENOUS EVERY 6 HOURS PRN
Status: DISCONTINUED | OUTPATIENT
Start: 2024-12-30 | End: 2025-01-02 | Stop reason: HOSPADM

## 2024-12-30 RX ORDER — PHENYLEPHRINE HCL IN 0.9% NACL 1 MG/10 ML
SYRINGE (ML) INTRAVENOUS AS NEEDED
Status: DISCONTINUED | OUTPATIENT
Start: 2024-12-30 | End: 2024-12-30 | Stop reason: SURG

## 2024-12-30 RX ORDER — MIDAZOLAM HYDROCHLORIDE 1 MG/ML
INJECTION, SOLUTION INTRAMUSCULAR; INTRAVENOUS AS NEEDED
Status: DISCONTINUED | OUTPATIENT
Start: 2024-12-30 | End: 2024-12-30 | Stop reason: SURG

## 2024-12-30 RX ORDER — ONDANSETRON 2 MG/ML
4 INJECTION INTRAMUSCULAR; INTRAVENOUS ONCE
Status: COMPLETED | OUTPATIENT
Start: 2024-12-30 | End: 2024-12-30

## 2024-12-30 RX ORDER — ROCURONIUM BROMIDE 10 MG/ML
INJECTION, SOLUTION INTRAVENOUS AS NEEDED
Status: DISCONTINUED | OUTPATIENT
Start: 2024-12-30 | End: 2024-12-30 | Stop reason: SURG

## 2024-12-30 RX ORDER — ATORVASTATIN CALCIUM 40 MG/1
80 TABLET, FILM COATED ORAL DAILY
Status: DISCONTINUED | OUTPATIENT
Start: 2024-12-30 | End: 2025-01-02 | Stop reason: HOSPADM

## 2024-12-30 RX ORDER — GABAPENTIN 100 MG/1
100 CAPSULE ORAL 3 TIMES DAILY
Status: DISCONTINUED | OUTPATIENT
Start: 2024-12-30 | End: 2025-01-02 | Stop reason: HOSPADM

## 2024-12-30 RX ORDER — NYSTATIN 100000 [USP'U]/G
POWDER TOPICAL EVERY 12 HOURS SCHEDULED
Status: DISCONTINUED | OUTPATIENT
Start: 2024-12-30 | End: 2025-01-02 | Stop reason: HOSPADM

## 2024-12-30 RX ORDER — SERTRALINE HYDROCHLORIDE 100 MG/1
100 TABLET, FILM COATED ORAL DAILY
Status: DISCONTINUED | OUTPATIENT
Start: 2024-12-30 | End: 2025-01-02 | Stop reason: HOSPADM

## 2024-12-30 RX ORDER — ENOXAPARIN SODIUM 100 MG/ML
40 INJECTION SUBCUTANEOUS EVERY 12 HOURS
Status: DISCONTINUED | OUTPATIENT
Start: 2024-12-31 | End: 2025-01-02 | Stop reason: HOSPADM

## 2024-12-30 RX ORDER — ACETAMINOPHEN 325 MG/1
650 TABLET ORAL ONCE AS NEEDED
Status: DISCONTINUED | OUTPATIENT
Start: 2024-12-30 | End: 2024-12-30 | Stop reason: HOSPADM

## 2024-12-30 RX ORDER — ONDANSETRON 4 MG/1
4 TABLET, ORALLY DISINTEGRATING ORAL EVERY 6 HOURS PRN
Status: DISCONTINUED | OUTPATIENT
Start: 2024-12-30 | End: 2025-01-02 | Stop reason: HOSPADM

## 2024-12-30 RX ORDER — TRAZODONE HYDROCHLORIDE 50 MG/1
50 TABLET, FILM COATED ORAL NIGHTLY
Status: DISCONTINUED | OUTPATIENT
Start: 2024-12-30 | End: 2025-01-02 | Stop reason: HOSPADM

## 2024-12-30 RX ORDER — ACETAMINOPHEN 325 MG/1
650 TABLET ORAL EVERY 4 HOURS PRN
Status: DISCONTINUED | OUTPATIENT
Start: 2024-12-30 | End: 2025-01-02 | Stop reason: HOSPADM

## 2024-12-30 RX ORDER — POLYETHYLENE GLYCOL 3350 17 G/17G
17 POWDER, FOR SOLUTION ORAL DAILY PRN
Status: DISCONTINUED | OUTPATIENT
Start: 2024-12-30 | End: 2025-01-02 | Stop reason: HOSPADM

## 2024-12-30 RX ORDER — FLUMAZENIL 0.1 MG/ML
0.2 INJECTION INTRAVENOUS AS NEEDED
Status: DISCONTINUED | OUTPATIENT
Start: 2024-12-30 | End: 2024-12-30 | Stop reason: HOSPADM

## 2024-12-30 RX ORDER — MAGNESIUM SULFATE HEPTAHYDRATE 500 MG/ML
INJECTION, SOLUTION INTRAMUSCULAR; INTRAVENOUS AS NEEDED
Status: DISCONTINUED | OUTPATIENT
Start: 2024-12-30 | End: 2024-12-30 | Stop reason: SURG

## 2024-12-30 RX ORDER — LORAZEPAM 1 MG/1
1 TABLET ORAL EVERY 6 HOURS SCHEDULED
Status: DISCONTINUED | OUTPATIENT
Start: 2024-12-30 | End: 2025-01-02 | Stop reason: HOSPADM

## 2024-12-30 RX ORDER — FENTANYL CITRATE 50 UG/ML
INJECTION, SOLUTION INTRAMUSCULAR; INTRAVENOUS AS NEEDED
Status: DISCONTINUED | OUTPATIENT
Start: 2024-12-30 | End: 2024-12-30 | Stop reason: SURG

## 2024-12-30 RX ORDER — KETAMINE HCL IN NACL, ISO-OSM 100MG/10ML
SYRINGE (ML) INJECTION AS NEEDED
Status: DISCONTINUED | OUTPATIENT
Start: 2024-12-30 | End: 2024-12-30 | Stop reason: SURG

## 2024-12-30 RX ORDER — PANTOPRAZOLE SODIUM 40 MG/1
40 TABLET, DELAYED RELEASE ORAL DAILY
Status: DISCONTINUED | OUTPATIENT
Start: 2024-12-30 | End: 2025-01-02 | Stop reason: HOSPADM

## 2024-12-30 RX ORDER — DIPHENHYDRAMINE HYDROCHLORIDE 50 MG/ML
12.5 INJECTION INTRAMUSCULAR; INTRAVENOUS
Status: DISCONTINUED | OUTPATIENT
Start: 2024-12-30 | End: 2024-12-30 | Stop reason: HOSPADM

## 2024-12-30 RX ORDER — SODIUM CHLORIDE, SODIUM LACTATE, POTASSIUM CHLORIDE, CALCIUM CHLORIDE 600; 310; 30; 20 MG/100ML; MG/100ML; MG/100ML; MG/100ML
INJECTION, SOLUTION INTRAVENOUS CONTINUOUS PRN
Status: DISCONTINUED | OUTPATIENT
Start: 2024-12-30 | End: 2024-12-30 | Stop reason: SURG

## 2024-12-30 RX ORDER — FENTANYL CITRATE 50 UG/ML
50 INJECTION, SOLUTION INTRAMUSCULAR; INTRAVENOUS
Status: DISCONTINUED | OUTPATIENT
Start: 2024-12-30 | End: 2024-12-30 | Stop reason: HOSPADM

## 2024-12-30 RX ORDER — BISACODYL 10 MG
10 SUPPOSITORY, RECTAL RECTAL DAILY PRN
Status: DISCONTINUED | OUTPATIENT
Start: 2024-12-30 | End: 2025-01-02 | Stop reason: HOSPADM

## 2024-12-30 RX ORDER — BISACODYL 5 MG/1
5 TABLET, DELAYED RELEASE ORAL DAILY PRN
Status: DISCONTINUED | OUTPATIENT
Start: 2024-12-30 | End: 2025-01-02 | Stop reason: HOSPADM

## 2024-12-30 RX ORDER — PROPOFOL 10 MG/ML
VIAL (ML) INTRAVENOUS AS NEEDED
Status: DISCONTINUED | OUTPATIENT
Start: 2024-12-30 | End: 2024-12-30 | Stop reason: SURG

## 2024-12-30 RX ORDER — ACETAMINOPHEN 500 MG
1000 TABLET ORAL ONCE
Status: COMPLETED | OUTPATIENT
Start: 2024-12-30 | End: 2024-12-30

## 2024-12-30 RX ORDER — LIDOCAINE HYDROCHLORIDE 20 MG/ML
INJECTION, SOLUTION EPIDURAL; INFILTRATION; INTRACAUDAL; PERINEURAL AS NEEDED
Status: DISCONTINUED | OUTPATIENT
Start: 2024-12-30 | End: 2024-12-30 | Stop reason: SURG

## 2024-12-30 RX ORDER — OXYCODONE HYDROCHLORIDE 5 MG/1
5 TABLET ORAL ONCE
Status: COMPLETED | OUTPATIENT
Start: 2024-12-30 | End: 2024-12-30

## 2024-12-30 RX ORDER — HYDROCODONE BITARTRATE AND ACETAMINOPHEN 5; 325 MG/1; MG/1
2 TABLET ORAL EVERY 4 HOURS PRN
Status: DISCONTINUED | OUTPATIENT
Start: 2024-12-30 | End: 2025-01-02 | Stop reason: HOSPADM

## 2024-12-30 RX ORDER — LIDOCAINE HYDROCHLORIDE 10 MG/ML
0.5 INJECTION, SOLUTION EPIDURAL; INFILTRATION; INTRACAUDAL; PERINEURAL ONCE AS NEEDED
Status: DISCONTINUED | OUTPATIENT
Start: 2024-12-30 | End: 2024-12-30 | Stop reason: HOSPADM

## 2024-12-30 RX ORDER — LIDOCAINE HYDROCHLORIDE AND EPINEPHRINE 10; 10 MG/ML; UG/ML
INJECTION, SOLUTION INFILTRATION; PERINEURAL AS NEEDED
Status: DISCONTINUED | OUTPATIENT
Start: 2024-12-30 | End: 2024-12-30 | Stop reason: HOSPADM

## 2024-12-30 RX ORDER — SODIUM CHLORIDE 0.9 % (FLUSH) 0.9 %
10 SYRINGE (ML) INJECTION AS NEEDED
Status: DISCONTINUED | OUTPATIENT
Start: 2024-12-30 | End: 2024-12-30 | Stop reason: HOSPADM

## 2024-12-30 RX ORDER — CYCLOBENZAPRINE HCL 10 MG
10 TABLET ORAL 3 TIMES DAILY PRN
Status: DISCONTINUED | OUTPATIENT
Start: 2024-12-30 | End: 2025-01-02 | Stop reason: HOSPADM

## 2024-12-30 RX ORDER — DEXAMETHASONE SODIUM PHOSPHATE 4 MG/ML
INJECTION, SOLUTION INTRA-ARTICULAR; INTRALESIONAL; INTRAMUSCULAR; INTRAVENOUS; SOFT TISSUE AS NEEDED
Status: DISCONTINUED | OUTPATIENT
Start: 2024-12-30 | End: 2024-12-30 | Stop reason: SURG

## 2024-12-30 RX ORDER — ONDANSETRON 2 MG/ML
4 INJECTION INTRAMUSCULAR; INTRAVENOUS ONCE AS NEEDED
Status: DISCONTINUED | OUTPATIENT
Start: 2024-12-30 | End: 2024-12-30 | Stop reason: HOSPADM

## 2024-12-30 RX ORDER — SODIUM CHLORIDE, SODIUM LACTATE, POTASSIUM CHLORIDE, CALCIUM CHLORIDE 600; 310; 30; 20 MG/100ML; MG/100ML; MG/100ML; MG/100ML
1000 INJECTION, SOLUTION INTRAVENOUS ONCE
Status: COMPLETED | OUTPATIENT
Start: 2024-12-30 | End: 2024-12-30

## 2024-12-30 RX ORDER — LABETALOL HYDROCHLORIDE 5 MG/ML
5 INJECTION, SOLUTION INTRAVENOUS
Status: DISCONTINUED | OUTPATIENT
Start: 2024-12-30 | End: 2024-12-30 | Stop reason: HOSPADM

## 2024-12-30 RX ORDER — SODIUM CHLORIDE 0.9 % (FLUSH) 0.9 %
10 SYRINGE (ML) INJECTION EVERY 12 HOURS SCHEDULED
Status: DISCONTINUED | OUTPATIENT
Start: 2024-12-30 | End: 2025-01-02 | Stop reason: HOSPADM

## 2024-12-30 RX ORDER — EPHEDRINE SULFATE 5 MG/ML
5 INJECTION INTRAVENOUS ONCE AS NEEDED
Status: DISCONTINUED | OUTPATIENT
Start: 2024-12-30 | End: 2024-12-30 | Stop reason: HOSPADM

## 2024-12-30 RX ORDER — ACETAMINOPHEN 650 MG/1
650 SUPPOSITORY RECTAL EVERY 4 HOURS PRN
Status: DISCONTINUED | OUTPATIENT
Start: 2024-12-30 | End: 2025-01-02 | Stop reason: HOSPADM

## 2024-12-30 RX ORDER — DEXMEDETOMIDINE HYDROCHLORIDE 100 UG/ML
INJECTION, SOLUTION INTRAVENOUS AS NEEDED
Status: DISCONTINUED | OUTPATIENT
Start: 2024-12-30 | End: 2024-12-30 | Stop reason: SURG

## 2024-12-30 RX ORDER — NALOXONE HCL 0.4 MG/ML
0.4 VIAL (ML) INJECTION AS NEEDED
Status: DISCONTINUED | OUTPATIENT
Start: 2024-12-30 | End: 2024-12-30 | Stop reason: HOSPADM

## 2024-12-30 RX ORDER — SODIUM CHLORIDE 0.9 % (FLUSH) 0.9 %
10 SYRINGE (ML) INJECTION AS NEEDED
Status: DISCONTINUED | OUTPATIENT
Start: 2024-12-30 | End: 2025-01-02 | Stop reason: HOSPADM

## 2024-12-30 RX ORDER — ACETAMINOPHEN 160 MG/5ML
650 SOLUTION ORAL EVERY 4 HOURS PRN
Status: DISCONTINUED | OUTPATIENT
Start: 2024-12-30 | End: 2025-01-02 | Stop reason: HOSPADM

## 2024-12-30 RX ORDER — SODIUM CHLORIDE 9 MG/ML
40 INJECTION, SOLUTION INTRAVENOUS AS NEEDED
Status: DISCONTINUED | OUTPATIENT
Start: 2024-12-30 | End: 2025-01-02 | Stop reason: HOSPADM

## 2024-12-30 RX ORDER — OXYCODONE HYDROCHLORIDE 5 MG/1
5 TABLET ORAL ONCE AS NEEDED
Status: DISCONTINUED | OUTPATIENT
Start: 2024-12-30 | End: 2024-12-30 | Stop reason: HOSPADM

## 2024-12-30 RX ORDER — IPRATROPIUM BROMIDE AND ALBUTEROL SULFATE 2.5; .5 MG/3ML; MG/3ML
3 SOLUTION RESPIRATORY (INHALATION) ONCE AS NEEDED
Status: DISCONTINUED | OUTPATIENT
Start: 2024-12-30 | End: 2024-12-30 | Stop reason: HOSPADM

## 2024-12-30 RX ORDER — NALOXONE HCL 0.4 MG/ML
0.4 VIAL (ML) INJECTION
Status: DISCONTINUED | OUTPATIENT
Start: 2024-12-30 | End: 2025-01-02 | Stop reason: HOSPADM

## 2024-12-30 RX ORDER — MIDAZOLAM HYDROCHLORIDE 1 MG/ML
2 INJECTION, SOLUTION INTRAMUSCULAR; INTRAVENOUS ONCE
Status: DISCONTINUED | OUTPATIENT
Start: 2024-12-30 | End: 2024-12-30 | Stop reason: HOSPADM

## 2024-12-30 RX ORDER — HYDRALAZINE HYDROCHLORIDE 20 MG/ML
5 INJECTION INTRAMUSCULAR; INTRAVENOUS
Status: DISCONTINUED | OUTPATIENT
Start: 2024-12-30 | End: 2024-12-30 | Stop reason: HOSPADM

## 2024-12-30 RX ORDER — AMOXICILLIN 250 MG
2 CAPSULE ORAL 2 TIMES DAILY PRN
Status: DISCONTINUED | OUTPATIENT
Start: 2024-12-30 | End: 2025-01-02 | Stop reason: HOSPADM

## 2024-12-30 RX ADMIN — TRAZODONE HYDROCHLORIDE 50 MG: 50 TABLET ORAL at 21:51

## 2024-12-30 RX ADMIN — FENTANYL CITRATE 50 MCG: 50 INJECTION, SOLUTION INTRAMUSCULAR; INTRAVENOUS at 07:50

## 2024-12-30 RX ADMIN — CEFAZOLIN 2000 MG: 2 INJECTION, POWDER, FOR SOLUTION INTRAMUSCULAR; INTRAVENOUS at 15:48

## 2024-12-30 RX ADMIN — Medication 10 ML: at 21:55

## 2024-12-30 RX ADMIN — MIDAZOLAM 2 MG: 1 INJECTION INTRAMUSCULAR; INTRAVENOUS at 07:32

## 2024-12-30 RX ADMIN — Medication 100 MCG: at 08:51

## 2024-12-30 RX ADMIN — SUGAMMADEX 400 MG: 100 INJECTION, SOLUTION INTRAVENOUS at 07:52

## 2024-12-30 RX ADMIN — SODIUM CHLORIDE, POTASSIUM CHLORIDE, SODIUM LACTATE AND CALCIUM CHLORIDE 1000 ML: 600; 310; 30; 20 INJECTION, SOLUTION INTRAVENOUS at 06:30

## 2024-12-30 RX ADMIN — Medication 100 MCG: at 09:33

## 2024-12-30 RX ADMIN — SODIUM CHLORIDE, SODIUM LACTATE, POTASSIUM CHLORIDE, AND CALCIUM CHLORIDE: .6; .31; .03; .02 INJECTION, SOLUTION INTRAVENOUS at 07:29

## 2024-12-30 RX ADMIN — LAMOTRIGINE 150 MG: 100 TABLET ORAL at 13:13

## 2024-12-30 RX ADMIN — DEXMEDETOMIDINE HYDROCHLORIDE 5 MCG: 100 INJECTION, SOLUTION, CONCENTRATE INTRAVENOUS at 09:14

## 2024-12-30 RX ADMIN — LORAZEPAM 1 MG: 1 TABLET ORAL at 13:14

## 2024-12-30 RX ADMIN — GABAPENTIN 100 MG: 100 CAPSULE ORAL at 21:51

## 2024-12-30 RX ADMIN — PROPOFOL 50 MG: 10 INJECTION, EMULSION INTRAVENOUS at 09:13

## 2024-12-30 RX ADMIN — DEXMEDETOMIDINE HYDROCHLORIDE 5 MCG: 100 INJECTION, SOLUTION, CONCENTRATE INTRAVENOUS at 08:28

## 2024-12-30 RX ADMIN — LAMOTRIGINE 150 MG: 100 TABLET ORAL at 21:51

## 2024-12-30 RX ADMIN — Medication 20 MG: at 07:40

## 2024-12-30 RX ADMIN — HYDROMORPHONE HYDROCHLORIDE 0.25 MG: 1 INJECTION, SOLUTION INTRAMUSCULAR; INTRAVENOUS; SUBCUTANEOUS at 13:04

## 2024-12-30 RX ADMIN — HYDROMORPHONE HYDROCHLORIDE 0.5 MG: 1 INJECTION, SOLUTION INTRAMUSCULAR; INTRAVENOUS; SUBCUTANEOUS at 08:09

## 2024-12-30 RX ADMIN — HYDROCODONE BITARTRATE AND ACETAMINOPHEN 2 TABLET: 5; 325 TABLET ORAL at 15:48

## 2024-12-30 RX ADMIN — PANTOPRAZOLE SODIUM 40 MG: 40 TABLET, DELAYED RELEASE ORAL at 13:14

## 2024-12-30 RX ADMIN — Medication 100 MCG: at 09:45

## 2024-12-30 RX ADMIN — LIDOCAINE HYDROCHLORIDE 100 MG: 20 INJECTION, SOLUTION EPIDURAL; INFILTRATION; INTRACAUDAL; PERINEURAL at 07:32

## 2024-12-30 RX ADMIN — FENTANYL CITRATE 50 MCG: 50 INJECTION, SOLUTION INTRAMUSCULAR; INTRAVENOUS at 10:21

## 2024-12-30 RX ADMIN — Medication 10 ML: at 13:14

## 2024-12-30 RX ADMIN — ATORVASTATIN CALCIUM 80 MG: 40 TABLET, FILM COATED ORAL at 13:14

## 2024-12-30 RX ADMIN — GABAPENTIN 100 MG: 100 CAPSULE ORAL at 15:48

## 2024-12-30 RX ADMIN — MAGNESIUM SULFATE HEPTAHYDRATE 1 G: 500 INJECTION, SOLUTION INTRAMUSCULAR; INTRAVENOUS at 07:50

## 2024-12-30 RX ADMIN — ONDANSETRON 4 MG: 2 INJECTION INTRAMUSCULAR; INTRAVENOUS at 07:14

## 2024-12-30 RX ADMIN — HYDROMORPHONE HYDROCHLORIDE 0.5 MG: 1 INJECTION, SOLUTION INTRAMUSCULAR; INTRAVENOUS; SUBCUTANEOUS at 11:17

## 2024-12-30 RX ADMIN — OXYCODONE 5 MG: 5 TABLET ORAL at 07:10

## 2024-12-30 RX ADMIN — SERTRALINE 100 MG: 100 TABLET, FILM COATED ORAL at 13:14

## 2024-12-30 RX ADMIN — PROPOFOL 25 MCG/KG/MIN: 10 INJECTION, EMULSION INTRAVENOUS at 07:37

## 2024-12-30 RX ADMIN — PROPOFOL 200 MG: 10 INJECTION, EMULSION INTRAVENOUS at 07:32

## 2024-12-30 RX ADMIN — HYDROMORPHONE HYDROCHLORIDE 0.5 MG: 1 INJECTION, SOLUTION INTRAMUSCULAR; INTRAVENOUS; SUBCUTANEOUS at 08:39

## 2024-12-30 RX ADMIN — DEXAMETHASONE SODIUM PHOSPHATE 8 MG: 4 INJECTION, SOLUTION INTRAMUSCULAR; INTRAVENOUS at 07:40

## 2024-12-30 RX ADMIN — ROCURONIUM BROMIDE 50 MG: 10 INJECTION, SOLUTION INTRAVENOUS at 07:32

## 2024-12-30 RX ADMIN — HYDROCODONE BITARTRATE AND ACETAMINOPHEN 2 TABLET: 5; 325 TABLET ORAL at 19:55

## 2024-12-30 RX ADMIN — FENTANYL CITRATE 50 MCG: 50 INJECTION, SOLUTION INTRAMUSCULAR; INTRAVENOUS at 09:13

## 2024-12-30 RX ADMIN — LORAZEPAM 1 MG: 1 TABLET ORAL at 17:47

## 2024-12-30 RX ADMIN — ACETAMINOPHEN 1000 MG: 500 TABLET, FILM COATED ORAL at 07:10

## 2024-12-30 RX ADMIN — FENTANYL CITRATE 50 MCG: 50 INJECTION, SOLUTION INTRAMUSCULAR; INTRAVENOUS at 11:00

## 2024-12-30 RX ADMIN — FENTANYL CITRATE 50 MCG: 50 INJECTION, SOLUTION INTRAMUSCULAR; INTRAVENOUS at 07:32

## 2024-12-30 RX ADMIN — Medication 100 MCG: at 10:03

## 2024-12-30 RX ADMIN — PROPOFOL 50 MG: 10 INJECTION, EMULSION INTRAVENOUS at 08:44

## 2024-12-30 RX ADMIN — Medication 100 MCG: at 09:24

## 2024-12-30 RX ADMIN — CEFAZOLIN 2 G: 2 INJECTION, POWDER, FOR SOLUTION INTRAMUSCULAR; INTRAVENOUS at 07:22

## 2024-12-30 RX ADMIN — SODIUM CHLORIDE, SODIUM LACTATE, POTASSIUM CHLORIDE, AND CALCIUM CHLORIDE: .6; .31; .03; .02 INJECTION, SOLUTION INTRAVENOUS at 09:33

## 2024-12-30 RX ADMIN — CYCLOBENZAPRINE 10 MG: 10 TABLET, FILM COATED ORAL at 17:47

## 2024-12-30 RX ADMIN — Medication 100 MCG: at 08:59

## 2024-12-30 RX ADMIN — HYDROMORPHONE HYDROCHLORIDE 0.25 MG: 1 INJECTION, SOLUTION INTRAMUSCULAR; INTRAVENOUS; SUBCUTANEOUS at 17:47

## 2024-12-30 RX ADMIN — Medication 5 MG: at 08:25

## 2024-12-30 NOTE — PLAN OF CARE
Goal Outcome Evaluation:                                          New admission to [facility], lying flat for 24 hours. Complaints of pain, treated per MAR. Mom at bedside. Able to make needs known, plan of care ongoing.

## 2024-12-30 NOTE — ANESTHESIA POSTPROCEDURE EVALUATION
Patient: Elyssa Olivarez    Procedure Summary       Date: 12/30/24 Room / Location: [facility] OR 02 / [facility] MAIN OR    Anesthesia Start: 0729 Anesthesia Stop: 1032    Procedure: Lumbar 4 to sacral 1 laminectomy for resection of intradural tumor (Spine Lumbar) Diagnosis:       Intradural extramedullary spinal tumor      (Intradural extramedullary spinal tumor [D49.7])    Surgeons: Delmar Rodriguez IV, MD Provider: Erin Adam MD    Anesthesia Type: general, ERAS Protocol ASA Status: 3            Anesthesia Type: general, ERAS Protocol    Vitals  Vitals Value Taken Time   /84 12/30/24 1128   Temp 97.6 °F (36.4 °C) 12/30/24 1128   Pulse 104 12/30/24 1130   Resp 12 12/30/24 1128   SpO2 96 % 12/30/24 1130   Vitals shown include unfiled device data.        Post Anesthesia Care and Evaluation    Patient location during evaluation: PACU  Patient participation: complete - patient participated  Level of consciousness: awake and alert  Pain management: satisfactory to patient    Airway patency: patent  Anesthetic complications: No anesthetic complications  PONV Status: none  Cardiovascular status: acceptable  Respiratory status: acceptable  Hydration status: acceptable

## 2024-12-30 NOTE — INTERVAL H&P NOTE
H&P reviewed. The patient was examined and there are no changes to the H&P.  Patient and her family understand the risks of surgery as well as increased risk due to medical comorbidities including morbid obesity and autism among other medical comorbidities and they have agreed for the patient to undergo the procedure

## 2024-12-30 NOTE — ANESTHESIA PREPROCEDURE EVALUATION
Anesthesia Evaluation     Patient summary reviewed and Nursing notes reviewed   no history of anesthetic complications:   NPO Solid Status: > 8 hours  NPO Liquid Status: > 8 hours           Airway   Dental      Pulmonary    (-) not a smoker  Cardiovascular     (+) hyperlipidemia      Neuro/Psych  (+) headaches, numbness, psychiatric history  GI/Hepatic/Renal/Endo    (+) morbid obesity, GERD, thyroid problem hypothyroidism    Musculoskeletal     (+) back pain  Abdominal    Substance History      OB/GYN          Other      history of cancer                  Anesthesia Plan    ASA 3     general and ERAS Protocol   total IV anesthesia  intravenous induction     Anesthetic plan, risks, benefits, and alternatives have been provided, discussed and informed consent has been obtained with: patient.    Plan discussed with CRNA.    CODE STATUS:

## 2024-12-30 NOTE — ANESTHESIA PROCEDURE NOTES
Airway  Urgency: elective    Date/Time: 12/30/2024 7:37 AM  Airway not difficult    General Information and Staff    Patient location during procedure: OR  Anesthesiologist: Erin Adam MD  CRNA/CAA: Dayanara Pena CRNA    Indications and Patient Condition  Indications for airway management: airway protection    Preoxygenated: yes (pt pre-O2 with 100% O2)  Mask difficulty assessment: 2 - vent by mask + OA or adjuvant +/- NMBA (easy BMV )    Final Airway Details  Final airway type: endotracheal airway      Successful airway: ETT  Cuffed: yes   Successful intubation technique: video laryngoscopy  Facilitating devices/methods: intubating stylet  Endotracheal tube insertion site: oral  Blade: Baldwin  Blade size: 4  ETT size (mm): 7.0  Cormack-Lehane Classification: grade I - full view of glottis  Placement verified by: chest auscultation and capnometry   Cuff volume (mL): 7  Measured from: lips  ETT/EBT  to lips (cm): 21  Number of attempts at approach: 1  Assessment: lips, teeth, and gum same as pre-op and atraumatic intubation    Additional Comments  ATOETx1. No change in dentition.

## 2024-12-30 NOTE — CONSULTS
"[facility]  Consult Note    Patient Name: Elyssa Olivarez  : 1985  MRN: [id number]  Primary Care Physician:  Vimal Moseley PA-C  Referring Physician: Delmar Rodriguez IV, MD  Date of admission: 2024  Date and Time of Care: 24 at 1430    Inpatient Hospitalist Consult  Consult performed by: Richmond Redd MD  Consult ordered by: Tomas Ga PA          Reason for Consult/ Chief Complaint: Med mgmt    Consult Requested By: Delmar Rodriguez IV, MD    Subjective:     History of Present Illness:   Per the documentation by Delmar Rodriguez IV, MD, dated 24,  \"39-year-old female with about a 6-month history of back pain and lower extremity pain and numbness who was found to have benign-appearing intradural lesion in the lower lumbar spine resulting in severe stenosis and compression of the nerves within the thecal sac. Given these findings patient was taken to the OR for L4-S1 laminectomy and resection of intradural tumor. \"  Hospitalist team consulted for med mgmt.   Pt seen following procedure, sleeping but easily rousable to touch. States she is starting to feel some pain at her op site, otherwise no acute complaints.     Review of Systems:   Review of Systems   Constitutional:  Negative for activity change, appetite change, chills and fever.   HENT: Negative.     Eyes: Negative.    Respiratory:  Negative for cough, chest tightness, shortness of breath and wheezing.    Cardiovascular:  Negative for chest pain, palpitations and leg swelling.   Gastrointestinal:  Negative for abdominal pain, constipation, diarrhea and nausea.   Musculoskeletal:         + pain at op site   Neurological:  Negative for dizziness, speech difficulty, light-headedness and headaches.       Personal History:     Past Medical History:   Diagnosis Date    Allergic     Ankle pain, chronic     right    Autism disorder     Depression     Goiter     Hearing loss     Hip dysplasia, congenital     " Left    Hyperglycemia     Hyperlipidemia     Migraine headache     Mood disorder     MRSA infection     Obesity     Partial hearing loss     Pupil dilation     right    Right foot pain     Chronic       Past Surgical History:   Procedure Laterality Date    ANKLE SURGERY Bilateral     GALLBLADDER SURGERY      HYSTERECTOMY      TYMPANOPLASTY         Family History: family history includes Atrial fibrillation in her mother; Congenital heart disease in her mother; Heart disease in her father, maternal grandmother, maternal uncle, and paternal grandmother. Otherwise pertinent FHx was reviewed and not pertinent to current issue.    Social History:  reports that she has never smoked. She has never been exposed to tobacco smoke. She has never used smokeless tobacco. She reports that she does not drink alcohol and does not use drugs.    Home Medications:   HYDROcodone-acetaminophen, LORazepam, Rollator Ultra-Light, aspirin, atorvastatin, clindamycin-benzoyl peroxide, cyclobenzaprine, ezetimibe, gabapentin, lamoTRIgine, meloxicam, naloxone, pantoprazole, sertraline, and traZODone    Allergies:  Allergies   Allergen Reactions    [place] Concentrate [Flavoring Agent] Hives     Orange ibuprofen         Objective:     Vital Signs  Temp:  [97.6 °F (36.4 °C)-98.6 °F (37 °C)] 98 °F (36.7 °C)  Heart Rate:  [] 102  Resp:  [8-19] 8  BP: ()/(47-96) 117/96  Flow (L/min) (Oxygen Therapy):  [2-6] 2   Body mass index is 44.08 kg/m².    Physical Exam  Physical Exam  Constitutional:       Appearance: Normal appearance.   HENT:      Head: Normocephalic and atraumatic.      Mouth/Throat:      Mouth: Mucous membranes are moist.      Pharynx: Oropharynx is clear.   Eyes:      Extraocular Movements: Extraocular movements intact.      Conjunctiva/sclera: Conjunctivae normal.      Pupils: Pupils are equal, round, and reactive to light.   Cardiovascular:      Rate and Rhythm: Normal rate and regular rhythm.      Pulses: Normal pulses.       Heart sounds: Normal heart sounds.   Pulmonary:      Effort: Pulmonary effort is normal.      Breath sounds: No wheezing, rhonchi or rales.   Abdominal:      General: Abdomen is flat. Bowel sounds are normal.      Palpations: Abdomen is soft.      Tenderness: There is no abdominal tenderness. There is no guarding.   Musculoskeletal:      Cervical back: Normal range of motion and neck supple.      Right lower leg: No edema.      Left lower leg: No edema.   Neurological:      General: No focal deficit present.      Mental Status: She is alert and oriented to person, place, and time. Mental status is at baseline.         Scheduled Meds   atorvastatin, 80 mg, Oral, Daily  ceFAZolin, 2,000 mg, Intravenous, Q8H  [START ON 12/31/2024] enoxaparin, 40 mg, Subcutaneous, Q12H  gabapentin, 100 mg, Oral, TID  lamoTRIgine, 150 mg, Oral, BID  LORazepam, 1 mg, Oral, Q6H  pantoprazole, 40 mg, Oral, Daily  sertraline, 100 mg, Oral, Daily  sodium chloride, 10 mL, Intravenous, Q12H  traZODone, 50 mg, Oral, Nightly       PRN Meds     acetaminophen **OR** acetaminophen **OR** acetaminophen    senna-docusate sodium **AND** polyethylene glycol **AND** bisacodyl **AND** bisacodyl    cyclobenzaprine    HYDROcodone-acetaminophen    HYDROmorphone **AND** naloxone    melatonin    ondansetron    ondansetron ODT    sodium chloride    sodium chloride   Infusions         Diagnostic Data          No radiology results for the last day      I reviewed the patient's new clinical results.    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       Intradural extramedullary spinal tumor  - s/p resection 12/30/24  - cefazolin given for ppx   - pain control per primary     Hyperlipidemia  - cont home atorvastatin, holding zetia as not on formulary      Anxiety  Depression  - Cont home sertraline, prn ativan     Seizures  - Hx absence seizures, well controlled  - cont home lamotrigine     Insomnia  - Melatonin  - cont home trazodone      GERD  - cont home protonix    VTE Prophylaxis:  Pharmacologic & mechanical VTE prophylaxis orders are present.         Code status is   There are no questions and answers to display.       Plan for disposition:per primary    Time: 30 minutes        Signature: Electronically signed by Richmond Redd MD, 12/30/24, 14:27 EST.  [facility]ist Team

## 2024-12-30 NOTE — OP NOTE
LUMBAR SPINAL TUMOR REMOVAL  Procedure Report    Patient Name:  Elyssa Olivarez  YOB: 1985    Date of Surgery:  12/30/2024     Indications: 39-year-old female with about a 6-month history of back pain and lower extremity pain and numbness who was found to have benign-appearing intradural lesion in the lower lumbar spine resulting in severe stenosis and compression of the nerves within the thecal sac.  Given these findings patient was taken to the OR for L4-S1 laminectomy and resection of intradural tumor.  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 Item Serial No.  Lot No. LRB No. Used Action   KT SEAL HEMOS ABS FLOSEAL MATRX 1.5/FAST/PREP 5000/IU 10ML - [id number] Implant KT SEAL HEMOS ABS FLOSEAL MATRX 1.5/FAST/PREP 5000/IU 10ML  MICHEL[employer] [id number] N/A 1 Implanted   DEV CONTRL TISS STRATAFIX SPIRAL MNCRYL UD 3/0 PLS 30CM - [id number] Implant DEV CONTRL TISS STRATAFIX SPIRAL MNCRYL UD 3/0 PLS 30CM  [employer]  DIV OF J AND J [id number] N/A 1 Implanted   DURALMATRIX DURAGEN PLS 4X5IN - [id number] Implant DURALMATRIX DURAGEN PLS 4X5IN  [employer] [id number] N/A 1 Implanted   DEV WND/CLS CONTRL TISS STRATAFIX SYMM PDS PLS CTX 60CM RAFA - [id number] Implant DEV WND/CLS  CONTRL TISS STRATAFIX SYMM PDS PLS CTX 60CM RAFA  [employer]  DIV OF J AND J [id number] N/A 1 Implanted       Specimen:          Specimens       ID Source Type Tests Collected By Collected At Frozen?    A Spine, Lumbar Bone TISSUE PATHOLOGY EXAM   Delmar Rodriguez IV, MD 12/30/24 0905 Yes    Description: spinal cord tumor [id number]    Comment: Called down to pathology to inform them specimen was labeled wrong. Should be labeled as tissue not bone.     This specimen was not marked as sent.    B Spine, Lumbar Tissue TISSUE PATHOLOGY EXAM   Delmar Rodriguez IV, MD 12/30/24 0932 No    Description: spinal cord tumor.  phone # [phone]    This specimen was not marked as sent.                Findings: Intradural tumor emanating from nerve root most consistent with schwannoma or neurofibroma    Complications: None    Description of Procedure: Patient was brought to the OR placed under general endotracheal anesthesia.  She was flipped into the prone position on a Srinath table and prepped and draped in the usual fashion.  AP and lateral fluoroscopy was used to plan an incision from L4-S1.  Incision was made carried down to the deep dermal and fat layers with monopolar cautery.  Fascia was opened with monopolar cautery and subperiosteal dissection was used to expose the posterior lumbar spine from L4-S1.  Levels were confirmed on lateral fluoroscopy.  Laminectomy was then performed from L4-S1.  Leksell was used to remove the interspinous ligaments and spinous processes.  High-speed bur was used to remove and shelled out the lamina.  3 and 4 Kerrisons were used to remove remaining shelled out lamina and ligamentum flavum.  Hemostasis was achieved with bipolar cautery and Gelfoam powder.  Microscope was brought to the field.  The dura was then opened from approximately level of L4-S1 with 11 blade.  The dural edges were tacked up to surrounding muscle with Nurolon sutures.  The lesion was immediately evident and the arachnoid over the lesion was opened  with a round knife and microscissors.  Nerve root surrounding the posterior portion of the lesion were carefully dissected away using blunt and sharp dissection to remove adherent arachnoid layers.  The posterior portion of the tumor was cauterized with bipolar cautery and a portion was sent for frozen section which came back consistent with benign lesion.  The tumor was then removed piecemeal taking care to dissect away surrounding nerve roots.  Apparent that the lesion was emanating from a small nerve root in the proximal and distal attachment points were identified.  Once all other nerve roots were dissected away the involved proximal and distal portion of the root was cauterized with bipolar cautery and cut with Metzenbaum scissors approximately 5 mm away from the tumor itself.  The remaining tumor was then removed.  The intradural space was thoroughly irrigated and hemostasis was achieved with thrombin.  Once meticulous hemostasis was achieved the dura was closed with running Nurolon suture.  DuraGen was placed over the dural closure as well as dural sealant.  Meticulous hemostasis was achieved within the muscle and fat layers.  Muscle was reapproximated using 0 Vicryl sutures, the fascia was closed in a watertight fashion with strata fix 1, the deep dermal layer was closed with 2-0 Vicryl sutures and the skin was closed with a running subcuticular Monocryl.  Dermabond was placed over the wound.  There were no changes in neuromonitoring throughout the case                Assistant: Tomas Ga PA  was responsible for performing the following activities: Retraction, Suction, Irrigation, Suturing, Closing, and Placing Dressing and their skilled assistance was necessary for the success of this case.    Delmar Rodriguez IV, MD     Date: 12/30/2024  Time: 10:14 EST

## 2024-12-31 LAB
ANION GAP SERPL CALCULATED.3IONS-SCNC: 12.3 MMOL/L (ref 5–15)
BASOPHILS # BLD AUTO: 0.01 10*3/MM3 (ref 0–0.2)
BASOPHILS NFR BLD AUTO: 0.1 % (ref 0–1.5)
BUN SERPL-MCNC: 6 MG/DL (ref 6–20)
BUN/CREAT SERPL: 10.3 (ref 7–25)
CALCIUM SPEC-SCNC: 9.4 MG/DL (ref 8.6–10.5)
CHLORIDE SERPL-SCNC: 102 MMOL/L (ref 98–107)
CO2 SERPL-SCNC: 25.7 MMOL/L (ref 22–29)
CREAT SERPL-MCNC: 0.58 MG/DL (ref 0.57–1)
DEPRECATED RDW RBC AUTO: 37.8 FL (ref 37–54)
EGFRCR SERPLBLD CKD-EPI 2021: 119.7 ML/MIN/1.73
EOSINOPHIL # BLD AUTO: 0 10*3/MM3 (ref 0–0.4)
EOSINOPHIL NFR BLD AUTO: 0 % (ref 0.3–6.2)
ERYTHROCYTE [DISTWIDTH] IN BLOOD BY AUTOMATED COUNT: 11.8 % (ref 12.3–15.4)
GLUCOSE SERPL-MCNC: 189 MG/DL (ref 65–99)
HCT VFR BLD AUTO: 37.5 % (ref 34–46.6)
HGB BLD-MCNC: 12.6 G/DL (ref 12–15.9)
IMM GRANULOCYTES # BLD AUTO: 0.06 10*3/MM3 (ref 0–0.05)
IMM GRANULOCYTES NFR BLD AUTO: 0.5 % (ref 0–0.5)
LYMPHOCYTES # BLD AUTO: 1.25 10*3/MM3 (ref 0.7–3.1)
LYMPHOCYTES NFR BLD AUTO: 9.5 % (ref 19.6–45.3)
MCH RBC QN AUTO: 30 PG (ref 26.6–33)
MCHC RBC AUTO-ENTMCNC: 33.6 G/DL (ref 31.5–35.7)
MCV RBC AUTO: 89.3 FL (ref 79–97)
MONOCYTES # BLD AUTO: 0.81 10*3/MM3 (ref 0.1–0.9)
MONOCYTES NFR BLD AUTO: 6.1 % (ref 5–12)
NEUTROPHILS NFR BLD AUTO: 11.09 10*3/MM3 (ref 1.7–7)
NEUTROPHILS NFR BLD AUTO: 83.8 % (ref 42.7–76)
NRBC BLD AUTO-RTO: 0 /100 WBC (ref 0–0.2)
PLATELET # BLD AUTO: 313 10*3/MM3 (ref 140–450)
PMV BLD AUTO: 9.3 FL (ref 6–12)
POTASSIUM SERPL-SCNC: 4.2 MMOL/L (ref 3.5–5.2)
RBC # BLD AUTO: 4.2 10*6/MM3 (ref 3.77–5.28)
SODIUM SERPL-SCNC: 140 MMOL/L (ref 136–145)
WBC NRBC COR # BLD AUTO: 13.22 10*3/MM3 (ref 3.4–10.8)

## 2024-12-31 PROCEDURE — 97535 SELF CARE MNGMENT TRAINING: CPT | Performed by: OCCUPATIONAL THERAPIST

## 2024-12-31 PROCEDURE — 85025 COMPLETE CBC W/AUTO DIFF WBC: CPT

## 2024-12-31 PROCEDURE — 25010000002 CEFAZOLIN PER 500 MG

## 2024-12-31 PROCEDURE — 25010000002 ENOXAPARIN PER 10 MG

## 2024-12-31 PROCEDURE — 25010000002 HYDROMORPHONE 1 MG/ML SOLUTION

## 2024-12-31 PROCEDURE — 97166 OT EVAL MOD COMPLEX 45 MIN: CPT | Performed by: OCCUPATIONAL THERAPIST

## 2024-12-31 PROCEDURE — 97162 PT EVAL MOD COMPLEX 30 MIN: CPT

## 2024-12-31 PROCEDURE — 80048 BASIC METABOLIC PNL TOTAL CA: CPT

## 2024-12-31 PROCEDURE — 99024 POSTOP FOLLOW-UP VISIT: CPT

## 2024-12-31 RX ADMIN — ENOXAPARIN SODIUM 40 MG: 100 INJECTION SUBCUTANEOUS at 20:20

## 2024-12-31 RX ADMIN — LORAZEPAM 1 MG: 1 TABLET ORAL at 17:35

## 2024-12-31 RX ADMIN — GABAPENTIN 100 MG: 100 CAPSULE ORAL at 08:25

## 2024-12-31 RX ADMIN — CEFAZOLIN 2000 MG: 2 INJECTION, POWDER, FOR SOLUTION INTRAMUSCULAR; INTRAVENOUS at 00:04

## 2024-12-31 RX ADMIN — HYDROMORPHONE HYDROCHLORIDE 0.25 MG: 1 INJECTION, SOLUTION INTRAMUSCULAR; INTRAVENOUS; SUBCUTANEOUS at 20:21

## 2024-12-31 RX ADMIN — TRAZODONE HYDROCHLORIDE 50 MG: 50 TABLET ORAL at 20:21

## 2024-12-31 RX ADMIN — LAMOTRIGINE 150 MG: 100 TABLET ORAL at 08:25

## 2024-12-31 RX ADMIN — LORAZEPAM 1 MG: 1 TABLET ORAL at 00:04

## 2024-12-31 RX ADMIN — NYSTATIN: 100000 POWDER TOPICAL at 01:03

## 2024-12-31 RX ADMIN — LORAZEPAM 1 MG: 1 TABLET ORAL at 06:05

## 2024-12-31 RX ADMIN — SERTRALINE 100 MG: 100 TABLET, FILM COATED ORAL at 08:26

## 2024-12-31 RX ADMIN — CYCLOBENZAPRINE 10 MG: 10 TABLET, FILM COATED ORAL at 08:26

## 2024-12-31 RX ADMIN — LORAZEPAM 1 MG: 1 TABLET ORAL at 11:59

## 2024-12-31 RX ADMIN — GABAPENTIN 100 MG: 100 CAPSULE ORAL at 15:21

## 2024-12-31 RX ADMIN — Medication 10 ML: at 20:22

## 2024-12-31 RX ADMIN — Medication 10 ML: at 08:29

## 2024-12-31 RX ADMIN — GABAPENTIN 100 MG: 100 CAPSULE ORAL at 20:21

## 2024-12-31 RX ADMIN — ENOXAPARIN SODIUM 40 MG: 100 INJECTION SUBCUTANEOUS at 08:25

## 2024-12-31 RX ADMIN — ATORVASTATIN CALCIUM 80 MG: 40 TABLET, FILM COATED ORAL at 08:26

## 2024-12-31 RX ADMIN — HYDROMORPHONE HYDROCHLORIDE 0.25 MG: 1 INJECTION, SOLUTION INTRAMUSCULAR; INTRAVENOUS; SUBCUTANEOUS at 08:26

## 2024-12-31 RX ADMIN — HYDROCODONE BITARTRATE AND ACETAMINOPHEN 2 TABLET: 5; 325 TABLET ORAL at 11:59

## 2024-12-31 RX ADMIN — PANTOPRAZOLE SODIUM 40 MG: 40 TABLET, DELAYED RELEASE ORAL at 08:26

## 2024-12-31 RX ADMIN — HYDROCODONE BITARTRATE AND ACETAMINOPHEN 2 TABLET: 5; 325 TABLET ORAL at 06:00

## 2024-12-31 RX ADMIN — NYSTATIN: 100000 POWDER TOPICAL at 08:48

## 2024-12-31 RX ADMIN — NYSTATIN: 100000 POWDER TOPICAL at 20:20

## 2024-12-31 NOTE — PAYOR COMM NOTE
"Clinical update for case# [id number]     Elective IP surgery  OR note attached.  ===========  EXTENDED AUTHORIZATION PENDING:   PLEASE FAX OR CALL DETERMINATION TO CONTACT BELOW:       THANK YOU,    BROCK Loyd, RN  Utilization Review  [facility]  Phone: [phone]  Fax: [phone]      NPI: [id number]  Tax ID: [id number]        Mikeghislainein, Veronica JAMES \"Janki\" (39 y.o. Female)       Date of Birth   1985    Social Security Number       Address   [address] [facility] [place] [address]    Home Phone   [phone]    MRN   [id number]       [employer]ism   [employer] [facility]    Marital Status   Single                            Admission Date   12/30/24    Admission Type   Elective    Admitting Provider   Delmar Rodriguez IV, MD    Attending Provider   Delmar Rodriguez IV, MD    Department, Room/Bed   [facility], 4113/1       Discharge Date       Discharge Disposition       Discharge Destination                                 Attending Provider: Delmar Rodriguez IV, MD    Allergies: Orange Concentrate [Flavoring Agent]    Isolation: None   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)   Code Status: Prior    Ht: 157.5 cm (62\")   Wt: 109 kg (241 lb)    Admission Cmt: None   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]                   Active Insurance as of 12/30/2024       Primary Coverage       Payor Plan Insurance Group Employer/Plan Group    ANTHEM MEDICARE REPLACEMENT ANTHEM MED ADV SNP [id number]       Payor Plan Address Payor Plan Phone Number Payor Plan Fax Number Effective Dates     [address] [phone]  1/1/2024 - None Entered    [facility] [address]         Subscriber Name Subscriber Birth Date Member ID       JANKI CHAOJUANJOSE RAMIREZ 1985 [id number]               Secondary Coverage       Payor Plan Insurance Group Employer/Plan Group    [id number]        Payor Plan Address Payor Plan Phone Number Payor Plan Fax Number " Effective Dates    [address] [address]   12/4/2024 - None Entered    [place] [address]         Subscriber Name Subscriber Birth Date Member ID       VERONICA CHAO 1985 [id number]                     Emergency Contacts        (Rel.) Home Phone Work Phone Mobile Phone    SHIRLEY MUELLER (Mother) [phone]    KRISTEL MUELLER (Father) [phone] -- --              Vital Signs (last day)       Date/Time Temp Temp src Pulse Resp BP Patient Position SpO2    12/31/24 1208 98.7 (37.1) Oral -- 20 110/71 Lying 95    12/31/24 0811 97.5 (36.4) Oral 97 24 150/77 Lying 91    12/31/24 0528 97.6 (36.4) Axillary 111 18 135/87 Lying 93    12/31/24 0100 98 (36.7) Oral 88 22 120/77 Lying 96    12/30/24 2214 98.3 (36.8) Oral 99 15 121/78 Lying 95    12/30/24 1623 97.9 (36.6) Oral 96 12 105/70 Lying 96    12/30/24 1143 98 (36.7) Oral 102 8 117/96 Lying 95    12/30/24 1128 97.6 (36.4) Oral 97 12 140/84 -- 95    12/30/24 1114 -- -- 86 18 90/51 -- 94    12/30/24 1100 -- -- 114 14 122/72 -- 93    12/30/24 1059 -- -- 102 17 107/48 -- 97    12/30/24 1044 -- -- 84 18 97/47 -- 95    12/30/24 1039 -- -- 86 17 99/50 -- 96    12/30/24 1035 -- -- 90 19 110/51 -- 97    12/30/24 1029 98.2 (36.8) Oral 99 10 101/53 -- 94    12/30/24 0600 98.6 (37) Oral 105 11 120/92 -- 98          Current Facility-Administered Medications   Medication Dose Route Frequency Provider Last Rate Last Admin    acetaminophen (TYLENOL) tablet 650 mg  650 mg Oral Q4H PRN Tomas Ga PA        Or    acetaminophen (TYLENOL) 160 MG/5ML oral solution 650 mg  650 mg Oral Q4H PRN Tomas Ga PA        Or    acetaminophen (TYLENOL) suppository 650 mg  650 mg Rectal Q4H PRN Tomas Ga PA        atorvastatin (LIPITOR) tablet 80 mg  80 mg Oral Daily Tomas Ga PA   80 mg at 12/31/24 0826    sennosides-docusate (PERICOLACE) 8.6-50 MG per tablet 2 tablet  2 tablet Oral BID PRN Tomas Ga PA        And     polyethylene glycol (MIRALAX) packet 17 g  17 g Oral Daily PRN Tomas Ga PA        And    bisacodyl (DULCOLAX) EC tablet 5 mg  5 mg Oral Daily PRN Tomas Ga PA        And    bisacodyl (DULCOLAX) suppository 10 mg  10 mg Rectal Daily PRN Tomas Ga PA        cyclobenzaprine (FLEXERIL) tablet 10 mg  10 mg Oral TID PRN Tomas Ga PA   10 mg at 12/31/24 0826    Enoxaparin Sodium (LOVENOX) syringe 40 mg  40 mg Subcutaneous Q12H Tomas Ga PA   40 mg at 12/31/24 0825    gabapentin (NEURONTIN) capsule 100 mg  100 mg Oral TID Tomas Ga PA   100 mg at 12/31/24 0825    HYDROcodone-acetaminophen (NORCO) 5-325 MG per tablet 2 tablet  2 tablet Oral Q4H PRN Tomas Ga PA   2 tablet at 12/31/24 1159    HYDROmorphone (DILAUDID) injection 0.25 mg  0.25 mg Intravenous Q4H PRN Tomas Ga PA   0.25 mg at 12/31/24 0826    And    naloxone (NARCAN) injection 0.4 mg  0.4 mg Intravenous Q5 Min PRN Tomas Ga PA        lamoTRIgine (LaMICtal) tablet 150 mg  150 mg Oral BID Tomas Ga PA   150 mg at 12/31/24 0825    LORazepam (ATIVAN) tablet 1 mg  1 mg Oral Q6H Tomas Ga PA   1 mg at 12/31/24 1159    melatonin tablet 5 mg  5 mg Oral Nightly PRN Tomas Ga PA        nystatin (MYCOSTATIN) powder   Topical Q12H Edu Fair DO   Given at 12/31/24 0848    ondansetron (ZOFRAN) injection 4 mg  4 mg Intravenous Q6H PRN Tomas Ga PA        ondansetron ODT (ZOFRAN-ODT) disintegrating tablet 4 mg  4 mg Oral Q6H PRN Tomas Ga PA        pantoprazole (PROTONIX) EC tablet 40 mg  40 mg Oral Daily Tomas Ga PA   40 mg at 12/31/24 0826    sertraline (ZOLOFT) tablet 100 mg  100 mg Oral Daily Tomas Ga PA   100 mg at 12/31/24 0826    sodium chloride 0.9 % flush 10 mL  10 mL Intravenous Q12H Tomas Ga PA   10 mL at 12/31/24 0829    sodium chloride 0.9 % flush 10 mL  10 mL Intravenous PRN Tomas Ga PA        sodium  chloride 0.9 % infusion 40 mL  40 mL Intravenous PRN Tomas Ga PA        traZODone (DESYREL) tablet 50 mg  50 mg Oral Nightly Tomas Ga PA   50 mg at 12/30/24 2151        Operative/Procedure Notes (last 48 hours)        Delmar Rodriguez IV, MD at 12/30/24 0809          LUMBAR SPINAL TUMOR REMOVAL  Procedure Report    Patient Name:  Elyssa Olivarez  YOB: 1985    Date of Surgery:  12/30/2024     Indications: 39-year-old female with about a 6-month history of back pain and lower extremity pain and numbness who was found to have benign-appearing intradural lesion in the lower lumbar spine resulting in severe stenosis and compression of the nerves within the thecal sac.  Given these findings patient was taken to the OR for L4-S1 laminectomy and resection of intradural tumor.  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 Item Serial No.  Lot No. LRB No. Used Action   KT SEAL HEMOS ABS FLOSEAL MATRX 1.5/FAST/PREP 5000/IU 10ML - [id number] Implant KT SEAL HEMOS ABS FLOSEAL MATRX 1.5/FAST/PREP 5000/IU 10ML  [employer] [id number] N/A 1 Implanted   DEV CONTRL TISS STRATAFIX SPIRAL MNCRYL UD 3/0 PLS 30CM - [id number] Implant DEV CONTRL TISS STRATAFIX SPIRAL MNCRYL UD 3/0 PLS  30CM  [employer] [id number] N/A 1 Implanted   DURALMATRIX DURAGEN PLS 4X5IN - [id number] Implant DURALMATRIX DURAGEN PLS 4X5IN  [employer] [id number] N/A 1 Implanted   DEV WND/CLS CONTRL TISS STRATAFIX SYMM PDS PLS CTX 60CM RAFA - [id number] Implant DEV WND/CLS CONTRL TISS STRATAFIX SYMM PDS PLS CTX 60CM RAFA  [employer]  DIV [employer]ENN[employer]ER [employer] JENN[employer]ER [id number] N/A 1 Implanted       Specimen:          Specimens       ID Source Type Tests Collected By Collected At Frozen?    A Spine, Lumbar Bone TISSUE PATHOLOGY EXAM   Delmar Rodriguez IV, MD 12/30/24 0905 Yes    Description: spinal cord tumor 3421    Comment: Called down to pathology to inform them specimen was labeled wrong. Should be labeled as tissue not bone.     This specimen was not marked as sent.    B Spine, Lumbar Tissue TISSUE PATHOLOGY EXAM   Delmar Rodriguez IV, MD 12/30/24 0932 No    Description: spinal cord tumor.  phone # [phone]    This specimen was not marked as sent.                Findings: Intradural tumor emanating from nerve root most consistent with schwannoma or neurofibroma    Complications: None    Description of Procedure: Patient was brought to the OR placed under general endotracheal anesthesia.  She was flipped into the prone position on a Srinath table and prepped and draped in the usual fashion.  AP and lateral fluoroscopy was used to plan an incision from L4-S1.  Incision was made carried down to the deep dermal and fat layers with monopolar cautery.  Fascia was opened with monopolar cautery and subperiosteal dissection was used to expose the posterior lumbar spine from L4-S1.  Levels were confirmed on lateral fluoroscopy.  Laminectomy was then performed from L4-S1.  Leksell was used to remove the interspinous ligaments and spinous processes.  High-speed bur was used to remove and shelled out the lamina.  3 and 4 Kerrisons were used to remove remaining shelled out lamina and ligamentum flavum.  Hemostasis was achieved with bipolar cautery and Gelfoam  powder.  Microscope was brought to the field.  The dura was then opened from approximately level of L4-S1 with 11 blade.  The dural edges were tacked up to surrounding muscle with Nurolon sutures.  The lesion was immediately evident and the arachnoid over the lesion was opened with a round knife and microscissors.  Nerve root surrounding the posterior portion of the lesion were carefully dissected away using blunt and sharp dissection to remove adherent arachnoid layers.  The posterior portion of the tumor was cauterized with bipolar cautery and a portion was sent for frozen section which came back consistent with benign lesion.  The tumor was then removed piecemeal taking care to dissect away surrounding nerve roots.  Apparent that the lesion was emanating from a small nerve root in the proximal and distal attachment points were identified.  Once all other nerve roots were dissected away the involved proximal and distal portion of the root was cauterized with bipolar cautery and cut with Metzenbaum scissors approximately 5 mm away from the tumor itself.  The remaining tumor was then removed.  The intradural space was thoroughly irrigated and hemostasis was achieved with thrombin.  Once meticulous hemostasis was achieved the dura was closed with running Nurolon suture.  DuraGen was placed over the dural closure as well as dural sealant.  Meticulous hemostasis was achieved within the muscle and fat layers.  Muscle was reapproximated using 0 Vicryl sutures, the fascia was closed in a watertight fashion with strata fix 1, the deep dermal layer was closed with 2-0 Vicryl sutures and the skin was closed with a running subcuticular Monocryl.  Dermabond was placed over the wound.  There were no changes in neuromonitoring throughout the case                Assistant: Tomas Ga PA  was responsible for performing the following activities: Retraction, Suction, Irrigation, Suturing, Closing, and Placing Dressing and  "their skilled assistance was necessary for the success of this case.    Delmar Rodriguez IV, MD     Date: 2024  Time: 10:14 EST      Electronically signed by Delmar Rodriguez IV, MD at 24 1022          Physician Progress Notes (last 48 hours)        Blair Bhakta MD at 24 0949              [facility] MEDICINE SERVICE  DAILY PROGRESS NOTE    NAME: Elyssa Olivarez  : 1985  MRN: [id number]      LOS: 1 day     PROVIDER OF SERVICE: Blair Bhakta MD    Chief Complaint: Intradural extramedullary spinal tumor    Subjective:     Interval History:  History taken from: patient chart      Per the documentation by Delmar Rodriguez IV, MD, dated 24,  \"39-year-old female with about a 6-month history of back pain and lower extremity pain and numbness who was found to have benign-appearing intradural lesion in the lower lumbar spine resulting in severe stenosis and compression of the nerves within the thecal sac. Given these findings patient was taken to the OR for L4-S1 laminectomy and resection of intradural tumor. \"  Hospitalist team consulted for med mgmt.   Pt seen following procedure, sleeping but easily rousable to touch. States she is starting to feel some pain at her op site, otherwise no acute complaints.      -seen In bed NAD, VSS, continues bedrest until noon 24.  patient tolerating diet, On IV antibiotics, nystatin powder started for rash under breast and abdominal/groin folds. F/C to [facility]. VSS, no concerns or complaints at this time.     Review of Systems  Constitutional:  Negative for activity change, appetite change, chills and fever.   HENT: Negative.     Eyes: Negative.    Respiratory:  Negative for cough, chest tightness, shortness of breath and wheezing.    Cardiovascular:  Negative for chest pain, palpitations and leg swelling.   Gastrointestinal:  Negative for abdominal pain, constipation, diarrhea and nausea.   Musculoskeletal:         + pain at op site "   Neurological:  Negative for dizziness, speech difficulty, light-headedness and headaches.   Objective:     Vital Signs  Temp:  [97.5 °F (36.4 °C)-98.3 °F (36.8 °C)] 97.5 °F (36.4 °C)  Heart Rate:  [] 97  Resp:  [8-24] 24  BP: ()/(47-96) 150/77  Flow (L/min) (Oxygen Therapy):  [2-6] 2   Body mass index is 44.08 kg/m².    Physical Exam        Appearance: Normal appearance.   HENT:      Head: Normocephalic and atraumatic.      Mouth/Throat:      Mouth: Mucous membranes are moist.      Pharynx: Oropharynx is clear.   Eyes:      Extraocular Movements: Extraocular movements intact.      Conjunctiva/sclera: Conjunctivae normal.      Pupils: Pupils are equal, round, and reactive to light.   Cardiovascular:      Rate and Rhythm: Normal rate and regular rhythm.      Pulses: Normal pulses.      Heart sounds: Normal heart sounds.   Pulmonary:      Effort: Pulmonary effort is normal.      Breath sounds: No wheezing, rhonchi or rales.   Abdominal:      General: Abdomen is flat. Bowel sounds are normal.      Palpations: Abdomen is soft.      Tenderness: There is no abdominal tenderness. There is no guarding.   Musculoskeletal:      Cervical back: Normal range of motion and neck supple.      Right lower leg: No edema.      Left lower leg: No edema.   Neurological:      General: No focal deficit present.      Mental Status: She is alert and oriented to person, place, and time. Mental status is at baseline.        Diagnostic Data    Results from last 7 days   Lab Units 12/31/24  0122   WBC 10*3/mm3 13.22*   HEMOGLOBIN g/dL 12.6   HEMATOCRIT % 37.5   PLATELETS 10*3/mm3 313   GLUCOSE mg/dL 189*   CREATININE mg/dL 0.58   BUN mg/dL 6   SODIUM mmol/L 140   POTASSIUM mmol/L 4.2   ANION GAP mmol/L 12.3       No radiology results for the last day      I reviewed the patient's new clinical results.    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          Intradural extramedullary spinal tumor  - s/p resection 12/30/24  - cefazolin given for ppx   - pain control   Per spine-Continue to lay flat until noon today  - At noon, raise HOB to 30 degrees for 30 minutes  - Then raise HOB to 45 degrees for 30 minutes  - If patient tolerates this well with no postural headaches then okay to work with PT/OT  - If patient develops postural headaches will need to return to lying flat  - Continue incisional care  - Continue pain regimen  - Discharge plans pending HOB trial and PT/OT assessmen     Hyperlipidemia  - cont home atorvastatin, holding zetia as not on formulary     Anxiety  Depression  - Cont home sertraline, prn ativan     Seizures  - Hx absence seizures, well controlled  - cont home lamotrigine     Insomnia  - Melatonin  - cont home trazodone      GERD  - cont home protonix      VTE Prophylaxis:  Pharmacologic & mechanical VTE prophylaxis orders are present.      Disposition Planning:     Barriers to Discharge:neck pain  Anticipated Date of Discharge: 1/1  Place of Discharge: home      Time: 30 minutes     There are no questions and answers to display.       Signature: Electronically signed by Blair Bhakta MD, 12/31/24, 09:49 EST.  [facility] Hospitalist Team    Electronically signed by Blair Bhakta MD at 12/31/24 0953       Tomas Ga PA at 12/31/24 0734       Attestation signed by Delmar Rodriguez IV, MD at 12/31/24 1152    I have reviewed this documentation and agree.  Overall patient doing well.  We will slowly begin to mobilize today.  If patient develops postural headaches while mobilizing patient should go back to remaining flat in bed rest for 24 hours                  Neurosurgery Progress Note      Patient: Elyssa Olivarez    YOB: 1985    Date of Admission: 12/30/2024  5:41 AM    Status  Post: Lumbar 4 to sacral 1 laminectomy for resection of intradural tumor    Post Operative Day Number: 1 Day Post-Op      Subjective     Interval history:  No adverse events overnight.  Patient resting comfortably.  Pain well-controlled.  Moves all extremities.  Continues to remain flat on CSF leak precautions        Objective   Vitals:    12/30/24 1623 12/30/24 2214 12/31/24 0100 12/31/24 0528   BP: 105/70 121/78 120/77 135/87   BP Location: Left arm Left arm Left arm Left arm   Patient Position: Lying Lying Lying Lying   Pulse: 96 99 88 111   Resp: 12 15 22 18   Temp: 97.9 °F (36.6 °C) 98.3 °F (36.8 °C) 98 °F (36.7 °C) 97.6 °F (36.4 °C)   TempSrc: Oral Oral Oral Axillary   SpO2: 96% 95% 96% 93%   Weight:       Height:           Physical exam    General  - awake, cooperative, in no acute distress  Respiratory  - Normal respiratory rate and effort, no audible wheezes  Skin  - Surgical incision CDI with no swelling redness or drainage  NEUROLOGIC  - Moves all extremities  - Sensation intact throughout      Results review  CBC          12/6/2024    01:03 12/19/2024    11:43 12/31/2024    01:22   CBC   WBC 10.08  9.71  13.22    RBC 4.63  4.99  4.20    Hemoglobin 13.3  15.2  12.6    Hematocrit 41.4  44.5  37.5    MCV 89.4  89.2  89.3    MCH 28.7  30.5  30.0    MCHC 32.1  34.2  33.6    RDW 12.2  12.4  11.8    Platelets 312  329  313        BMP          12/6/2024    01:03 12/19/2024    11:43 12/31/2024    01:22   BMP   BUN 12  9  6    Creatinine 0.65  0.51  0.58    Sodium 138  139  140    Potassium 4.3  4.0  4.2    Chloride 101  100  102    CO2 26.9  27.6  25.7    Calcium 9.8  10.4  9.4              Assessment     MDM:  39 y.o. female 1 day status post L4-S1 laminectomy for resection of intradural tumor.  Patient is neurologically intact.  Pain relatively well-controlled.  Has been flat overnight on CSF leak precautions.  We will try raising the head of bed slowly around noon today and begin working with PT/OT if she does  "not develop postural headaches.    Plan     - Continue to lay flat until noon today  - At noon, raise HOB to 30 degrees for 30 minutes  - Then raise HOB to 45 degrees for 30 minutes  - If patient tolerates this well with no postural headaches then okay to work with PT/OT  - If patient develops postural headaches will need to return to lying flat  - Continue incisional care  - Continue pain regimen  - Discharge plans pending HOB trial and PT/OT assessment  - Call with any questions or concerns      I discussed my assessment and recommendations with Dr. Michael Ga PA-C  2024  07:34 EST    Electronically signed by Delmar Rodriguez IV, MD at 24 1152          Consult Notes (last 48 hours)        Richmond Redd MD at 24 1427        Consult Orders    1. Inpatient Hospitalist Consult [[id number]] ordered by Tomas Ga PA                     [facility] Medicine Services  Consult Note    Patient Name: Elyssa Olivarez  : 1985  MRN: [id number]  Primary Care Physician:  Vimal Moseley PA-C  Referring Physician: Delmar Rodriguez IV, MD  Date of admission: 2024  Date and Time of Care: 24 at 1430    Inpatient Hospitalist Consult  Consult performed by: Richmond Redd MD  Consult ordered by: Tomas Ga PA          Reason for Consult/ Chief Complaint: Med mgmt    Consult Requested By: Delmar Rdoriguez IV, MD    Subjective:     History of Present Illness:   Per the documentation by Delmar Rodriguez IV, MD, dated 24,  \"39-year-old female with about a 6-month history of back pain and lower extremity pain and numbness who was found to have benign-appearing intradural lesion in the lower lumbar spine resulting in severe stenosis and compression of the nerves within the thecal sac. Given these findings patient was taken to the OR for L4-S1 laminectomy and resection of intradural tumor. \"  Hospitalist team consulted for med mgmt.   Pt seen following procedure, sleeping but " easily rousable to touch. States she is starting to feel some pain at her op site, otherwise no acute complaints.     Review of Systems:   Review of Systems   Constitutional:  Negative for activity change, appetite change, chills and fever.   HENT: Negative.     Eyes: Negative.    Respiratory:  Negative for cough, chest tightness, shortness of breath and wheezing.    Cardiovascular:  Negative for chest pain, palpitations and leg swelling.   Gastrointestinal:  Negative for abdominal pain, constipation, diarrhea and nausea.   Musculoskeletal:         + pain at op site   Neurological:  Negative for dizziness, speech difficulty, light-headedness and headaches.       Personal History:     Past Medical History:   Diagnosis Date    Allergic     Ankle pain, chronic     right    Autism disorder     Depression     Goiter     Hearing loss     Hip dysplasia, congenital     Left    Hyperglycemia     Hyperlipidemia     Migraine headache     Mood disorder     MRSA infection     Obesity     Partial hearing loss     Pupil dilation     right    Right foot pain     Chronic       Past Surgical History:   Procedure Laterality Date    ANKLE SURGERY Bilateral     GALLBLADDER SURGERY      HYSTERECTOMY      TYMPANOPLASTY         Family History: family history includes Atrial fibrillation in her mother; Congenital heart disease in her mother; Heart disease in her father, maternal grandmother, maternal uncle, and paternal grandmother. Otherwise pertinent FHx was reviewed and not pertinent to current issue.    Social History:  reports that she has never smoked. She has never been exposed to tobacco smoke. She has never used smokeless tobacco. She reports that she does not drink alcohol and does not use drugs.    Home Medications:   HYDROcodone-acetaminophen, LORazepam, Rollator Ultra-Light, aspirin, atorvastatin, clindamycin-benzoyl peroxide, cyclobenzaprine, ezetimibe, gabapentin, lamoTRIgine, meloxicam, naloxone, pantoprazole, sertraline,  and traZODone    Allergies:  Allergies   Allergen Reactions    Elmo Concentrate [Flavoring Agent] Hives     Orange ibuprofen         Objective:     Vital Signs  Temp:  [97.6 °F (36.4 °C)-98.6 °F (37 °C)] 98 °F (36.7 °C)  Heart Rate:  [] 102  Resp:  [8-19] 8  BP: ()/(47-96) 117/96  Flow (L/min) (Oxygen Therapy):  [2-6] 2   Body mass index is 44.08 kg/m².    Physical Exam  Physical Exam  Constitutional:       Appearance: Normal appearance.   HENT:      Head: Normocephalic and atraumatic.      Mouth/Throat:      Mouth: Mucous membranes are moist.      Pharynx: Oropharynx is clear.   Eyes:      Extraocular Movements: Extraocular movements intact.      Conjunctiva/sclera: Conjunctivae normal.      Pupils: Pupils are equal, round, and reactive to light.   Cardiovascular:      Rate and Rhythm: Normal rate and regular rhythm.      Pulses: Normal pulses.      Heart sounds: Normal heart sounds.   Pulmonary:      Effort: Pulmonary effort is normal.      Breath sounds: No wheezing, rhonchi or rales.   Abdominal:      General: Abdomen is flat. Bowel sounds are normal.      Palpations: Abdomen is soft.      Tenderness: There is no abdominal tenderness. There is no guarding.   Musculoskeletal:      Cervical back: Normal range of motion and neck supple.      Right lower leg: No edema.      Left lower leg: No edema.   Neurological:      General: No focal deficit present.      Mental Status: She is alert and oriented to person, place, and time. Mental status is at baseline.         Scheduled Meds   atorvastatin, 80 mg, Oral, Daily  ceFAZolin, 2,000 mg, Intravenous, Q8H  [START ON 12/31/2024] enoxaparin, 40 mg, Subcutaneous, Q12H  gabapentin, 100 mg, Oral, TID  lamoTRIgine, 150 mg, Oral, BID  LORazepam, 1 mg, Oral, Q6H  pantoprazole, 40 mg, Oral, Daily  sertraline, 100 mg, Oral, Daily  sodium chloride, 10 mL, Intravenous, Q12H  traZODone, 50 mg, Oral, Nightly       PRN Meds     acetaminophen **OR** acetaminophen **OR**  acetaminophen    senna-docusate sodium **AND** polyethylene glycol **AND** bisacodyl **AND** bisacodyl    cyclobenzaprine    HYDROcodone-acetaminophen    HYDROmorphone **AND** naloxone    melatonin    ondansetron    ondansetron ODT    sodium chloride    sodium chloride   Infusions         Diagnostic Data          No radiology results for the last day      I reviewed the patient's new clinical results.    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       Intradural extramedullary spinal tumor  - s/p resection 12/30/24  - cefazolin given for ppx   - pain control per primary     Hyperlipidemia  - cont home atorvastatin, holding zetia as not on formulary     Anxiety  Depression  - Cont home sertraline, prn ativan     Seizures  - Hx absence seizures, well controlled  - cont home lamotrigine     Insomnia  - Melatonin  - cont home trazodone      GERD  - cont home protonix    VTE Prophylaxis:  Pharmacologic & mechanical VTE prophylaxis orders are present.         Code status is   There are no questions and answers to display.       Plan for disposition:per primary    Time: 30 minutes        Signature: Electronically signed by Richmond Redd MD, 12/30/24, 14:27 EST.  [facility] Hospitalist Team     Electronically signed by Richmond Redd MD at 12/30/24 6651

## 2024-12-31 NOTE — PLAN OF CARE
Goal Outcome Evaluation:  Plan of Care Reviewed With: patient        Progress: improving  Outcome Evaluation: Patient continues bedrest until noon 12/31/24 per order. Patient mother at bedside, patient tolerating diet, continues IV antibiotics, nystatin powder started for rash under breast and abdominal/groin folds. F/C to [facility]. VSS, no concerns or complaints at this time.

## 2024-12-31 NOTE — PLAN OF CARE
Goal Outcome Evaluation:                    Patient's lowe catheter removed, worked with therapy today and is min assist, complaints of back pain, treated per MAR. Able to make needs known, plan of care ongoing.

## 2024-12-31 NOTE — PLAN OF CARE
Goal Outcome Evaluation:  Plan of Care Reviewed With: patient, mother           Outcome Evaluation: Pt is a 39-year-old female with about a 6-month history of back pain and lower extremity pain and numbness who was found to have benign-appearing intradural lesion at L5 resulting in severe stenosis and compression of the nerves within the thecal sac. Pt underwent  L4-S1 laminectomy and resection of intradural tumor on 12/30/24 & is POD #1. Pt cleared to participate with therapy by nursing after bedrest orders lifted at noon. PMH: Autism, bilateral ankle pain (pt wears bilateral ankle braces), mood d/o, depression.At baseline, her Mother, who is her paid caregiver ,assists pt with bathing, toileting, LB dressing & ADL transfers on/off commode & in/out of tub. Pt ambulates primarily with a rollator. She has 3:1 commode, tub seat, RW, w/c & elec w/c PRN in home. Upon eval, pt is A&O X4. She c/o pain along back at surgical incision site as 10/10 to begin which improved to 7/10 after eval. Pt required max A X2 with max v.c. for log rolling tech, min A for sit<>stand with RW & to take steps laterally to her left. She required min A for dynamic sitting balance which improved to CGA with time & min A for standing balance. Pt is requiring max A for LB ADLs & min A for UB dressing tasks. OT will continue to work with pt. Discussed d/c options with pt & her mother as she would benefit from skilled rehab placement; however, both pt & Mother expressed that they are not agreeable to going to a facility, but would be agreeable to home with family assist & outpt therapy.    Anticipated Discharge Disposition (OT): home with assist, home with outpatient therapy services

## 2024-12-31 NOTE — THERAPY EVALUATION
Patient Name: Elyssa Olivarez  : 1985    MRN: [id number]                              Today's Date: 2024       Admit Date: 2024    Visit Dx:     ICD-10-CM ICD-9-CM   1. Intradural extramedullary spinal tumor  D49.7 239.7     Patient Active Problem List   Diagnosis    Acne    Allergic rhinitis    Autism    Ankle pain    Dilated pupil    Displaced bimalleolar fracture of left lower leg, initial encounter for closed fracture    Fatigue    Cholelithiasis    Gastroesophageal reflux disease    Headache    Hyperglycemia    Mixed hyperlipidemia    Anxiety disorder    Depression    Mood disorder    Class 2 severe obesity with serious comorbidity and body mass index (BMI) of 35.0 to 35.9 in adult    Thyroid nodule    Tonic pupillary reaction    Impacted cerumen of right ear    Chronic midline thoracic back pain    Sciatic nerve pain, right    Candidal intertrigo    Medicare annual wellness visit, subsequent    RLS (restless legs syndrome)    Vitamin D deficiency    Carbuncle    Abscess of left buttock    Hip dysplasia    Back pain    Radicular leg pain    Abnormal MRI, lumbar spine    Intradural extramedullary spinal tumor    Spinal cord tumor     Past Medical History:   Diagnosis Date    Allergic     Ankle pain, chronic     right    Autism disorder     Depression     Goiter     Hearing loss     Hip dysplasia, congenital     Left    Hyperglycemia     Hyperlipidemia     Migraine headache     Mood disorder     MRSA infection     Obesity     Partial hearing loss     Pupil dilation     right    Right foot pain     Chronic     Past Surgical History:   Procedure Laterality Date    ANKLE SURGERY Bilateral     GALLBLADDER SURGERY      HYSTERECTOMY      LUMBAR SPINAL TUMOR REMOVAL N/A 2024    Procedure: Lumbar 4 to sacral 1 laminectomy for resection of intradural tumor;  Surgeon: Delmar Rodriguez IV, MD;  Location: [facility] MAIN OR;  Service: Neurosurgery;  Laterality: N/A;    TYMPANOPLASTY        General  Information       Row Name 12/31/24 1532          OT Time and Intention    Document Type evaluation  -DT     Mode of Treatment occupational therapy  -DT     Patient Effort good  -DT       Row Name 12/31/24 1532          General Information    Prior Level of Function min assist:;mod assist:;all household mobility  Pt's Mom who is her paid caregiver. Her Mom assists her with bathing, dressing, toileting. She ambulates with rollator, but also has a RW, w/c, motorized w/c PRN. They have a tub with tub seat & 3:1 commode placed over the toilet.  -DT     Existing Precautions/Restrictions spinal;other (see comments)  Pt wears bilateral ankle braces which are currently in room PRN.  -DT     Barriers to Rehab medically complex;previous functional deficit  -DT       Row Name 12/31/24 1532          Living Environment    People in Home parent(s)  -DT       Row Name 12/31/24 1532          Home Main Entrance    Number of Stairs, Main Entrance none  -DT       Row Name 12/31/24 1532          Stairs Within Home, Primary    Number of Stairs, Within Home, Primary none  -DT       Row Name 12/31/24 1532          Cognition    Orientation Status (Cognition) oriented x 4  -DT       Row Name 12/31/24 1532          Safety Issues/Impairments Affecting Functional Mobility    Impairments Affecting Function (Mobility) balance;endurance/activity tolerance;pain;sensation/sensory awareness  -DT               User Key  (r) = Recorded By, (t) = Taken By, (c) = Cosigned By      Initials Name Provider Type    DT Madie Fair, OT Occupational Therapist                     Mobility/ADL's       Row Name 12/31/24 1542          Bed Mobility    Bed Mobility supine-sit;sit-supine  -DT     Supine-Sit [place] (Bed Mobility) maximum assist (25% patient effort);2 person assist;verbal cues  -DT     Sit-Supine [place] (Bed Mobility) maximum assist (25% patient effort);verbal cues;2 person assist  -DT     Comment, (Bed Mobility) v.c. for log  rolling tech. Pt c/o pain along incision.  -DT       Row Name 12/31/24 1540          Transfers    Transfers sit-stand transfer;stand-sit transfer  -DT       Row Name 12/31/24 1540          Sit-Stand Transfer    Sit-Stand [place] (Transfers) minimum assist (75% patient effort)  -DT     Assistive Device (Sit-Stand Transfers) walker, front-wheeled  -DT       Row Name 12/31/24 1540          Stand-Sit Transfer    Stand-Sit [place] (Transfers) minimum assist (75% patient effort)  -DT     Assistive Device (Stand-Sit Transfers) walker, front-wheeled  -DT       Row Name 12/31/24 1540          Functional Mobility    Functional Mobility- Ind. Level minimum assist (75% patient effort)  -DT     Functional Mobility- Device walker, front-wheeled  -DT     Functional Mobility- Comment side stepping a few steps to the left up the EOB.  -DT     Patient was able to Ambulate yes  -DT       Row Name 12/31/24 1540          Activities of Daily Living    BADL Assessment/Intervention lower body dressing;upper body dressing;grooming  -DT       Row Name 12/31/24 1540          Lower Body Dressing Assessment/Training    [place] Level (Lower Body Dressing) lower body dressing skills;maximum assist (25% patient effort)  -DT     Position (Lower Body Dressing) supine;edge of bed sitting  -DT       Row Name 12/31/24 1540          Upper Body Dressing Assessment/Training    [place] Level (Upper Body Dressing) upper body dressing skills;moderate assist (50% patient effort)  -DT     Position (Upper Body Dressing) edge of bed sitting  -DT       Row Name 12/31/24 1540          Grooming Assessment/Training    [place] Level (Grooming) grooming skills;standby assist  -DT     Position (Grooming) edge of bed sitting  -DT               User Key  (r) = Recorded By, (t) = Taken By, (c) = Cosigned By      Initials Name Provider Type    Madie Dias, OT Occupational Therapist                   Obj/Interventions       Row Name  12/31/24 1543          Sensory Assessment (Somatosensory)    Sensory Assessment (Somatosensory) left LE  -DT     Sensory Subjective Reports numbness  -DT     Sensory Assessment Pt reporting numbness in LLE below knee compared to RUE.  -DT       Row Name 12/31/24 1543          Range of Motion Comprehensive    General Range of Motion bilateral upper extremity ROM WFL  -DT       Row Name 12/31/24 1543          Strength Comprehensive (MMT)    General Manual Muscle Testing (MMT) Assessment upper extremity strength deficits identified  -DT     Comment, General Manual Muscle Testing (MMT) Assessment BUE= 4/5  -DT       Row Name 12/31/24 1543          Motor Skills    Motor Skills functional endurance  -DT     Functional Endurance fair  -DT       Row Name 12/31/24 1543          Balance    Balance Assessment sitting static balance;sitting dynamic balance;standing static balance;standing dynamic balance  -DT     Static Sitting Balance contact guard  -DT     Dynamic Sitting Balance minimal assist;contact guard;other (see comments)  improved with time.  -DT     Position, Sitting Balance sitting edge of bed  -DT     Static Standing Balance minimal assist  -DT     Dynamic Standing Balance minimal assist  -DT     Position/Device Used, Standing Balance walker, front-wheeled  -DT               User Key  (r) = Recorded By, (t) = Taken By, (c) = Cosigned By      Initials Name Provider Type    DT Madie Fair, OT Occupational Therapist                   Goals/Plan       Row Name 12/31/24 9209          Transfer Goal 1 (OT)    Activity/Assistive Device (Transfer Goal 1, OT) toilet  -DT     [place] Level/Cues Needed (Transfer Goal 1, OT) contact guard required  -DT     Time Frame (Transfer Goal 1, OT) 2 weeks  -DT       Row Name 12/31/24 2330          Bathing Goal 1 (OT)    Activity/Device (Bathing Goal 1, OT) bathing skills, all  -DT     [place] Level/Cues Needed (Bathing Goal 1, OT) minimum assist (75% or more  patient effort)  -DT     Time Frame (Bathing Goal 1, OT) 2 weeks  -DT       Row Name 12/31/24 9575          Dressing Goal 1 (OT)    Activity/Device (Dressing Goal 1, OT) dressing skills, all;lower body dressing  -DT     [place]/Cues Needed (Dressing Goal 1, OT) minimum assist (75% or more patient effort)  -DT     Time Frame (Dressing Goal 1, OT) 2 weeks  -DT       Row Name 12/31/24 2950          Toileting Goal 1 (OT)    Activity/Device (Toileting Goal 1, OT) toileting skills, all  -DT     [place] Level/Cues Needed (Toileting Goal 1, OT) minimum assist (75% or more patient effort)  -DT     Time Frame (Toileting Goal 1, OT) 2 weeks  -DT       Row Name 12/31/24 6990          Therapy Assessment/Plan (OT)    Planned Therapy Interventions (OT) activity tolerance training;BADL retraining;functional balance retraining;neuromuscular control/coordination retraining;occupation/activity based interventions;patient/caregiver education/training;ROM/therapeutic exercise;strengthening exercise;transfer/mobility retraining;adaptive equipment training  -DT               User Key  (r) = Recorded By, (t) = Taken By, (c) = Cosigned By      Initials Name Provider Type    DT Madie Fair, OT Occupational Therapist                   Clinical Impression       Row Name 12/31/24 8843          Pain Assessment    Pretreatment Pain Rating 10/10  -DT     Posttreatment Pain Rating 7/10  -DT     Pain Location back;other (see comments)  incisional  -DT     Pain Management Interventions activity modification encouraged;exercise or physical activity utilized;movement retraining implemented;nursing notified;positioning techniques utilized  -DT     Response to Pain Interventions activity participation with decreased pain;intervention effective per patient report  -DT       Row Name 12/31/24 3702          Plan of Care Review    Plan of Care Reviewed With patient;mother  -DT     Outcome Evaluation Pt is a 39-year-old female with about  a 6-month history of back pain and lower extremity pain and numbness who was found to have benign-appearing intradural lesion at L5 resulting in severe stenosis and compression of the nerves within the thecal sac. Pt underwent  L4-S1 laminectomy and resection of intradural tumor on 12/30/24 & is POD #1. Pt cleared to participate with therapy by nursing after bedrest orders lifted at noon. PMH: Autism, bilateral ankle pain (pt wears bilateral ankle braces), mood d/o, depression.At baseline, her Mother, who is her paid caregiver ,assists pt with bathing, toileting, LB dressing & ADL transfers on/off commode & in/out of tub. Pt ambulates primarily with a rollator. She has 3:1 commode, tub seat, RW, w/c & elec w/c PRN in home. Upon eval, pt is A&O X4. She c/o pain along back at surgical incision site as 10/10 to begin which improved to 7/10 after eval. Pt required max A X2 with max v.c. for log rolling tech, min A for sit<>stand with RW & to take steps laterally to her left. She required min A for dynamic sitting balance which improved to CGA with time & min A for standing balance. Pt is requiring max A for LB ADLs & min A for UB dressing tasks. OT will continue to work with pt. Discussed d/c options with pt & her mother as she would benefit from skilled rehab placement; however, both pt & Mother expressed that they are not agreeable to going to a facility, but would be agreeable to home with family assist & outpt therapy.  -DT       Row Name 12/31/24 4151          Therapy Assessment/Plan (OT)    Rehab Potential (OT) good  -DT     Criteria for Skilled Therapeutic Interventions Met (OT) yes;meets criteria;skilled treatment is necessary  -DT     Therapy Frequency (OT) 5 times/wk  -DT     Predicted Duration of Therapy Intervention (OT) until d/c  -DT       Row Name 12/31/24 7544          Therapy Plan Review/Discharge Plan (OT)    Anticipated Discharge Disposition (OT) home with assist;home with outpatient therapy services   -DT       Row Name 12/31/24 1545          Positioning and Restraints    Pre-Treatment Position in bed  -DT     Post Treatment Position bed  -DT     In Bed notified nsg;fowlers;call light within reach;encouraged to call for assist;exit alarm on;with family/caregiver;side rails up x2  -DT               User Key  (r) = Recorded By, (t) = Taken By, (c) = Cosigned By      Initials Name Provider Type    DT Madie Fair, OT Occupational Therapist                   Outcome Measures       Row Name 12/31/24 0710          How much help from another person do you currently need...    Turning from your back to your side while in flat bed without using bedrails? 1  -EH     Moving from lying on back to sitting on the side of a flat bed without bedrails? 1  -EH     Moving to and from a bed to a chair (including a wheelchair)? 1  -EH     Standing up from a chair using your arms (e.g., wheelchair, bedside chair)? 1  -EH     Climbing 3-5 steps with a railing? 1  -EH     To walk in hospital room? 1  -EH     AM-PAC 6 Clicks Score (PT) 6  -EH               User Key  (r) = Recorded By, (t) = Taken By, (c) = Cosigned By      Initials Name Provider Type    EH Jarod Brenner, RN Registered Nurse                    Occupational Therapy Education       Title: PT OT SLP Therapies (In Progress)       Topic: Occupational Therapy (In Progress)       Point: ADL training (Done)       Description:   Instruct learner(s) on proper safety adaptation and remediation techniques during self care or transfers.   Instruct in proper use of assistive devices.                  Learning Progress Summary            Patient Acceptance, E,TB,D, VU,NR by DT at 12/31/2024 0928    Comment: Role of OT,goals & POC; safety prec, spinal prec, log rolling tech   Family Acceptance, E,TB,D, VU,NR by DT at 12/31/2024 8638    Comment: Role of OT,goals & POC; safety prec, spinal prec, log rolling tech                      Point: Home exercise program (Not Started)        Description:   Instruct learner(s) on appropriate technique for monitoring, assisting and/or progressing therapeutic exercises/activities.                  Learner Progress:  Not documented in this visit.              Point: Precautions (Done)       Description:   Instruct learner(s) on prescribed precautions during self-care and functional transfers.                  Learning Progress Summary            Patient Acceptance, E,TB,D, VU,NR by DT at 12/31/2024 1559    Comment: Role of OT,goals & POC; safety prec, spinal prec, log rolling tech   Family Acceptance, E,TB,D, VU,NR by DT at 12/31/2024 1559    Comment: Role of OT,goals & POC; safety prec, spinal prec, log rolling tech                      Point: Body mechanics (Done)       Description:   Instruct learner(s) on proper positioning and spine alignment during self-care, functional mobility activities and/or exercises.                  Learning Progress Summary            Patient Acceptance, E,TB,D, VU,NR by DT at 12/31/2024 1559    Comment: Role of OT,goals & POC; safety prec, spinal prec, log rolling tech   Family Acceptance, E,TB,D, VU,NR by DT at 12/31/2024 1559    Comment: Role of OT,goals & POC; safety prec, spinal prec, log rolling tech                                      User Key       Initials Effective Dates Name Provider Type Discipline    DT 07/11/23 -  Madie Fair, OT Occupational Therapist OT                  OT Recommendation and Plan  Planned Therapy Interventions (OT): activity tolerance training, BADL retraining, functional balance retraining, neuromuscular control/coordination retraining, occupation/activity based interventions, patient/caregiver education/training, ROM/therapeutic exercise, strengthening exercise, transfer/mobility retraining, adaptive equipment training  Therapy Frequency (OT): 5 times/wk  Plan of Care Review  Plan of Care Reviewed With: patient, mother  Outcome Evaluation: Pt is a 39-year-old female with about a  6-month history of back pain and lower extremity pain and numbness who was found to have benign-appearing intradural lesion at L5 resulting in severe stenosis and compression of the nerves within the thecal sac. Pt underwent  L4-S1 laminectomy and resection of intradural tumor on 12/30/24 & is POD #1. Pt cleared to participate with therapy by nursing after bedrest orders lifted at noon. PMH: Autism, bilateral ankle pain (pt wears bilateral ankle braces), mood d/o, depression.At baseline, her Mother, who is her paid caregiver ,assists pt with bathing, toileting, LB dressing & ADL transfers on/off commode & in/out of tub. Pt ambulates primarily with a rollator. She has 3:1 commode, tub seat, RW, w/c & elec w/c PRN in home. Upon eval, pt is A&O X4. She c/o pain along back at surgical incision site as 10/10 to begin which improved to 7/10 after eval. Pt required max A X2 with max v.c. for log rolling tech, min A for sit<>stand with RW & to take steps laterally to her left. She required min A for dynamic sitting balance which improved to CGA with time & min A for standing balance. Pt is requiring max A for LB ADLs & min A for UB dressing tasks. OT will continue to work with pt. Discussed d/c options with pt & her mother as she would benefit from skilled rehab placement; however, both pt & Mother expressed that they are not agreeable to going to a facility, but would be agreeable to home with family assist & outpt therapy.     Time Calculation:         Time Calculation- OT       Row Name 12/31/24 1600             Time Calculation- OT    OT Start Time 1350  -DT      OT Stop Time 1437  -DT      OT Time Calculation (min) 47 min  -DT      Total Timed Code Minutes- OT 17 minute(s)  -DT      OT Received On 12/31/24  -DT      OT - Next Appointment 01/02/25  -DT      OT Goal Re-Cert Due Date 01/14/25  -DT                User Key  (r) = Recorded By, (t) = Taken By, (c) = Cosigned By      Initials Name Provider Type    RADHA Fair  Madie Egan, OT Occupational Therapist                           Madie Fair, OT  12/31/2024

## 2024-12-31 NOTE — PLAN OF CARE
Goal Outcome Evaluation:  Plan of Care Reviewed With: patient, parent           Outcome Evaluation: Pt is a 39-year-old female with about a 6-month history of back pain and lower extremity pain and numbness who was found to have benign-appearing intradural lesion at L5 resulting in severe stenosis and compression of the nerves within the thecal sac. Pt underwent L4-S1 laminectomy and resection of intradural tumor on 12/30/24 & is POD #1. Pt cleared to participate with therapy by nursing after bedrest orders lifted at noon. PMH: Autism, bilateral ankle pain  and weakness(pt wears bilateral ankle braces), mood d/o, depression.At baseline, her Mother, who is her paid caregiver ,assists pt with bathing, toileting, LB dressing & ADL transfers on/off commode & in/out of tub. Pt ambulates primarily with a rollator. She has 3:1 commode, tub seat, RW, w/c & elec w/c PRN in home. Upon eval, pt is A&O X4. She c/o pain along back at surgical incision site as 10/10 to begin which improved to 7/10 after eval. Pt required max A X2 with max v.c. for log rolling tech, min A for sit<>stand with RW & to take steps laterally to her left. She required min A for dynamic sitting balance which improved to CGA with time & min A for standing balance. PT will continue to work with pt. Discussed d/c options with pt & her mother as she would benefit from AIR; however, both pt & Mother expressed that they are not agreeable to going to a facility, but they are agreeable to home with family assist & outpt therapy.    Anticipated Discharge Disposition (PT): home with outpatient therapy services, home with 24/7 care

## 2024-12-31 NOTE — PROGRESS NOTES
Neurosurgery Progress Note      Patient: Elyssa Olivarez    YOB: 1985    Date of Admission: 12/30/2024  5:41 AM    Status Post: Lumbar 4 to sacral 1 laminectomy for resection of intradural tumor    Post Operative Day Number: 1 Day Post-Op      Subjective     Interval history:  No adverse events overnight.  Patient resting comfortably.  Pain well-controlled.  Moves all extremities.  Continues to remain flat on CSF leak precautions        Objective   Vitals:    12/30/24 1623 12/30/24 2214 12/31/24 0100 12/31/24 0528   BP: 105/70 121/78 120/77 135/87   BP Location: Left arm Left arm Left arm Left arm   Patient Position: Lying Lying Lying Lying   Pulse: 96 99 88 111   Resp: 12 15 22 18   Temp: 97.9 °F (36.6 °C) 98.3 °F (36.8 °C) 98 °F (36.7 °C) 97.6 °F (36.4 °C)   TempSrc: Oral Oral Oral Axillary   SpO2: 96% 95% 96% 93%   Weight:       Height:           Physical exam    General  - awake, cooperative, in no acute distress  Respiratory  - Normal respiratory rate and effort, no audible wheezes  Skin  - Surgical incision CDI with no swelling redness or drainage  NEUROLOGIC  - Moves all extremities  - Sensation intact throughout      Results review  CBC          12/6/2024    01:03 12/19/2024    11:43 12/31/2024    01:22   CBC   WBC 10.08  9.71  13.22    RBC 4.63  4.99  4.20    Hemoglobin 13.3  15.2  12.6    Hematocrit 41.4  44.5  37.5    MCV 89.4  89.2  89.3    MCH 28.7  30.5  30.0    MCHC 32.1  34.2  33.6    RDW 12.2  12.4  11.8    Platelets 312  329  313        BMP          12/6/2024    01:03 12/19/2024    11:43 12/31/2024    01:22   BMP   BUN 12  9  6    Creatinine 0.65  0.51  0.58    Sodium 138  139  140    Potassium 4.3  4.0  4.2    Chloride 101  100  102    CO2 26.9  27.6  25.7    Calcium 9.8  10.4  9.4              Assessment     MDM:  39 y.o. female 1 day status post L4-S1 laminectomy for resection of intradural tumor.  Patient is neurologically intact.  Pain relatively well-controlled.  Has been  flat overnight on CSF leak precautions.  We will try raising the head of bed slowly around noon today and begin working with PT/OT if she does not develop postural headaches.    Plan     - Continue to lay flat until noon today  - At noon, raise HOB to 30 degrees for 30 minutes  - Then raise HOB to 45 degrees for 30 minutes  - If patient tolerates this well with no postural headaches then okay to work with PT/OT  - If patient develops postural headaches will need to return to lying flat  - Continue incisional care  - Continue pain regimen  - Discharge plans pending HOB trial and PT/OT assessment  - Call with any questions or concerns      I discussed my assessment and recommendations with Dr. Michael Ga PA-C  12/31/2024  07:34 EST

## 2024-12-31 NOTE — THERAPY EVALUATION
Patient Name: Elyssa Olivarez  : 1985    MRN: [id number]                              Today's Date: 2024       Admit Date: 2024    Visit Dx:     ICD-10-CM ICD-9-CM   1. Intradural extramedullary spinal tumor  D49.7 239.7     Patient Active Problem List   Diagnosis    Acne    Allergic rhinitis    Autism    Ankle pain    Dilated pupil    Displaced bimalleolar fracture of left lower leg, initial encounter for closed fracture    Fatigue    Cholelithiasis    Gastroesophageal reflux disease    Headache    Hyperglycemia    Mixed hyperlipidemia    Anxiety disorder    Depression    Mood disorder    Class 2 severe obesity with serious comorbidity and body mass index (BMI) of 35.0 to 35.9 in adult    Thyroid nodule    Tonic pupillary reaction    Impacted cerumen of right ear    Chronic midline thoracic back pain    Sciatic nerve pain, right    Candidal intertrigo    Medicare annual wellness visit, subsequent    RLS (restless legs syndrome)    Vitamin D deficiency    Carbuncle    Abscess of left buttock    Hip dysplasia    Back pain    Radicular leg pain    Abnormal MRI, lumbar spine    Intradural extramedullary spinal tumor    Spinal cord tumor     Past Medical History:   Diagnosis Date    Allergic     Ankle pain, chronic     right    Autism disorder     Depression     Goiter     Hearing loss     Hip dysplasia, congenital     Left    Hyperglycemia     Hyperlipidemia     Migraine headache     Mood disorder     MRSA infection     Obesity     Partial hearing loss     Pupil dilation     right    Right foot pain     Chronic     Past Surgical History:   Procedure Laterality Date    ANKLE SURGERY Bilateral     GALLBLADDER SURGERY      HYSTERECTOMY      LUMBAR SPINAL TUMOR REMOVAL N/A 2024    Procedure: Lumbar 4 to sacral 1 laminectomy for resection of intradural tumor;  Surgeon: Delmar Rodriguez IV, MD;  Location: [facility] MAIN OR;  Service: Neurosurgery;  Laterality: N/A;    TYMPANOPLASTY        General  Information       [facility] Name 12/31/24 1641          Physical Therapy Time and Intention    Document Type evaluation  -     Mode of Treatment physical therapy  -[facility] Name 12/31/24 1641          General Information    Patient Profile Reviewed yes  -     Prior Level of Function min assist:;mod assist:;all household mobility  Pt's Mom who is her paid caregiver. Mom assists with bathing, dressing, toileting. She ambulates with rollator, but also has a RW, w/c, motorized w/c PRN. They have a tub with tub seat & 3:1 commode placed over the toilet. hx of recent falls.  -     Existing Precautions/Restrictions spinal;other (see comments)  hx of low tone per mom's report. Pt has B ankle braces in the room.  -     Barriers to Rehab medically complex;previous functional deficit  -[facility] Name 12/31/24 1641          Living Environment    People in Home parent(s)  -[facility] Name 12/31/24 1641          Home Main Entrance    Number of Stairs, Main Entrance none  -[facility] Name 12/31/24 1641          Cognition    Orientation Status (Cognition) oriented x 4  -[facility] Name 12/31/24 1641          Safety Issues/Impairments Affecting Functional Mobility    Impairments Affecting Function (Mobility) balance;endurance/activity tolerance;pain;sensation/sensory awareness  -               User Key  (r) = Recorded By, (t) = Taken By, (c) = Cosigned By      Initials Name Provider Type     Fatemeh Gutierrez PT Physical Therapist                   Mobility       [facility] Name 12/31/24 1642          Bed Mobility    Bed Mobility supine-sit;sit-supine  -     Supine-Sit [place] (Bed Mobility) maximum assist (25% patient effort);2 person assist;verbal cues  -     Sit-Supine [place] (Bed Mobility) maximum assist (25% patient effort);verbal cues;2 person assist  -     Comment, (Bed Mobility) tactile and verbal cues for logroll  -[facility] Name 12/31/24 1642          Sit-Stand Transfer    Sit-Stand  [place] (Transfers) minimum assist (75% patient effort);contact guard;2 person assist  -     Assistive Device (Sit-Stand Transfers) walker, front-wheeled  -[facility] Name 12/31/24 1642          Gait/Stairs (Locomotion)    [place] Level (Gait) contact guard;minimum assist (75% patient effort);2 person assist  -     Assistive Device (Gait) walker, front-wheeled  -     Patient was able to Ambulate yes  -     Distance in Feet (Gait) 2  -     Deviations/Abnormal Patterns (Gait) mitchell decreased;base of support, wide;stride length decreased  -               User Key  (r) = Recorded By, (t) = Taken By, (c) = Cosigned By      Initials Name Provider Type     Fatemeh Gutierrez, PT Physical Therapist                   Obj/Interventions       [facility] Name 12/31/24 1643          Range of Motion Comprehensive    Comment, General Range of Motion grossly WFL.  -AH       Row Name 12/31/24 1643          Strength Comprehensive (MMT)    Comment, General Manual Muscle Testing (MMT) Assessment RLE grossly 4/5, LLE grossly 4-/5.  -AH       Row Name 12/31/24 1643          Motor Skills    Motor Skills functional endurance  -     Functional Endurance fair  -AH       Row Name 12/31/24 1643          Balance    Balance Assessment sitting static balance;sitting dynamic balance;standing static balance;standing dynamic balance  -     Static Sitting Balance contact guard  -     Dynamic Sitting Balance minimal assist;contact guard  -     Position, Sitting Balance sitting edge of bed  -     Static Standing Balance minimal assist  -     Dynamic Standing Balance minimal assist  -     Position/Device Used, Standing Balance walker, front-wheeled  -AH       Row Name 12/31/24 1643          Sensory Assessment (Somatosensory)    Sensory Assessment (Somatosensory) --  LLE with impaired sensation intermittently per pt report.  -               User Key  (r) = Recorded By, (t) = Taken By, (c) = Cosigned By      Initials Name  Provider Type    Fatemeh Mast, PT Physical Therapist                   Goals/Plan       [facility] Name 12/31/24 1647          Bed Mobility Goal 1 (PT)    Activity/Assistive Device (Bed Mobility Goal 1, PT) bed mobility activities, all  -     [place] Level/Cues Needed (Bed Mobility Goal 1, PT) standby assist  -     Time Frame (Bed Mobility Goal 1, PT) long term goal (LTG)  -AH       Row Name 12/31/24 1647          Transfer Goal 1 (PT)    Activity/Assistive Device (Transfer Goal 1, PT) transfers, all;sit-to-stand/stand-to-sit;bed-to-chair/chair-to-bed;toilet  -     [place] Level/Cues Needed (Transfer Goal 1, PT) standby assist  -     Time Frame (Transfer Goal 1, PT) long term goal (LTG);2 weeks  -AH       Row Name 12/31/24 1647          Gait Training Goal 1 (PT)    Activity/Assistive Device (Gait Training Goal 1, PT) gait (walking locomotion);assistive device use  -     [place] Level (Gait Training Goal 1, PT) standby assist  -     Time Frame (Gait Training Goal 1, PT) long term goal (LTG);2 weeks  -AH       Row Name 12/31/24 1647          Balance Goal 1 (PT)    Activity/Assistive Device (Balance Goal) standing static balance;standing dynamic balance;walker, rolling  -     [place] Level/Cues Needed (Balance Goal 1, PT) supervision required  -     Time Frame (Balance Goal 1, PT) long-term goal (LTG);2 weeks  -AH       Row Name 12/31/24 1647          Therapy Assessment/Plan (PT)    Planned Therapy Interventions (PT) balance training;bed mobility training;gait training;home exercise program;transfer training;strengthening;patient/family education;neuromuscular re-education  -               User Key  (r) = Recorded By, (t) = Taken By, (c) = Cosigned By      Initials Name Provider Type    Fatemeh Mast, PT Physical Therapist                   Clinical Impression       [facility] Name 12/31/24 1644          Pain    Pretreatment Pain Rating 10/10  -     Posttreatment Pain Rating 7/10  -      Pain Location back  -     Pain Side/Orientation lower  -     Pain Management Interventions activity modification encouraged;positioning techniques utilized  [facility]     Response to Pain Interventions activity participation with decreased pain  -       Row Name 12/31/24 0503          Plan of Care Review    Plan of Care Reviewed With patient;parent  -     Outcome Evaluation Pt is a 39-year-old female with about a 6-month history of back pain and lower extremity pain and numbness who was found to have benign-appearing intradural lesion at L5 resulting in severe stenosis and compression of the nerves within the thecal sac. Pt underwent L4-S1 laminectomy and resection of intradural tumor on 12/30/24 & is POD #1. Pt cleared to participate with therapy by nursing after bedrest orders lifted at noon. PMH: Autism, bilateral ankle pain  and weakness(pt wears bilateral ankle braces), mood d/o, depression.At baseline, her Mother, who is her paid caregiver ,assists pt with bathing, toileting, LB dressing & ADL transfers on/off commode & in/out of tub. Pt ambulates primarily with a rollator. She has 3:1 commode, tub seat, RW, w/c & elec w/c PRN in home. Upon eval, pt is A&O X4. She c/o pain along back at surgical incision site as 10/10 to begin which improved to 7/10 after eval. Pt required max A X2 with max v.c. for log rolling tech, min A for sit<>stand with RW & to take steps laterally to her left. She required min A for dynamic sitting balance which improved to CGA with time & min A for standing balance. PT will continue to work with pt. Discussed d/c options with pt & her mother as she would benefit from AIR; however, both pt & Mother expressed that they are not agreeable to going to a facility, but they are agreeable to home with family assist & outpt therapy.  -       Row Name 12/31/24 2551          Therapy Assessment/Plan (PT)    Rehab Potential (PT) good  -     Criteria for Skilled Interventions Met (PT)  yes;meets criteria  -     Therapy Frequency (PT) 5 times/wk  -       Row Name 12/31/24 1644          Positioning and Restraints    Post Treatment Position bed  -     In Bed notified nsg;fowlers;encouraged to call for assist;call light within reach;exit alarm on  -               User Key  (r) = Recorded By, (t) = Taken By, (c) = Cosigned By      Initials Name Provider Type     Fatemeh Gutierrez, SARAH Physical Therapist                   Outcome Measures       Row Name 12/31/24 1647 12/31/24 0710       How much help from another person do you currently need...    Turning from your back to your side while in flat bed without using bedrails? 2  - 1  -    Moving from lying on back to sitting on the side of a flat bed without bedrails? 2  - 1  -EH    Moving to and from a bed to a chair (including a wheelchair)? 2  - 1  -EH    Standing up from a chair using your arms (e.g., wheelchair, bedside chair)? 2  - 1  -EH    Climbing 3-5 steps with a railing? 1  - 1  -    To walk in hospital room? 2  - 1  -    AM-PAC 6 Clicks Score (PT) 11  - 6  -    Highest Level of Mobility Goal 4 --> Transfer to chair/commode  - 2 --> Bed activities/dependent transfer  -      Row Name 12/31/24 1647          Functional Assessment    Outcome Measure Options AM-PAC 6 Clicks Basic Mobility (PT)  -               User Key  (r) = Recorded By, (t) = Taken By, (c) = Cosigned By      Initials Name Provider Type     Jarod Brenner, AUNG Registered Nurse     Fatemeh Gutierrez, PT Physical Therapist                                 Physical Therapy Education       Title: PT OT SLP Therapies (In Progress)       Topic: Physical Therapy (Done)       Point: Mobility training (Done)       Learning Progress Summary            Patient Acceptance, E, VU,NR by  at 12/31/2024 1647                      Point: Home exercise program (Done)       Learning Progress Summary            Patient Acceptance, E, VU,NR by  at 12/31/2024 1647                       Point: Body mechanics (Done)       Learning Progress Summary            Patient Acceptance, E, VU,NR by  at 12/31/2024 1647                      Point: Precautions (Done)       Learning Progress Summary            Patient Acceptance, E, VU,NR by  at 12/31/2024 1647                                      User Key       Initials Effective Dates Name Provider Type Discipline     12/04/23 -  Fatemeh Gutierrez PT Physical Therapist PT                  PT Recommendation and Plan  Planned Therapy Interventions (PT): balance training, bed mobility training, gait training, home exercise program, transfer training, strengthening, patient/family education, neuromuscular re-education  Outcome Evaluation: Pt is a 39-year-old female with about a 6-month history of back pain and lower extremity pain and numbness who was found to have benign-appearing intradural lesion at L5 resulting in severe stenosis and compression of the nerves within the thecal sac. Pt underwent L4-S1 laminectomy and resection of intradural tumor on 12/30/24 & is POD #1. Pt cleared to participate with therapy by nursing after bedrest orders lifted at noon. PMH: Autism, bilateral ankle pain  and weakness(pt wears bilateral ankle braces), mood d/o, depression.At baseline, her Mother, who is her paid caregiver ,assists pt with bathing, toileting, LB dressing & ADL transfers on/off commode & in/out of tub. Pt ambulates primarily with a rollator. She has 3:1 commode, tub seat, RW, w/c & elec w/c PRN in home. Upon eval, pt is A&O X4. She c/o pain along back at surgical incision site as 10/10 to begin which improved to 7/10 after eval. Pt required max A X2 with max v.c. for log rolling tech, min A for sit<>stand with RW & to take steps laterally to her left. She required min A for dynamic sitting balance which improved to CGA with time & min A for standing balance. PT will continue to work with pt. Discussed d/c options with pt & her mother as she  would benefit from AIR; however, both pt & Mother expressed that they are not agreeable to going to a facility, but they are agreeable to home with family assist & outpt therapy.     Time Calculation:         PT Charges       Row Name 12/31/24 1648             Time Calculation    Start Time 1350  -      Stop Time 1438  -      Time Calculation (min) 48 min  -      PT Non-Billable Time (min) 0 min  -      PT Received On 12/31/24  -      PT - Next Appointment 01/02/25  -      PT Goal Re-Cert Due Date 01/14/25  -         Time Calculation- PT    Total Timed Code Minutes- PT 0 minute(s)  -                User Key  (r) = Recorded By, (t) = Taken By, (c) = Cosigned By      Initials Name Provider Type     Fatemeh Gutierrez, SARAH Physical Therapist                  Therapy Charges for Today       Code Description Service Date Service Provider Modifiers Qty    [id number] HC-PT EVAL MOD COMPLEXITY 5 12/31/2024 Fatemeh Gutierrez, PT  1            PT G-Codes  Outcome Measure Options: AM-PAC 6 Clicks Basic Mobility (PT)  AM-PAC 6 Clicks Score (PT): 11  PT Discharge Summary  Anticipated Discharge Disposition (PT): home with outpatient therapy services, home with 24/7 care    Fatemeh Gutierrez PT  12/31/2024

## 2024-12-31 NOTE — CASE MANAGEMENT/SOCIAL WORK
Discharge Planning Assessment   [facility]     Patient Name: Elyssa Olivarez  MRN: [id number]  Today's Date: 12/31/2024    Admit Date: 12/30/2024    Plan: Home. Family can transport at discharge.   Discharge Needs Assessment       Row Name 12/31/24 1529       Living Environment    People in Home parent(s)    Name(s) of People in Home Lexie Duran    Current Living Arrangements home    Potentially Unsafe Housing Conditions none    In the past 12 months has the electric, gas, oil, or water company threatened to shut off services in your home? No    Primary Care Provided by self;parent(s)    Provides Primary Care For no one    Family Caregiver if Needed parent(s)    Family Caregiver Names Lexie Duran    Quality of Family Relationships helpful;involved;supportive    Able to Return to Prior Arrangements yes       Resource/Environmental Concerns    Resource/Environmental Concerns none    Transportation Concerns none       Transportation Needs    In the past 12 months, has lack of transportation kept you from medical appointments or from getting medications? no    In the past 12 months, has lack of transportation kept you from meetings, work, or from getting things needed for daily living? No       Food Insecurity    Within the past 12 months, you worried that your food would run out before you got the money to buy more. Never true    Within the past 12 months, the food you bought just didn't last and you didn't have money to get more. Never true       Transition Planning    Patient/Family Anticipates Transition to home with family    Patient/Family Anticipated Services at Transition none    Transportation Anticipated family or friend will provide       Discharge Needs Assessment    Readmission Within the Last 30 Days no previous admission in last 30 days    Equipment Currently Used at Home bath bench;commode;walker, rolling;rollator;wheelchair, motorized    Concerns to be Addressed care coordination/care  conferences;discharge planning    Anticipated Changes Related to Illness none    Equipment Needed After Discharge none    Provided Post Acute Provider List? N/A    N/A Provider List Comment denies dc needs                   Discharge Plan       Row Name 12/31/24 1530       Plan    Plan Home. Family can transport at discharge.    Patient/Family in Agreement with Plan yes    Plan Comments Patient lives at home with her parents. Family  will transport at discharge. Patient performs ADL's with assistance from her mom who is also paid caregiver through [facility] .  PCP and pharmacy confirmed. Denies financial assistance needs for medication and/or food. Denies HH and/or rehab needs.                  Continued Care and Services - Admitted Since 12/30/2024    No active coordination exists for this encounter.       Selected Continued Care - Prior Encounters Includes continued care and service providers with selected services from prior encounters from 10/1/2024 to 12/31/2024      Discharged on 12/6/2024 Admission date: 12/4/2024 - Discharge disposition: Home or Self Care      Home Medical Care       Service Provider Services Address Phone Fax Patient Preferred    [facility] [address] [place] [address] [phone] --       Internal Comment last updated by Haylee Elliott 12/6/2024 1122    Home  health aide/ caregiver                         Therapy       Service Provider Services Address Phone Fax Patient Preferred    [facility] PHYSICAL THERAPY [facility] [address] [facility] [place] [address] [phone] --       Internal Comment last updated by Haylee Elliott 12/6/2024 1140    For PT                                      Expected Discharge Date and Time       Expected Discharge Date Expected Discharge Time    Jan 1, 2025            Demographic Summary       Row Name  12/31/24 1528       General Information    Admission Type inpatient    Arrived From home;PACU/recovery room    Required Notices Provided Important Message from Medicare    Referral Source admission list    Reason for Consult discharge planning    Preferred Language English       Contact Information    Permission Granted to Share Info With                    Functional Status       Row Name 12/31/24 1528       Functional Status    Usual Activity Tolerance fair    Current Activity Tolerance fair       Functional Status, IADL    Medications assistive person    Meal Preparation assistive person    Housekeeping assistive person    Laundry assistive person    Shopping assistive person    If for any reason you need help with day-to-day activities such as bathing, preparing meals, shopping, managing finances, etc., do you get the help you need? I get all the help I need       Mental Status    General Appearance WDL WDL       Mental Status Summary    Recent Changes in Mental Status/Cognitive Functioning no changes                     Patient Forms       Row Name 12/31/24 1333       Patient Forms    Important Message from Medicare (IMM) Delivered  IMM del 12/30/24 KV elvis Doty RN    SIPS 1  [email or web address]  Office [phone]  Cell [phone]

## 2024-12-31 NOTE — PROGRESS NOTES
"    [facility] MEDICINE SERVICE  DAILY PROGRESS NOTE    NAME: Elyssa Olivarez  : 1985  MRN: [id number]      LOS: 1 day     PROVIDER OF SERVICE: Blair Bhakta MD    Chief Complaint: Intradural extramedullary spinal tumor    Subjective:     Interval History:  History taken from: patient chart      Per the documentation by Delmar Rodriguez IV, MD, dated 24,  \"39-year-old female with about a 6-month history of back pain and lower extremity pain and numbness who was found to have benign-appearing intradural lesion in the lower lumbar spine resulting in severe stenosis and compression of the nerves within the thecal sac. Given these findings patient was taken to the OR for L4-S1 laminectomy and resection of intradural tumor. \"  Hospitalist team consulted for med mgmt.   Pt seen following procedure, sleeping but easily rousable to touch. States she is starting to feel some pain at her op site, otherwise no acute complaints.      -seen In bed NAD, VSS, continues bedrest until noon 24.  patient tolerating diet, On IV antibiotics, nystatin powder started for rash under breast and abdominal/groin folds. F/C to [facility]. VSS, no concerns or complaints at this time.     Review of Systems  Constitutional:  Negative for activity change, appetite change, chills and fever.   HENT: Negative.     Eyes: Negative.    Respiratory:  Negative for cough, chest tightness, shortness of breath and wheezing.    Cardiovascular:  Negative for chest pain, palpitations and leg swelling.   Gastrointestinal:  Negative for abdominal pain, constipation, diarrhea and nausea.   Musculoskeletal:         + pain at op site   Neurological:  Negative for dizziness, speech difficulty, light-headedness and headaches.   Objective:     Vital Signs  Temp:  [97.5 °F (36.4 °C)-98.3 °F (36.8 °C)] 97.5 °F (36.4 °C)  Heart Rate:  [] 97  Resp:  [8-24] 24  BP: ()/(47-96) 150/77  Flow (L/min) (Oxygen Therapy):  [2-6] 2   Body " mass index is 44.08 kg/m².    Physical Exam        Appearance: Normal appearance.   HENT:      Head: Normocephalic and atraumatic.      Mouth/Throat:      Mouth: Mucous membranes are moist.      Pharynx: Oropharynx is clear.   Eyes:      Extraocular Movements: Extraocular movements intact.      Conjunctiva/sclera: Conjunctivae normal.      Pupils: Pupils are equal, round, and reactive to light.   Cardiovascular:      Rate and Rhythm: Normal rate and regular rhythm.      Pulses: Normal pulses.      Heart sounds: Normal heart sounds.   Pulmonary:      Effort: Pulmonary effort is normal.      Breath sounds: No wheezing, rhonchi or rales.   Abdominal:      General: Abdomen is flat. Bowel sounds are normal.      Palpations: Abdomen is soft.      Tenderness: There is no abdominal tenderness. There is no guarding.   Musculoskeletal:      Cervical back: Normal range of motion and neck supple.      Right lower leg: No edema.      Left lower leg: No edema.   Neurological:      General: No focal deficit present.      Mental Status: She is alert and oriented to person, place, and time. Mental status is at baseline.        Diagnostic Data    Results from last 7 days   Lab Units 12/31/24  0122   WBC 10*3/mm3 13.22*   HEMOGLOBIN g/dL 12.6   HEMATOCRIT % 37.5   PLATELETS 10*3/mm3 313   GLUCOSE mg/dL 189*   CREATININE mg/dL 0.58   BUN mg/dL 6   SODIUM mmol/L 140   POTASSIUM mmol/L 4.2   ANION GAP mmol/L 12.3       No radiology results for the last day      I reviewed the patient's new clinical results.    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          Intradural extramedullary spinal tumor  - s/p resection 12/30/24  - cefazolin given for ppx   - pain control   Per spine-Continue to lay flat until noon today  - At noon,  raise HOB to 30 degrees for 30 minutes  - Then raise HOB to 45 degrees for 30 minutes  - If patient tolerates this well with no postural headaches then okay to work with PT/OT  - If patient develops postural headaches will need to return to lying flat  - Continue incisional care  - Continue pain regimen  - Discharge plans pending HOB trial and PT/OT assessmen     Hyperlipidemia  - cont home atorvastatin, holding zetia as not on formulary     Anxiety  Depression  - Cont home sertraline, prn ativan     Seizures  - Hx absence seizures, well controlled  - cont home lamotrigine     Insomnia  - Melatonin  - cont home trazodone      GERD  - cont home protonix      VTE Prophylaxis:  Pharmacologic & mechanical VTE prophylaxis orders are present.      Disposition Planning:     Barriers to Discharge:neck pain  Anticipated Date of Discharge: 1/1  Place of Discharge: home      Time: 30 minutes     There are no questions and answers to display.       Signature: Electronically signed by Blair Bhakta MD, 12/31/24, 09:49 EST.  [facility] Hospitalist Team

## 2025-01-01 PROCEDURE — 99024 POSTOP FOLLOW-UP VISIT: CPT | Performed by: NEUROLOGICAL SURGERY

## 2025-01-01 PROCEDURE — 25010000002 ENOXAPARIN PER 10 MG

## 2025-01-01 PROCEDURE — 25010000002 HYDROMORPHONE 1 MG/ML SOLUTION

## 2025-01-01 RX ADMIN — CYCLOBENZAPRINE 10 MG: 10 TABLET, FILM COATED ORAL at 00:17

## 2025-01-01 RX ADMIN — LAMOTRIGINE 150 MG: 100 TABLET ORAL at 22:10

## 2025-01-01 RX ADMIN — HYDROCODONE BITARTRATE AND ACETAMINOPHEN 2 TABLET: 5; 325 TABLET ORAL at 05:40

## 2025-01-01 RX ADMIN — ENOXAPARIN SODIUM 40 MG: 100 INJECTION SUBCUTANEOUS at 08:27

## 2025-01-01 RX ADMIN — Medication 10 ML: at 22:11

## 2025-01-01 RX ADMIN — GABAPENTIN 100 MG: 100 CAPSULE ORAL at 16:10

## 2025-01-01 RX ADMIN — GABAPENTIN 100 MG: 100 CAPSULE ORAL at 08:26

## 2025-01-01 RX ADMIN — HYDROCODONE BITARTRATE AND ACETAMINOPHEN 2 TABLET: 5; 325 TABLET ORAL at 14:43

## 2025-01-01 RX ADMIN — ACETAMINOPHEN 650 MG: 325 TABLET, FILM COATED ORAL at 21:16

## 2025-01-01 RX ADMIN — NYSTATIN: 100000 POWDER TOPICAL at 22:55

## 2025-01-01 RX ADMIN — SERTRALINE 100 MG: 100 TABLET, FILM COATED ORAL at 08:27

## 2025-01-01 RX ADMIN — ATORVASTATIN CALCIUM 80 MG: 40 TABLET, FILM COATED ORAL at 08:26

## 2025-01-01 RX ADMIN — LORAZEPAM 1 MG: 1 TABLET ORAL at 00:17

## 2025-01-01 RX ADMIN — GABAPENTIN 100 MG: 100 CAPSULE ORAL at 22:10

## 2025-01-01 RX ADMIN — CYCLOBENZAPRINE 10 MG: 10 TABLET, FILM COATED ORAL at 08:27

## 2025-01-01 RX ADMIN — LORAZEPAM 1 MG: 1 TABLET ORAL at 18:08

## 2025-01-01 RX ADMIN — LORAZEPAM 1 MG: 1 TABLET ORAL at 12:08

## 2025-01-01 RX ADMIN — HYDROMORPHONE HYDROCHLORIDE 0.25 MG: 1 INJECTION, SOLUTION INTRAMUSCULAR; INTRAVENOUS; SUBCUTANEOUS at 08:27

## 2025-01-01 RX ADMIN — HYDROCODONE BITARTRATE AND ACETAMINOPHEN 2 TABLET: 5; 325 TABLET ORAL at 00:17

## 2025-01-01 RX ADMIN — NYSTATIN: 100000 POWDER TOPICAL at 08:27

## 2025-01-01 RX ADMIN — ENOXAPARIN SODIUM 40 MG: 100 INJECTION SUBCUTANEOUS at 22:10

## 2025-01-01 RX ADMIN — PANTOPRAZOLE SODIUM 40 MG: 40 TABLET, DELAYED RELEASE ORAL at 08:27

## 2025-01-01 RX ADMIN — CYCLOBENZAPRINE 10 MG: 10 TABLET, FILM COATED ORAL at 22:41

## 2025-01-01 RX ADMIN — Medication 10 ML: at 08:27

## 2025-01-01 RX ADMIN — LAMOTRIGINE 150 MG: 100 TABLET ORAL at 08:26

## 2025-01-01 RX ADMIN — TRAZODONE HYDROCHLORIDE 50 MG: 50 TABLET ORAL at 22:10

## 2025-01-01 RX ADMIN — LORAZEPAM 1 MG: 1 TABLET ORAL at 05:40

## 2025-01-01 NOTE — PROGRESS NOTES
LOS: 2 days   Patient Care Team:  Vimal Moseley PA-C as PCP - General (Physician Assistant)    Chief Complaint: Postop lumbar tumor removal    Subjective     Patient feels pretty good overall.  She has no headache.  She is however still requiring the assistance of 2 according to the physical therapy notes in order to walk.  Does not feel comfortable going home nor does her mother.    Interval History:     History taken from: patient chart    Objective      She has good movement of both lower extremities    Vital Signs  Temp:  [98.3 °F (36.8 °C)-98.7 °F (37.1 °C)] 98.3 °F (36.8 °C)  Heart Rate:  [103] 103  Resp:  [15-20] 19  BP: (100-115)/(69-72) 115/72       Results Review:     I reviewed the patient's new clinical results.  She is afebrile with a white count of 13.2.      Assessment & Plan       Intradural extramedullary spinal tumor    Spinal cord tumor      I told the patient to walk is much as possible today.  As soon as she is able to walk to where her mother is comfortable with her then we can discharge.      Kaleb Steward MD  01/01/25  09:22 EST

## 2025-01-01 NOTE — PLAN OF CARE
Goal Outcome Evaluation:              Outcome Evaluation: Pt VSS,Family at bedside,Pt has been able to void this shift,Pain treated per MAR,Assist X 1 with thw walker,Pt is able to make needs known. Calllight in reach,Plan on going

## 2025-01-01 NOTE — PLAN OF CARE
Goal Outcome Evaluation:                               Patient with complaints of back pain, treated per MAR. Walking around the unit with her mother demonstrating good mobility. Able to make needs known, plan of care ongoing.

## 2025-01-01 NOTE — PROGRESS NOTES
"    [facility] MEDICINE SERVICE  DAILY PROGRESS NOTE    NAME: Elyssa Olivarez  : 1985  MRN: [id number]      LOS: 2 days     PROVIDER OF SERVICE: Blair Bhakta MD    Chief Complaint: Intradural extramedullary spinal tumor    Subjective:     Interval History:  History taken from: patient chart    Per the documentation by Delmar Rodriguez IV, MD, dated 24,  \"39-year-old female with about a 6-month history of back pain and lower extremity pain and numbness who was found to have benign-appearing intradural lesion in the lower lumbar spine resulting in severe stenosis and compression of the nerves within the thecal sac. Given these findings patient was taken to the OR for L4-S1 laminectomy and resection of intradural tumor. \"  Hospitalist team consulted for med mgmt.   Pt seen following procedure, sleeping but easily rousable to touch. States she is starting to feel some pain at her op site, otherwise no acute complaints.      -seen In bed NAD, VSS, continues bedrest until noon 24.  patient tolerating diet, On IV antibiotics, nystatin powder started for rash under breast and abdominal/groin folds. F/C to [facility]. VSS, no concerns or complaints at this time.   25-Patient denies any new complaints, vital signs stable, surgery told the patient to walk is much as possible today. As soon as she is able to walk to where her mother is comfortable with her then we can discharge.     Review of Systems  Constitutional:  Negative for activity change, appetite change, chills and fever.   HENT: Negative.     Eyes: Negative.    Respiratory:  Negative for cough, chest tightness, shortness of breath and wheezing.    Cardiovascular:  Negative for chest pain, palpitations and leg swelling.   Gastrointestinal:  Negative for abdominal pain, constipation, diarrhea and nausea.   Musculoskeletal:         + pain at op site   Neurological:  Negative for dizziness, speech difficulty, light-headedness and " headaches.   Objective:     Vital Signs  Temp:  [98.3 °F (36.8 °C)-98.7 °F (37.1 °C)] 98.3 °F (36.8 °C)  Heart Rate:  [103] 103  Resp:  [15-20] 19  BP: (100-115)/(69-72) 115/72   Body mass index is 44.08 kg/m².    Physical Exam        Appearance: Normal appearance.   HENT:      Head: Normocephalic and atraumatic.      Mouth/Throat:      Mouth: Mucous membranes are moist.      Pharynx: Oropharynx is clear.   Eyes:      Extraocular Movements: Extraocular movements intact.      Conjunctiva/sclera: Conjunctivae normal.      Pupils: Pupils are equal, round, and reactive to light.   Cardiovascular:      Rate and Rhythm: Normal rate and regular rhythm.      Pulses: Normal pulses.      Heart sounds: Normal heart sounds.   Pulmonary:      Effort: Pulmonary effort is normal.      Breath sounds: No wheezing, rhonchi or rales.   Abdominal:      General: Abdomen is flat. Bowel sounds are normal.      Palpations: Abdomen is soft.      Tenderness: There is no abdominal tenderness. There is no guarding.   Musculoskeletal:      Cervical back: Normal range of motion and neck supple.      Right lower leg: No edema.      Left lower leg: No edema.   Neurological:      General: No focal deficit present.      Mental Status: She is alert and oriented to person, place, and time. Mental status is at baseline.        Diagnostic Data    Results from last 7 days   Lab Units 12/31/24  0122   WBC 10*3/mm3 13.22*   HEMOGLOBIN g/dL 12.6   HEMATOCRIT % 37.5   PLATELETS 10*3/mm3 313   GLUCOSE mg/dL 189*   CREATININE mg/dL 0.58   BUN mg/dL 6   SODIUM mmol/L 140   POTASSIUM mmol/L 4.2   ANION GAP mmol/L 12.3       No radiology results for the last day      I reviewed the patient's new clinical results.    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           Intradural extramedullary spinal tumor  - s/p resection 12/30/24  - cefazolin given for ppx   - pain control   Per spine-Continue to lay flat until noon today  - At noon, raise HOB to 30 degrees for 30 minutes  - Then raise HOB to 45 degrees for 30 minutes  - If patient tolerates this well with no postural headaches then okay to work with PT/OT  - If patient develops postural headaches will need to return to lying flat  - Continue incisional care  - Continue pain regimen  - Discharge plans pending HOB trial and PT/OT assessmen     Hyperlipidemia  - cont home atorvastatin, holding zetia as not on formulary     Anxiety  Depression  - Cont home sertraline, prn ativan     Seizures  - Hx absence seizures, well controlled  - cont home lamotrigine     Insomnia  - Melatonin  - cont home trazodone      GERD  - cont home protonix      VTE Prophylaxis:  Pharmacologic & mechanical VTE prophylaxis orders are present.      Disposition Planning:     Barriers to Discharge:neck pain  Anticipated Date of Discharge: 1/1  Place of Discharge: home      Time: 30 minutes     There are no questions and answers to display.       Signature: Electronically signed by Blair Bhakta MD, 01/01/25, 09:54 EST.  [facility] Hospitalist Team

## 2025-01-02 ENCOUNTER — READMISSION MANAGEMENT (OUTPATIENT)
Dept: CALL CENTER | Facility: HOSPITAL | Age: 40
End: 2025-01-02
Payer: MEDICARE

## 2025-01-02 VITALS
OXYGEN SATURATION: 93 % | DIASTOLIC BLOOD PRESSURE: 81 MMHG | BODY MASS INDEX: 44.35 KG/M2 | RESPIRATION RATE: 18 BRPM | SYSTOLIC BLOOD PRESSURE: 116 MMHG | TEMPERATURE: 98.7 F | HEIGHT: 62 IN | HEART RATE: 98 BPM | WEIGHT: 241 LBS

## 2025-01-02 LAB
LAB AP CASE REPORT: NORMAL
LAB AP CLINICAL INFORMATION: NORMAL
LAB AP DIAGNOSIS COMMENT: NORMAL
LAB AP SPECIAL STAINS: NORMAL
Lab: NORMAL
PATH REPORT.FINAL DX SPEC: NORMAL
PATH REPORT.GROSS SPEC: NORMAL

## 2025-01-02 PROCEDURE — 99024 POSTOP FOLLOW-UP VISIT: CPT

## 2025-01-02 PROCEDURE — 25010000002 ENOXAPARIN PER 10 MG

## 2025-01-02 RX ORDER — HYDROCODONE BITARTRATE AND ACETAMINOPHEN 5; 325 MG/1; MG/1
1 TABLET ORAL EVERY 4 HOURS PRN
Qty: 30 TABLET | Refills: 0 | Status: SHIPPED | OUTPATIENT
Start: 2025-01-02 | End: 2025-01-13

## 2025-01-02 RX ORDER — CYCLOBENZAPRINE HCL 10 MG
10 TABLET ORAL NIGHTLY
Qty: 15 TABLET | Refills: 0 | Status: SHIPPED | OUTPATIENT
Start: 2025-01-02 | End: 2025-01-03 | Stop reason: SDUPTHER

## 2025-01-02 RX ORDER — ONDANSETRON 4 MG/1
4 TABLET, FILM COATED ORAL EVERY 8 HOURS PRN
Qty: 15 TABLET | Refills: 0 | Status: SHIPPED | OUTPATIENT
Start: 2025-01-02 | End: 2025-01-03 | Stop reason: SDUPTHER

## 2025-01-02 RX ADMIN — ENOXAPARIN SODIUM 40 MG: 100 INJECTION SUBCUTANEOUS at 08:41

## 2025-01-02 RX ADMIN — LORAZEPAM 1 MG: 1 TABLET ORAL at 06:05

## 2025-01-02 RX ADMIN — SERTRALINE 100 MG: 100 TABLET, FILM COATED ORAL at 08:41

## 2025-01-02 RX ADMIN — ATORVASTATIN CALCIUM 80 MG: 40 TABLET, FILM COATED ORAL at 08:41

## 2025-01-02 RX ADMIN — PANTOPRAZOLE SODIUM 40 MG: 40 TABLET, DELAYED RELEASE ORAL at 08:40

## 2025-01-02 RX ADMIN — HYDROCODONE BITARTRATE AND ACETAMINOPHEN 2 TABLET: 5; 325 TABLET ORAL at 03:53

## 2025-01-02 RX ADMIN — HYDROCODONE BITARTRATE AND ACETAMINOPHEN 2 TABLET: 5; 325 TABLET ORAL at 08:40

## 2025-01-02 RX ADMIN — LAMOTRIGINE 150 MG: 100 TABLET ORAL at 08:40

## 2025-01-02 RX ADMIN — GABAPENTIN 100 MG: 100 CAPSULE ORAL at 08:41

## 2025-01-02 RX ADMIN — CYCLOBENZAPRINE 10 MG: 10 TABLET, FILM COATED ORAL at 08:41

## 2025-01-02 NOTE — PROGRESS NOTES
"    [facility] MEDICINE SERVICE  DAILY PROGRESS NOTE    NAME: Elyssa Olivarez  : 1985  MRN: [id number]      LOS: 3 days     PROVIDER OF SERVICE: Blair Bhkata MD    Chief Complaint: Intradural extramedullary spinal tumor    Subjective:     Interval History:  History taken from: patient chart    Per the documentation by Delmar Rodriguez IV, MD, dated 24,  \"39-year-old female with about a 6-month history of back pain and lower extremity pain and numbness who was found to have benign-appearing intradural lesion in the lower lumbar spine resulting in severe stenosis and compression of the nerves within the thecal sac. Given these findings patient was taken to the OR for L4-S1 laminectomy and resection of intradural tumor. \"  Hospitalist team consulted for med mgmt.   Pt seen following procedure, sleeping but easily rousable to touch. States she is starting to feel some pain at her op site, otherwise no acute complaints.      -seen In bed NAD, VSS, continues bedrest until noon 24.  patient tolerating diet, On IV antibiotics, nystatin powder started for rash under breast and abdominal/groin folds. F/C to BS. VSS, no concerns or complaints at this time.   25-Patient denies any new complaints, vital signs stable, surgery told the patient to walk is much as possible today. As soon as she is able to walk to where her mother is comfortable with her then we can discharge.   25 seen in bed rakel QUEEN, ESSENCE RN. To [place] home.    Review of Systems  Constitutional:  Negative for activity change, appetite change, chills and fever.   HENT: Negative.     Eyes: Negative.    Respiratory:  Negative for cough, chest tightness, shortness of breath and wheezing.    Cardiovascular:  Negative for chest pain, palpitations and leg swelling.   Gastrointestinal:  Negative for abdominal pain, constipation, diarrhea and nausea.   Musculoskeletal:         + pain at op site   Neurological:  Negative for " dizziness, speech difficulty, light-headedness and headaches.   Objective:     Vital Signs  Temp:  [97.4 °F (36.3 °C)-98.7 °F (37.1 °C)] 98.7 °F (37.1 °C)  Heart Rate:  [] 98  Resp:  [18] 18  BP: (101-116)/(68-81) 116/81   Body mass index is 44.08 kg/m².    Physical Exam     Appearance: Normal appearance.   HENT:      Head: Normocephalic and atraumatic.      Mouth/Throat:      Mouth: Mucous membranes are moist.      Pharynx: Oropharynx is clear.   Eyes:      Extraocular Movements: Extraocular movements intact.      Conjunctiva/sclera: Conjunctivae normal.      Pupils: Pupils are equal, round, and reactive to light.   Cardiovascular:      Rate and Rhythm: Normal rate and regular rhythm.      Pulses: Normal pulses.      Heart sounds: Normal heart sounds.   Pulmonary:      Effort: Pulmonary effort is normal.      Breath sounds: No wheezing, rhonchi or rales.   Abdominal:      General: Abdomen is flat. Bowel sounds are normal.      Palpations: Abdomen is soft.      Tenderness: There is no abdominal tenderness. There is no guarding.   Musculoskeletal:      Cervical back: Normal range of motion and neck supple.      Right lower leg: No edema.      Left lower leg: No edema.   Neurological:      General: No focal deficit present.      Mental Status: She is alert and oriented to person, place, and time. Mental status is at baseline.        Diagnostic Data    Results from last 7 days   Lab Units 12/31/24  0122   WBC 10*3/mm3 13.22*   HEMOGLOBIN g/dL 12.6   HEMATOCRIT % 37.5   PLATELETS 10*3/mm3 313   GLUCOSE mg/dL 189*   CREATININE mg/dL 0.58   BUN mg/dL 6   SODIUM mmol/L 140   POTASSIUM mmol/L 4.2   ANION GAP mmol/L 12.3       No radiology results for the last day      I reviewed the patient's new clinical results.    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      Intradural extramedullary spinal tumor  - s/p resection 12/30/24  - cefazolin given for ppx   - pain control   Per spine-Continue to lay flat until noon today  - At noon, raise HOB to 30 degrees for 30 minutes  - Then raise HOB to 45 degrees for 30 minutes  - If patient tolerates this well with no postural headaches then okay to work with PT/OT  - If patient develops postural headaches will need to return to lying flat  - Continue incisional care  - Continue pain regimen  - Discharge plans pending HOB trial and PT/OT assessmen     Hyperlipidemia  - cont home atorvastatin, holding zetia as not on formulary     Anxiety  Depression  - Cont home sertraline, prn ativan     Seizures  - Hx absence seizures, well controlled  - cont home lamotrigine     Insomnia  - Melatonin  - cont home trazodone      GERD  - cont home protonix    VTE Prophylaxis:  Mechanical VTE prophylaxis orders are present.    Disposition Planning:   Barriers to Discharge:neck pain  Anticipated Date of Discharge: 1/1  Place of Discharge: home      Time: 30 minutes     There are no questions and answers to display.       Signature: Electronically signed by Blair Bhakta MD, 01/02/25, 14:24 EST.  [facility] Hospitalist Team

## 2025-01-02 NOTE — DISCHARGE SUMMARY
Discharge Summary    Patient: Elyssa Olivarez  : 1985    Patient Care Team:  Vimal Moseley PA-C as PCP - General (Physician Assistant)    Date of Admit: 2024    Date of Discharge:  2025    Discharge Diagnosis:  Intradural extramedullary spinal tumor    Spinal cord tumor      Procedures Performed  Procedure(s):  Lumbar 4 to sacral 1 laminectomy for resection of intradural tumor       Complications: None    Consultants:   Consults       Date and Time Order Name Status Description    2024 11:01 AM Inpatient Hospitalist Consult Completed     2024  1:33 AM Inpatient Neurosurgery Consult Completed             Condition on Discharge: stable    Discharge disposition: home    HPI: Elyssa Olivarez is a 39 y.o. female who presented with about a 6-month history of back pain and lower extremity pain and numbness who was found to have benign-appearing intradural lesion in the lower lumbar spine resulting in severe stenosis and compression of the nerves within the thecal sac. Given these findings patient was taken to the OR for L4-S1 laminectomy and resection of intradural tumor with Dr. Rodriguez on 2024.    Hospital Course: Patient admitted for above procedure. The patient was transferred to [facility] following recovery.  Patient was kept flat overnight postoperatively until noon on POD-1.  Head of bed was slowly raised and patient tolerated this well with no postural headaches.  She began mobilizing with PT and progressed slowly at first.  She remained neurologically intact throughout her hospital stay.  Patient's mobility and pain significantly improved on 2025.  Voiding freely.  Patient cleared for discharge and was discharged home on 2025.    Vitals:    25 0352   BP: 116/81   Pulse: 98   Resp: 18   Temp: 98.7 °F (37.1 °C)   SpO2:          Lab Results (last 24 hours)       ** No results found for the last 24 hours. **            XR Spine Lumbar 2 or 3 View    Result Date:  12/30/2024  This procedure was auto-finalized with no dictation required.                   Discharge Physical Exam:    General  - WD/WN female, appears their stated age, awake, cooperative, in no acute distress  HEENT  - Normocephalic, atraumatic, PERRLA, EOM intact  Respiratory  - Normal respiratory rate and effort  Abdomen  - Flat, soft, NT/ND  Musculoskeletal  - Moves all extremities well, no joint swelling/tenderness  Skin  - Surgical incision well approximated, clean and dry, no swelling, redness, or drainage  NEUROLOGIC  - A/O x3  - CN II-XII grossly intact  - Moves all extremities symmetrically and with good strength  - Sensation intact throughout      Discharge Medications  Inspect has been reviewed and narcotic consent is on file in the patient's chart.     Your medication list        START taking these medications        Instructions Last Dose Given Next Dose Due   ondansetron 4 MG tablet  Commonly known as: Zofran      Take 1 tablet by mouth Every 8 (Eight) Hours As Needed for Nausea or Vomiting.              CHANGE how you take these medications        Instructions Last Dose Given Next Dose Due   HYDROcodone-acetaminophen 5-325 MG per tablet  Commonly known as: BRENT  What changed: Another medication with the same name was changed. Make sure you understand how and when to take each.      Take 1 tablet by mouth Every 6 (Six) Hours As Needed for Moderate Pain.       HYDROcodone-acetaminophen 5-325 MG per tablet  Commonly known as: Norco  What changed: when to take this      Take 1 tablet by mouth Every 4 (Four) Hours As Needed for Severe Pain.              CONTINUE taking these medications        Instructions Last Dose Given Next Dose Due   atorvastatin 80 MG tablet  Commonly known as: LIPITOR      Take 1 tablet by mouth Daily.       clindamycin-benzoyl peroxide 1-5 % gel  Commonly known as: BENZACLIN      Apply  topically to the appropriate area as directed 2 (Two) Times a Day.       cyclobenzaprine 10  MG tablet  Commonly known as: FLEXERIL      Take 1 tablet by mouth Every Night.       ezetimibe 10 MG tablet  Commonly known as: Zetia      Take 1 tablet by mouth Daily. For cholesterol       gabapentin 100 MG capsule  Commonly known as: NEURONTIN      Take 1 capsule by mouth 3 (Three) Times a Day.       lamoTRIgine 150 MG tablet  Commonly known as: LaMICtal      Take 1 tablet by mouth 2 (Two) Times a Day for 360 days.       LORazepam 1 MG tablet  Commonly known as: ATIVAN      Take 1 tablet by mouth Every 8 (Eight) Hours As Needed for Anxiety.       meloxicam 15 MG tablet  Commonly known as: MOBIC      Take 1 tablet by mouth Daily. 1 PO Daily with food.       naloxone 4 MG/0.1ML nasal spray  Commonly known as: NARCAN      Call 911. Don't prime. [place] in 1 nostril for overdose. Repeat in 2-3 minutes in other nostril if no or minimal breathing/responsiveness.       pantoprazole 40 MG EC tablet  Commonly known as: PROTONIX      Take 1 tablet by mouth Daily.       Rollator Ultra-Light misc      Use 1 Units Daily.       sertraline 100 MG tablet  Commonly known as: ZOLOFT      Take 1 tablet by mouth Daily. with food.       traZODone 50 MG tablet  Commonly known as: DESYREL      Take 1 tablet by mouth Every Night.              STOP taking these medications      aspirin 81 MG chewable tablet                  Where to Get Your Medications        These medications were sent to [facility] [address] [place] [address]      Hours: Monday to Friday 7 AM to 7 PM Phone: [phone]   cyclobenzaprine 10 MG tablet  HYDROcodone-acetaminophen 5-325 MG per tablet  ondansetron 4 MG tablet         Discharge Diet: Regular, advance as tolerated      Activity at Discharge: No bending or twisting at the waist.   No lifting greater than 5 pounds.  No driving for 1 week.  Do not soak or submerge incision underwater for 6 weeks.      Call for: questions or concerns    Follow-up Appointments  Future Appointments    Date Time Provider Department Center   1/13/2025  9:00 AM Alesia Hodge PA-C [facility]   2/7/2025  8:30 AM Jarod Crawford MD [facility]      Follow-up Information       Vimal Moseley PA-C .    Specialties: Physician Assistant, Internal Medicine  Contact information:  [address]  [place] [address] [phone]               Alesia Hodge PA-C. Go on 1/13/2025.    Specialty: Neurosurgery  Why: 09:00am  Contact information:  [address]  [place] [address] [phone]                               I discussed the discharge instructions with patient    CARMELO Ma  01/02/25  13:05 EST            Part of this note may be an electronic transcription/translation of spoken language to printed text using the Dragon Dictation System.

## 2025-01-02 NOTE — OUTREACH NOTE
Prep Survey      Flowsheet Row Responses   [facility] patient discharged from? [facility]   Is LACE score < 7 ? 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 General Surgery   Does the patient have Home health ordered? No   Is there a DME ordered? No   Prep survey completed? Yes            NOAH ESPINOZA - Registered Nurse

## 2025-01-02 NOTE — PLAN OF CARE
Goal Outcome Evaluation:      C/o pain on incision site, medicated per MAR.skin is warm to touch,  moves extremities. Denies any numbness to left leg. Ambulated in the hallway with walker x 1 assist. Voided without difficulty. No headache, nausea or vomiting. Plan of care is ongoing.

## 2025-01-02 NOTE — CASE MANAGEMENT/SOCIAL WORK
Case Management Discharge Note      Final Note: HOME  DECLINED HOME HEATLH OR OP PT    Provided Post Acute Provider List?: N/A  N/A Provider List Comment: denies dc needs    Selected Continued Care - Discharged on 1/2/2025 Admission date: 12/30/2024 - Discharge disposition: Home or Self Care                  Selected Continued Care - Prior Encounters Includes continued care and service providers with selected services from prior encounters from 10/1/2024 to 1/2/2025      Discharged on 12/6/2024 Admission date: 12/4/2024 - Discharge disposition: Home or Self Care                    Transportation Services  Private: Car    Final Discharge Disposition Code: 01 - home or self-care

## 2025-01-02 NOTE — PLAN OF CARE
Goal Outcome Evaluation:                                     Discharging home with mother, will follow up in 2 weeks with neurosurgery

## 2025-01-02 NOTE — PROGRESS NOTES
Subjective     Chief Complaint   Patient presents with    Post-op         Previous Treatment: Lumbar 4 to sacral 1 laminectomy for resection or intradural tumor 12/30/24     HPI: Elyssa Olivarez is a 39 y.o. female who presents to clinic for her 2-week follow-up after her L4-S1 laminectomy for resection of intradural tumor.  Patient overall states that she is doing well.  She states that she continues to have some spurts of pain that radiates down her left leg.  She does not report any new symptoms in her right leg.  Patient denies any bowel  incontinence or saddle anesthesia at this time.  Patient does have a history of urinary incontinence where she knows she has to go but usually does not make it.  This has been going on for years.  Patient does not report getting any worse.        PMH:  Past Medical History:   Diagnosis Date    Allergic     Ankle pain, chronic     Autism disorder     Depression     Goiter     Hearing loss     Hip dysplasia, congenital     Hyperglycemia     Hyperlipidemia     Migraine headache     Mood disorder     MRSA infection     Obesity     Partial hearing loss     Pupil dilation     Right foot pain          Current Outpatient Medications:     atorvastatin (LIPITOR) 80 MG tablet, Take 1 tablet by mouth Daily., Disp: 90 tablet, Rfl: 1    clindamycin-benzoyl peroxide (BENZACLIN) 1-5 % gel, Apply  topically to the appropriate area as directed 2 (Two) Times a Day., Disp: 50 g, Rfl: 5    cyclobenzaprine (FLEXERIL) 10 MG tablet, Take 1 tablet by mouth Every Night., Disp: 15 tablet, Rfl: 0    ezetimibe (ZETIA) 10 MG tablet, TAKE 1 TABLET BY MOUTH DAILY FOR CHOLESTEROL, Disp: 90 tablet, Rfl: 1    gabapentin (NEURONTIN) 100 MG capsule, Take 1 capsule by mouth 3 (Three) Times a Day., Disp: 90 capsule, Rfl: 3    HYDROcodone-acetaminophen (Norco) 5-325 MG per tablet, Take 1 tablet by mouth Every 4 (Four) Hours As Needed for Severe Pain., Disp: 30 tablet, Rfl: 0    lamoTRIgine (LaMICtal) 150 MG  tablet, TAKE 1 TABLET BY MOUTH 2 TIMES A DAY, Disp: 180 tablet, Rfl: 1    LORazepam (ATIVAN) 1 MG tablet, Take 1 tablet by mouth Every 8 (Eight) Hours As Needed for Anxiety., Disp: 30 tablet, Rfl: 3    meloxicam (MOBIC) 15 MG tablet, Take 1 tablet by mouth Daily. 1 PO Daily with food., Disp: 90 tablet, Rfl: 1    Misc. Devices (Rollator Ultra-Light) misc, Use 1 Units Daily., Disp: 1 each, Rfl: 0    naloxone (NARCAN) 4 MG/0.1ML nasal spray, Call 911. Don't prime. [place] in 1 nostril for overdose. Repeat in 2-3 minutes in other nostril if no or minimal breathing/responsiveness., Disp: 2 each, Rfl: 0    nystatin (MYCOSTATIN) 710157 UNIT/GM powder, Apply  topically to the appropriate area as directed 3 (Three) Times a Day for 14 days., Disp: 60 g, Rfl: 5    ondansetron (Zofran) 4 MG tablet, Take 1 tablet by mouth Every 8 (Eight) Hours As Needed for Nausea or Vomiting., Disp: 15 tablet, Rfl: 0    pantoprazole (PROTONIX) 40 MG EC tablet, TAKE 1 TABLET BY MOUTH DAILY, Disp: 90 tablet, Rfl: 1    sertraline (ZOLOFT) 100 MG tablet, TAKE 1 TABLET BY MOUTH DAILY WITH FOOD, Disp: 90 tablet, Rfl: 1    traZODone (DESYREL) 50 MG tablet, Take 1 tablet by mouth Every Night., Disp: 90 tablet, Rfl: 3     Allergies   Allergen Reactions    [place] Concentrate [Flavoring Agent] Hives     Orange ibuprofen        Past Surgical History:   Procedure Laterality Date    ANKLE SURGERY Bilateral     GALLBLADDER SURGERY      HYSTERECTOMY      LUMBAR SPINAL TUMOR REMOVAL N/A 12/30/2024    Procedure: Lumbar 4 to sacral 1 laminectomy for resection of intradural tumor;  Surgeon: Delmar Rodriguez IV, MD;  Location: [facility] MAIN OR;  Service: Neurosurgery;  Laterality: N/A;    TYMPANOPLASTY          Family History   Problem Relation Age of Onset    Congenital heart disease Mother     Atrial fibrillation Mother     Heart disease Father     Heart disease Maternal Uncle     Heart disease Maternal Grandmother     Heart disease Paternal Grandmother          Social  "Hx:  Social History     Tobacco Use   Smoking Status Never    Passive exposure: Never   Smokeless Tobacco Never      Alcohol Use: Not At Risk (12/30/2024)    AUDIT-C     Frequency of Alcohol Consumption: Never     Average Number of Drinks: Patient does not drink     Frequency of Binge Drinking: Never      Social History     Substance and Sexual Activity   Drug Use No          Review of Systems   Constitutional:  Positive for activity change. Negative for fever.   Respiratory:  Negative for chest tightness and shortness of breath.    Cardiovascular:  Negative for chest pain.   Musculoskeletal:  Positive for back pain and myalgias.        + leg pain   Neurological:  Negative for numbness.         Objective     /84   Pulse 104   Resp 18   Ht 157.5 cm (62\")   Wt 109 kg (241 lb)   SpO2 97%   BMI 44.08 kg/m²    Body mass index is 44.08 kg/m².      Physical Exam  Vitals reviewed.   Eyes:      Extraocular Movements: Extraocular movements intact.      Conjunctiva/sclera: Conjunctivae normal.      Pupils: Pupils are equal, round, and reactive to light.   Musculoskeletal:         General: Normal range of motion.      Cervical back: Normal range of motion.   Skin:     General: Skin is warm and dry.   Neurological:      General: No focal deficit present.   Psychiatric:         Mood and Affect: Mood normal.         Speech: Speech normal.          Neurological Exam  Mental Status  Awake, alert and oriented to person, place and time. Oriented to person, place and time. Speech is normal. Language is fluent with no aphasia.    Cranial Nerves  CN III, IV, VI: Extraocular movements intact bilaterally. Pupils equal round and reactive to light bilaterally.    Motor  Normal muscle bulk throughout.                                               Right                     Left   Iliopsoas                               5                          5   Quadriceps                           5                          5   Hamstring       "                       5                          5   Gastrocnemius                     5                           5   Anterior tibialis                      5                          5   Posterior tibialis                    5                          5    Sensory  Sensation is intact to light touch, pinprick, vibration and proprioception in all four extremities.    Gait    Patient in a wheelchair during this visit.          Results Review  I personally reviewed and interpreted the images from the following studies:          XR Spine Lumbar 2 or 3 View    Result Date: 12/30/2024  This procedure was auto-finalized with no dictation required.       Assessment & Plan     MDM: Elyssa Olivarez is a 39 y.o. female who presents to clinic for her 2-week postoperative appointment.  Patient overall is doing very well.  She states that she continues to have some random spurts of pain that shoots down her left leg, but otherwise her symptoms have drastically improved.  She continues to report no symptoms in her right leg.    At this time patient is to start physical therapy and should continue to be cognizant of bending, lifting and twisting.  I told the patient that I do not want her lifting more than 10 pounds.  I also let her know that she can go ahead and get her incision wet in the shower but should not submerge her incision such as in baths, hot tubs, etc.    Patient continues to have good strength in her bilateral lower extremities and full strength.    Patient's incision looks very good and she does not report any fever or chills.  Patient also does not report any drainage from the incision site.  Incision is fully intact, not erythematous and no drainage present.  She did ask for more pain medication and Flexeril.  Patient does not drive.     There is no imaging required at this time.  Patient is to follow-up for her 3-month checkup with .  Should patient start to experience worsening symptoms or new  symptoms she is to call the office with any questions or concerns.    Patient and her family are agreeable to this plan and know to call with any questions or concerns.       Diagnosis Plan   1. 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              This patient was examined wearing appropriate personal protective equipment.            Alesia Hodge PA-C    01/13/25  09:33 EST      Part of this note may be an electronic transcription/translation of spoken language to printed text using the Dragon Dictation System.

## 2025-01-03 ENCOUNTER — TRANSITIONAL CARE MANAGEMENT TELEPHONE ENCOUNTER (OUTPATIENT)
Dept: CALL CENTER | Facility: HOSPITAL | Age: 40
End: 2025-01-03
Payer: MEDICARE

## 2025-01-03 DIAGNOSIS — M54.32 BILATERAL SCIATICA: ICD-10-CM

## 2025-01-03 DIAGNOSIS — F41.1 GENERALIZED ANXIETY DISORDER: ICD-10-CM

## 2025-01-03 DIAGNOSIS — M54.42 ACUTE LEFT-SIDED LOW BACK PAIN WITH LEFT-SIDED SCIATICA: ICD-10-CM

## 2025-01-03 DIAGNOSIS — D49.7 INTRADURAL EXTRAMEDULLARY SPINAL TUMOR: ICD-10-CM

## 2025-01-03 DIAGNOSIS — M54.31 BILATERAL SCIATICA: ICD-10-CM

## 2025-01-03 DIAGNOSIS — M54.10 RADICULAR LEG PAIN: ICD-10-CM

## 2025-01-03 NOTE — OUTREACH NOTE
Call Center TCM Note      Flowsheet Row Responses   [facility] patient discharged from? [facility]   Does the patient have one of the following disease processes/diagnoses(primary or secondary)? General Surgery   TCM attempt successful? Yes   Call start time 1436   Call end time 1442   Discharge diagnosis Intradural extramedullary spinal tumor    Spinal cord tumor, Lumbar 4 to sacral 1 laminectomy for resection of intradural tumor   Is patient permission given to speak with other caregiver? Yes   Person spoke with today (if not patient) and relationship MotherAdelaide reviewed with patient/caregiver? Yes   Does the patient have all medications related to this admission filled (includes all antibiotics, pain medications, etc.) Yes   Is the patient taking all medications as directed (includes completed medication regime)? Yes   Comments PCP Vimal RHODES. Hospital follow up appt scheduled for 1/16  11am with PCP   Does the patient have an appointment with their PCP within 7-14 days of discharge? No   Nursing Interventions Assisted patient with making appointment per protocol, Routed TCM call to PCP office   Has home health visited the patient within 72 hours of discharge? N/A  [Mother is paid caregiver thru [facility]]   Psychosocial issues? No   Did the patient receive a copy of their discharge instructions? Yes   Nursing interventions Reviewed instructions with patient  [mother]   What is the patient's perception of their health status since discharge? Improving   Is the patient /caregiver able to teach back basic post-op care? Keep incision areas clean,dry and protected, Take showers only when approved by MD-sponge bathjenny until then   Is the patient/caregiver able to teach back signs and symptoms of incisional infection? Increased redness, swelling or pain at the incisonal site, Increased drainage or bleeding, Pus or odor from incision, Fever   Is the patient/caregiver able to teach back steps to  recovery at home? Set small, achievable goals for return to baseline health, Rest and rebuild strength, gradually increase activity, Eat a well-balance diet   If the patient is a current smoker, are they able to teach back resources for cessation? Not a smoker   Is the patient/caregiver able to teach back the hierarchy of who to call/visit for symptoms/problems? PCP, Specialist, Home health nurse, Urgent Care, ED, 911 Yes   TCM call completed? Yes   Wrap up additional comments Post op appt 1/13  9am   Call end time 1442   Would this patient benefit from a Referral to Amb Social Work? No   Is the patient interested in additional calls from an ambulatory ? No            Haylee Hardwick, RN    1/3/2025, 14:45 EST

## 2025-01-06 RX ORDER — PANTOPRAZOLE SODIUM 40 MG/1
40 TABLET, DELAYED RELEASE ORAL DAILY
Qty: 90 TABLET | Refills: 1 | Status: SHIPPED | OUTPATIENT
Start: 2025-01-06 | End: 2025-01-06 | Stop reason: SDUPTHER

## 2025-01-06 RX ORDER — LAMOTRIGINE 150 MG/1
150 TABLET ORAL 2 TIMES DAILY
Qty: 180 TABLET | Refills: 1 | Status: SHIPPED | OUTPATIENT
Start: 2025-01-06 | End: 2025-01-06 | Stop reason: SDUPTHER

## 2025-01-06 RX ORDER — PANTOPRAZOLE SODIUM 40 MG/1
40 TABLET, DELAYED RELEASE ORAL DAILY
Qty: 90 TABLET | Refills: 1 | Status: SHIPPED | OUTPATIENT
Start: 2025-01-06

## 2025-01-06 RX ORDER — NYSTATIN 100000 [USP'U]/G
POWDER TOPICAL 3 TIMES DAILY
Qty: 60 G | Refills: 5 | Status: SHIPPED | OUTPATIENT
Start: 2025-01-06 | End: 2025-01-20

## 2025-01-06 RX ORDER — ATORVASTATIN CALCIUM 80 MG/1
80 TABLET, FILM COATED ORAL DAILY
Qty: 90 TABLET | Refills: 1 | Status: SHIPPED | OUTPATIENT
Start: 2025-01-06

## 2025-01-06 RX ORDER — LORAZEPAM 1 MG/1
1 TABLET ORAL EVERY 8 HOURS PRN
Qty: 30 TABLET | Refills: 3 | Status: SHIPPED | OUTPATIENT
Start: 2025-01-06

## 2025-01-06 RX ORDER — EZETIMIBE 10 MG/1
10 TABLET ORAL DAILY
Qty: 90 TABLET | Refills: 1 | Status: SHIPPED | OUTPATIENT
Start: 2025-01-06

## 2025-01-06 RX ORDER — HYDROCODONE BITARTRATE AND ACETAMINOPHEN 5; 325 MG/1; MG/1
1 TABLET ORAL EVERY 6 HOURS PRN
Qty: 21 TABLET | Refills: 0 | Status: SHIPPED | OUTPATIENT
Start: 2025-01-06 | End: 2025-01-13

## 2025-01-06 RX ORDER — CLINDAMYCIN AND BENZOYL PEROXIDE 10; 50 MG/G; MG/G
GEL TOPICAL 2 TIMES DAILY
Qty: 50 G | Refills: 5 | Status: SHIPPED | OUTPATIENT
Start: 2025-01-06

## 2025-01-06 RX ORDER — LAMOTRIGINE 150 MG/1
150 TABLET ORAL 2 TIMES DAILY
Qty: 180 TABLET | Refills: 1 | Status: SHIPPED | OUTPATIENT
Start: 2025-01-06

## 2025-01-06 RX ORDER — SERTRALINE HYDROCHLORIDE 100 MG/1
100 TABLET, FILM COATED ORAL DAILY
Qty: 90 TABLET | Refills: 1 | Status: SHIPPED | OUTPATIENT
Start: 2025-01-06 | End: 2025-01-06 | Stop reason: SDUPTHER

## 2025-01-06 RX ORDER — TRAZODONE HYDROCHLORIDE 50 MG/1
50 TABLET, FILM COATED ORAL NIGHTLY
Qty: 90 TABLET | Refills: 3 | Status: SHIPPED | OUTPATIENT
Start: 2025-01-06

## 2025-01-06 RX ORDER — SERTRALINE HYDROCHLORIDE 100 MG/1
100 TABLET, FILM COATED ORAL DAILY
Qty: 90 TABLET | Refills: 1 | Status: SHIPPED | OUTPATIENT
Start: 2025-01-06

## 2025-01-06 RX ORDER — EZETIMIBE 10 MG/1
10 TABLET ORAL DAILY
Qty: 90 TABLET | Refills: 1 | Status: SHIPPED | OUTPATIENT
Start: 2025-01-06 | End: 2025-01-06 | Stop reason: SDUPTHER

## 2025-01-07 RX ORDER — CYCLOBENZAPRINE HCL 10 MG
10 TABLET ORAL NIGHTLY
Qty: 15 TABLET | Refills: 0 | Status: SHIPPED | OUTPATIENT
Start: 2025-01-07 | End: 2025-01-13 | Stop reason: SDUPTHER

## 2025-01-07 RX ORDER — GABAPENTIN 100 MG/1
100 CAPSULE ORAL 3 TIMES DAILY
Qty: 90 CAPSULE | Refills: 3 | Status: SHIPPED | OUTPATIENT
Start: 2025-01-07

## 2025-01-07 RX ORDER — MELOXICAM 15 MG/1
15 TABLET ORAL DAILY
Qty: 90 TABLET | Refills: 1 | Status: SHIPPED | OUTPATIENT
Start: 2025-01-07

## 2025-01-07 RX ORDER — ONDANSETRON 4 MG/1
4 TABLET, FILM COATED ORAL EVERY 8 HOURS PRN
Qty: 15 TABLET | Refills: 0 | Status: SHIPPED | OUTPATIENT
Start: 2025-01-07

## 2025-01-07 NOTE — TELEPHONE ENCOUNTER
Rx Refill Note  Requested Prescriptions     Pending Prescriptions Disp Refills    HYDROcodone-acetaminophen (Norco) 5-325 MG per tablet 30 tablet 0     Sig: Take 1 tablet by mouth Every 4 (Four) Hours As Needed for Severe Pain.     Signed Prescriptions Disp Refills    cyclobenzaprine (FLEXERIL) 10 MG tablet 15 tablet 0     Sig: Take 1 tablet by mouth Every Night.     Authorizing Provider: BERNARDINO VILLALBA     Ordering User: WILLIAM MERINO    ondansetron (Zofran) 4 MG tablet 15 tablet 0     Sig: Take 1 tablet by mouth Every 8 (Eight) Hours As Needed for Nausea or Vomiting.     Authorizing Provider: BERNARDINO VILLALBA     Ordering User: WILLIAM MERINO      Last office visit with prescribing clinician: Visit date not found   Last telemedicine visit with prescribing clinician: Visit date not found   Next office visit with prescribing clinician: Visit date not found                         Would you like a call back once the refill request has been completed: [] Yes [] No    If the office needs to give you a call back, can they leave a voicemail: [] Yes [] No    William Merino MA  01/07/25, 13:25 [facility] Refill Note  Requested Prescriptions     Pending Prescriptions Disp Refills    HYDROcodone-acetaminophen (Norco) 5-325 MG per tablet 30 tablet 0     Sig: Take 1 tablet by mouth Every 4 (Four) Hours As Needed for Severe Pain.     Signed Prescriptions Disp Refills    cyclobenzaprine (FLEXERIL) 10 MG tablet 15 tablet 0     Sig: Take 1 tablet by mouth Every Night.     Authorizing Provider: BERNARDINO VILLALBA     Ordering User: WILLIAM MERINO    ondansetron (Zofran) 4 MG tablet 15 tablet 0     Sig: Take 1 tablet by mouth Every 8 (Eight) Hours As Needed for Nausea or Vomiting.     Authorizing Provider: BERNARDINO VILLALBA     Ordering User: WILLIAM MERINO      Last office visit with prescribing clinician: Visit date not found   Last telemedicine visit with prescribing clinician: Visit date not found   Next office visit with  prescribing clinician: Visit date not found                         Would you like a call back once the refill request has been completed: [] Yes [] No    If the office needs to give you a call back, can they leave a voicemail: [] Yes [] No    Galindo Merino MA  01/07/25, 13:25 EST

## 2025-01-08 RX ORDER — HYDROCODONE BITARTRATE AND ACETAMINOPHEN 5; 325 MG/1; MG/1
1 TABLET ORAL EVERY 4 HOURS PRN
Qty: 30 TABLET | Refills: 0 | OUTPATIENT
Start: 2025-01-08

## 2025-01-08 NOTE — TELEPHONE ENCOUNTER
Called to let patient know that since she got her PCP to fill the medication that [facility] is not gonna fill them. She stated tae understood.

## 2025-01-13 ENCOUNTER — OFFICE VISIT (OUTPATIENT)
Dept: NEUROSURGERY | Facility: CLINIC | Age: 40
End: 2025-01-13
Payer: MEDICARE

## 2025-01-13 ENCOUNTER — READMISSION MANAGEMENT (OUTPATIENT)
Dept: CALL CENTER | Facility: HOSPITAL | Age: 40
End: 2025-01-13
Payer: MEDICARE

## 2025-01-13 VITALS
HEIGHT: 62 IN | OXYGEN SATURATION: 97 % | RESPIRATION RATE: 18 BRPM | HEART RATE: 104 BPM | BODY MASS INDEX: 44.35 KG/M2 | WEIGHT: 241 LBS | SYSTOLIC BLOOD PRESSURE: 134 MMHG | DIASTOLIC BLOOD PRESSURE: 84 MMHG

## 2025-01-13 DIAGNOSIS — D49.7 INTRADURAL EXTRAMEDULLARY SPINAL TUMOR: Primary | ICD-10-CM

## 2025-01-13 DIAGNOSIS — Z86.018 HISTORY OF BENIGN SPINAL CORD TUMOR: ICD-10-CM

## 2025-01-13 PROCEDURE — 99024 POSTOP FOLLOW-UP VISIT: CPT

## 2025-01-13 PROCEDURE — 1160F RVW MEDS BY RX/DR IN RCRD: CPT

## 2025-01-13 PROCEDURE — 1159F MED LIST DOCD IN RCRD: CPT

## 2025-01-13 RX ORDER — HYDROCODONE BITARTRATE AND ACETAMINOPHEN 5; 325 MG/1; MG/1
1 TABLET ORAL EVERY 4 HOURS PRN
Qty: 30 TABLET | Refills: 0 | Status: SHIPPED | OUTPATIENT
Start: 2025-01-13

## 2025-01-13 RX ORDER — CYCLOBENZAPRINE HCL 10 MG
10 TABLET ORAL NIGHTLY
Qty: 15 TABLET | Refills: 0 | Status: SHIPPED | OUTPATIENT
Start: 2025-01-13

## 2025-01-13 NOTE — OUTREACH NOTE
General Surgery Week 2 Survey      Flowsheet Row Responses   [facility] patient discharged from? [facility]   Does the patient have one of the following disease processes/diagnoses(primary or secondary)? General Surgery   Week 2 attempt successful? Yes   Call start time 1436   Call end time 1437   Discharge diagnosis Intradural extramedullary spinal tumor    Spinal cord tumor, Lumbar 4 to sacral 1 laminectomy for resection of intradural tumor   Is patient permission given to speak with other caregiver? Yes   List who call center can speak with Mother Adelaide   Person spoke with today (if not patient) and relationship Mother Adelaide   Meds reviewed with patient/caregiver? Yes   Is the patient taking all medications as directed (includes completed medication regime)? Yes   Does the patient have a follow up appointment scheduled with their surgeon? Yes   Has the patient kept scheduled appointments due by today? Yes  [FU apt 1/13/25]   Comments PCP Vimal RHODES. Hospital follow up appt scheduled for 1/16  11am with PCP   Home health comments Pt to attend outpt P.T.   What is the patient's perception of their health status since discharge? Improving   Week 2 call completed? Yes   Graduated Yes   Graduated/Revoked comments Pt was seen today (1/13/25) for the FU visit,  no issues during call   Call end time 1437            Lennie STONER - Registered Nurse

## 2025-01-16 ENCOUNTER — OFFICE VISIT (OUTPATIENT)
Dept: FAMILY MEDICINE CLINIC | Facility: CLINIC | Age: 40
End: 2025-01-16
Payer: MEDICARE

## 2025-01-16 VITALS
OXYGEN SATURATION: 97 % | RESPIRATION RATE: 16 BRPM | BODY MASS INDEX: 44.2 KG/M2 | SYSTOLIC BLOOD PRESSURE: 138 MMHG | WEIGHT: 240.2 LBS | HEART RATE: 105 BPM | HEIGHT: 62 IN | DIASTOLIC BLOOD PRESSURE: 82 MMHG

## 2025-01-16 DIAGNOSIS — G95.89 MASS OF SPINAL CORD: Primary | ICD-10-CM

## 2025-01-16 DIAGNOSIS — M54.6 CHRONIC MIDLINE THORACIC BACK PAIN: ICD-10-CM

## 2025-01-16 DIAGNOSIS — G89.29 CHRONIC MIDLINE THORACIC BACK PAIN: ICD-10-CM

## 2025-01-16 PROCEDURE — 1160F RVW MEDS BY RX/DR IN RCRD: CPT | Performed by: PHYSICIAN ASSISTANT

## 2025-01-16 PROCEDURE — 1125F AMNT PAIN NOTED PAIN PRSNT: CPT | Performed by: PHYSICIAN ASSISTANT

## 2025-01-16 PROCEDURE — 1159F MED LIST DOCD IN RCRD: CPT | Performed by: PHYSICIAN ASSISTANT

## 2025-01-16 PROCEDURE — 99213 OFFICE O/P EST LOW 20 MIN: CPT | Performed by: PHYSICIAN ASSISTANT

## 2025-01-16 RX ORDER — GABAPENTIN 300 MG/1
300 CAPSULE ORAL NIGHTLY
Qty: 30 CAPSULE | Refills: 1 | Status: SHIPPED | OUTPATIENT
Start: 2025-01-16

## 2025-01-23 ENCOUNTER — TREATMENT (OUTPATIENT)
Dept: PHYSICAL THERAPY | Facility: CLINIC | Age: 40
End: 2025-01-23
Payer: MEDICARE

## 2025-01-23 DIAGNOSIS — D49.7 INTRADURAL EXTRAMEDULLARY SPINAL TUMOR: ICD-10-CM

## 2025-01-23 DIAGNOSIS — M54.50 LOW BACK PAIN, UNSPECIFIED BACK PAIN LATERALITY, UNSPECIFIED CHRONICITY, UNSPECIFIED WHETHER SCIATICA PRESENT: Primary | ICD-10-CM

## 2025-01-23 DIAGNOSIS — Z86.018 HISTORY OF BENIGN SPINAL CORD TUMOR: ICD-10-CM

## 2025-01-23 NOTE — PROGRESS NOTES
"Physical Therapy Initial Evaluation and Plan of Care  [address] [facility] [place] [address]     Patient: Elyssa Olivarez   : 1985  Diagnosis/ICD-10 Code:  Low back pain, unspecified back pain laterality, unspecified chronicity, unspecified whether sciatica present [M54.50]  Referring practitioner: Alesia Hodge PA-C  Date of Initial Visit: 2025  Today's Date: 2025  Patient seen for 1 session         Visit Diagnoses:    ICD-10-CM ICD-9-CM   1. Low back pain, unspecified back pain laterality, unspecified chronicity, unspecified whether sciatica present  M54.50 724.2   2. Intradural extramedullary spinal tumor  D49.7 239.7   3. History of benign spinal cord tumor  Z86.018 V12.49         Subjective Questionnaire: Oswestry: 62%      Subjective 40 yo female with c/o LBP and dx intraduarl extramedullary spinal tumor with subsequent L4-S1 laminectomy and resection of the intradural tumor 24. Pt with ~6 month history of L LE global pain and numbness/tingling. Pt feels her L LE symptoms have improved significantly post op. Still having pain to mid thigh at times. She does have some \"burning\" pain along her belt line area. 4/10 pain now and 9/10 at worst. Using a rolling wx prn. Pt can walk 20-30 ft at a time before needing to stop. She does have a 10 lb lifting restriction per pt's mother. Pt with dx autism and she has a brain tumor per her mother as well. She is seeing the neurosurgeon 25 to discuss brain surgery. History of bilateral ankle fusion. Pt notes she does not like therapy.  Social hx: Disabled, lives with her mother  MD/PA follow up: 3/3/25        Objective          Neurological Testing     Sensation     Lumbar   Left   Diminished: light touch    Active Range of Motion     Additional Active Range of Motion Details  Significantly limited with flexion and bilateral SB, limited by c/o pain    Strength/Myotome Testing     Left Hip   Planes of Motion   Flexion: 3    Right " Hip   Planes of Motion   Flexion: 3    Left Knee   Flexion: 3-  Extension: 3+    Right Knee   Flexion: 3-  Extension: 3+      Posture is guarded.  Wide SHELLEY with ambulation, toe out.  Further testing deferred due to c/o pain    Assessment & Plan       Assessment  Impairments: abnormal coordination, abnormal gait, abnormal muscle tone, abnormal or restricted ROM, activity intolerance, impaired physical strength, lacks appropriate home exercise program and pain with function   Functional limitations: carrying objects, lifting, sleeping, walking, pulling, pushing, moving in bed, sitting, standing, stooping and unable to perform repetitive tasks   Assessment details: 40 yo female with c/o LBP and dx intraduarl extramedullary spinal tumor with subsequent L4-S1 laminectomy and resection of the intradural tumor 12/30/24. Pt is with limited tolerance toward evaluation and low level exercise due to c/o pain.   Prognosis: good    Goals  Plan Goals: Short term goals, 1 week: Tolerate HEP progression.  Voice compliance with activity modification.  Report improvement in symptoms.    Long term goals, 5 weeks: Improve CHRISTINA score to 26%.  AROM to be wfl.  Pt to tolerate 12 min or more of standing and/or walking before needing to rest.  4/5 strength or better throughout.  Symptoms to be centralized.  Independent with HEP.    Plan  Therapy options: will be seen for skilled therapy services  Other planned modality interventions: modalities prn  Planned therapy interventions: abdominal trunk stabilization, body mechanics training, flexibility, functional ROM exercises, home exercise program, manual therapy, neuromuscular re-education, postural training, strengthening, stretching and therapeutic activities  Frequency: 2x week  Duration in weeks: 5  Treatment plan discussed with: patient and family (Pt's mother present for evaluation)        History # of Personal Factors and/or Comorbidities: HIGH (3+)  Examination of Body System(s): # of  elements: MODERATE (3)  Clinical Presentation: EVOLVING  Clinical Decision Making: MODERATE      Timed:         Manual Therapy:         mins  00054;     Therapeutic Exercise:    15     mins  03319;     Neuromuscular Dmitry:        mins  97085;    Therapeutic Activity:     8     mins  49576;     Gait Training:           mins  65179;     Ultrasound:          mins  85137;    Ionto                                   mins   13225  Self Care                            mins   35447      Un-Timed:  Electrical Stimulation:         mins  98573 ( );  Dry Needling          mins self-pay  Traction          mins 64658  Low Eval          Mins 60798  Mod Eval     20     Mins 47260  High Eval                            Mins 82723  Re-Eval          Mins 64999  Canalith Repos         mins 26608      Timed Treatment:   23   mins   Total Treatment:     43   mins          PT: Adrián Atwood, PT     [place] license: [id number]  Electronically signed by Adrián Atwood PT, 01/23/25, 3:29 PM EST    Certification Period: 1/23/2025 thru 4/22/2025  I certify that the therapy services are furnished while this patient is under my care.  The services outlined above are required by this patient, and will be reviewed every 90 days.         Physician Signature:__________________________________________________    PHYSICIAN: Alesia Hodge PA-C  NPI: [id number]                                      DATE:      Please sign and return via fax to [email or web address] . Thank you, [facility].   Abbe Flap (Lower To Upper Lip) Text: The defect of the upper lip was assessed and measured.  Given the location and size of the defect, an Abbe flap was deemed most appropriate. Using a sterile surgical marker, an appropriate Abbe flap was measured and drawn on the lower lip. Local anesthesia was then infiltrated. A scalpel was then used to incise the upper lip through and through the skin, vermilion, muscle and mucosa, leaving the flap pedicled on the opposite side.  The flap was then rotated and transferred to the lower lip defect.  The flap was then sutured into place with a three layer technique, closing the orbicularis oris muscle layer with subcutaneous buried sutures, followed by a mucosal layer and an epidermal layer.

## 2025-01-28 ENCOUNTER — TREATMENT (OUTPATIENT)
Dept: PHYSICAL THERAPY | Facility: CLINIC | Age: 40
End: 2025-01-28
Payer: MEDICARE

## 2025-01-28 DIAGNOSIS — M54.50 LOW BACK PAIN, UNSPECIFIED BACK PAIN LATERALITY, UNSPECIFIED CHRONICITY, UNSPECIFIED WHETHER SCIATICA PRESENT: Primary | ICD-10-CM

## 2025-01-28 DIAGNOSIS — D49.7 INTRADURAL EXTRAMEDULLARY SPINAL TUMOR: ICD-10-CM

## 2025-01-28 DIAGNOSIS — Z86.018 HISTORY OF BENIGN SPINAL CORD TUMOR: ICD-10-CM

## 2025-01-28 DIAGNOSIS — F41.1 GENERALIZED ANXIETY DISORDER: ICD-10-CM

## 2025-01-28 RX ORDER — ONDANSETRON 4 MG/1
4 TABLET, FILM COATED ORAL EVERY 8 HOURS PRN
Qty: 15 TABLET | Refills: 0 | Status: SHIPPED | OUTPATIENT
Start: 2025-01-28

## 2025-01-28 RX ORDER — HYDROCODONE BITARTRATE AND ACETAMINOPHEN 5; 325 MG/1; MG/1
1 TABLET ORAL EVERY 4 HOURS PRN
Qty: 30 TABLET | Refills: 0 | Status: SHIPPED | OUTPATIENT
Start: 2025-01-28

## 2025-01-28 NOTE — PROGRESS NOTES
Physical Therapy Daily Treatment Note      Patient: Elyssa Olivarez   : 1985  Referring practitioner: Alesia Hodge PA-C  Date of Initial Visit: Type: THERAPY  Noted: 2025  Today's Date: 2025  Patient seen for 2 sessions       Visit Diagnoses:    ICD-10-CM ICD-9-CM   1. Low back pain, unspecified back pain laterality, unspecified chronicity, unspecified whether sciatica present  M54.50 724.2   2. Intradural extramedullary spinal tumor  D49.7 239.7   3. History of benign spinal cord tumor  Z86.018 V12.49       Subjective No new complaints. Pt feels her HEP is going well.    Objective   See Exercise, Manual, and Modality Logs for complete treatment.       Assessment/Plan Needs significant encouragement to participate in treatment today. Tolerated visit well.      Timed:         Manual Therapy:         mins  93649;     Therapeutic Exercise:    25     mins  79807;     Neuromuscular Dmitry:    15    mins  78719;    Therapeutic Activity:          mins  65806;     Gait Training:           mins  29189;     Ultrasound:          mins  05338;    Ionto                                   mins   62009  Self Care                            mins   68400      Un-Timed:  Electrical Stimulation:         mins  65928 ( );  Dry Needling          mins self-pay  Traction          mins 99772  Canalith Repos         mins 88468    Timed Treatment:   40   mins   Total Treatment:     40   mins      Adrián Atwood PT, PT, DPT, OCS  [place] license: [id number]

## 2025-01-28 NOTE — TELEPHONE ENCOUNTER
Rx Refill Note  Requested Prescriptions     Pending Prescriptions Disp Refills    HYDROcodone-acetaminophen (Norco) 5-325 MG per tablet 30 tablet 0     Sig: Take 1 tablet by mouth Every 4 (Four) Hours As Needed for Severe Pain.      Last office visit with prescribing clinician: 1/13/2025   Last telemedicine visit with prescribing clinician: Visit date not found   Next office visit with prescribing clinician: Visit date not found                         Would you like a call back once the refill request has been completed: [] Yes [] No    If the office needs to give you a call back, can they leave a voicemail: [] Yes [] No    Galindo Merino MA  01/28/25, 13:37 EST

## 2025-01-30 ENCOUNTER — TREATMENT (OUTPATIENT)
Dept: PHYSICAL THERAPY | Facility: CLINIC | Age: 40
End: 2025-01-30
Payer: MEDICARE

## 2025-01-30 DIAGNOSIS — Z86.018 HISTORY OF BENIGN SPINAL CORD TUMOR: ICD-10-CM

## 2025-01-30 DIAGNOSIS — D49.7 INTRADURAL EXTRAMEDULLARY SPINAL TUMOR: ICD-10-CM

## 2025-01-30 DIAGNOSIS — M54.50 LOW BACK PAIN, UNSPECIFIED BACK PAIN LATERALITY, UNSPECIFIED CHRONICITY, UNSPECIFIED WHETHER SCIATICA PRESENT: Primary | ICD-10-CM

## 2025-01-30 RX ORDER — LORAZEPAM 1 MG/1
1 TABLET ORAL EVERY 8 HOURS PRN
Qty: 30 TABLET | Refills: 3 | Status: SHIPPED | OUTPATIENT
Start: 2025-01-30

## 2025-01-31 NOTE — PROGRESS NOTES
Physical Therapy Daily Treatment Note      Patient: Elyssa Olivarez   : 1985  Referring practitioner: Alesia Hodge PA-C  Date of Initial Visit: Type: THERAPY  Noted: 2025  Today's Date: 2025  Patient seen for 3 sessions       Visit Diagnoses:    ICD-10-CM ICD-9-CM   1. Low back pain, unspecified back pain laterality, unspecified chronicity, unspecified whether sciatica present  M54.50 724.2   2. Intradural extramedullary spinal tumor  D49.7 239.7   3. History of benign spinal cord tumor  Z86.018 V12.49       Subjective Pt feels her LBP is improving.    Objective   See Exercise, Manual, and Modality Logs for complete treatment.       Assessment/Plan Fatigued post visit. Pt needs significant encouragement to participate in therapy.      Timed:         Manual Therapy:         mins  79774;     Therapeutic Exercise:    30     mins  08578;     Neuromuscular Dmitry:    10    mins  14883;    Therapeutic Activity:          mins  87642;     Gait Training:           mins  84700;     Ultrasound:          mins  87694;    Ionto                                   mins   52234  Self Care                            mins   14513      Un-Timed:  Electrical Stimulation:         mins  21130 ( );  Dry Needling          mins self-pay  Traction          mins 31452  Canalith Repos         mins 56996    Timed Treatment:   40   mins   Total Treatment:     40   mins      Adrián Atwood PT, PT, DPT, OCS  [place] license: [id number]

## 2025-02-04 ENCOUNTER — TREATMENT (OUTPATIENT)
Dept: PHYSICAL THERAPY | Facility: CLINIC | Age: 40
End: 2025-02-04
Payer: MEDICARE

## 2025-02-04 DIAGNOSIS — Z86.018 HISTORY OF BENIGN SPINAL CORD TUMOR: ICD-10-CM

## 2025-02-04 DIAGNOSIS — D49.7 INTRADURAL EXTRAMEDULLARY SPINAL TUMOR: ICD-10-CM

## 2025-02-04 DIAGNOSIS — M54.50 LOW BACK PAIN, UNSPECIFIED BACK PAIN LATERALITY, UNSPECIFIED CHRONICITY, UNSPECIFIED WHETHER SCIATICA PRESENT: Primary | ICD-10-CM

## 2025-02-04 NOTE — PROGRESS NOTES
"Subjective   Elyssa Olivarez is a 39 y.o. female.     Chief Complaint   Patient presents with    Hospital Follow Up Visit     Spinal tumor removal 12/30/24       /82 (BP Location: Left arm, Patient Position: Sitting, Cuff Size: Large Adult)   Pulse 105   Resp 16   Ht 157.5 cm (62\")   Wt 109 kg (240 lb 3.2 oz)   SpO2 97%   BMI 43.93 kg/m²     BP Readings from Last 3 Encounters:   01/16/25 138/82   01/13/25 134/84   01/02/25 116/81       Wt Readings from Last 3 Encounters:   01/16/25 109 kg (240 lb 3.2 oz)   01/13/25 109 kg (241 lb)   12/30/24 109 kg (241 lb)       HPI presents to the clinic for follow-up from recent spinal cord tumor resection.  She was having progressive back discomfort and weakness.  We had ordered MRIs and she was found to have a benign tumor that was resected.  She has had significant relief of her pain since then.  She still does have some chronic pain after the surgery and knows that this is possible but states that she is feeling a lot better afterwards.  She denies having any bowel or bladder incontinence or retention.  She is following up with the neurosurgeon on 2/7/2025.  She saw neurosurgery PA on 1/13/2025 and at that time everything was thought to be going very well.    The following portions of the patient's history were reviewed and updated as appropriate: allergies, current medications, past family history, past medical history, past social history, past surgical history, and problem list.    Review of Systems    Objective   Physical Exam  Constitutional:       Appearance: She is well-developed.   HENT:      Head: Normocephalic and atraumatic.   Eyes:      Conjunctiva/sclera: Conjunctivae normal.      Pupils: Pupils are equal, round, and reactive to light.   Cardiovascular:      Rate and Rhythm: Normal rate and regular rhythm.      Heart sounds: No murmur heard.  Pulmonary:      Effort: Pulmonary effort is normal.      Breath sounds: Normal breath sounds.   Abdominal: "      Palpations: Abdomen is soft.   Musculoskeletal:         General: No deformity. Normal range of motion.      Cervical back: Normal range of motion and neck supple.   Lymphadenopathy:      Cervical: No cervical adenopathy.   Skin:     General: Skin is warm and dry.      Capillary Refill: Capillary refill takes less than 2 seconds.      Findings: No rash.   Neurological:      Mental Status: She is alert and oriented to person, place, and time.      Cranial Nerves: No cranial nerve deficit.      Coordination: Coordination normal.      Gait: Gait normal.   Psychiatric:         Behavior: Behavior normal.         Thought Content: Thought content normal.         Judgment: Judgment normal.           Diagnoses and all orders for this visit:    1. Mass of spinal cord (Primary)    2. Chronic midline thoracic back pain    At this point everything seems to be going quite well.  She will continue her current medications and she will continue follow-up with the neurosurgeon.  If anything new progresses or begins to occur they are to let me know.  I told him to watch out for lower extremity redness, pain, edema.  They are to monitor for any shortness of breath, cough, fevers.  They are to let me or neurosurgery know if she is to have any new lower extremity weakness, back pain, or bowel or bladder incontinence or retention.    Return in about 4 months (around 5/16/2025), or if symptoms worsen or fail to improve, for Recheck.

## 2025-02-04 NOTE — PROGRESS NOTES
Physical Therapy Daily Treatment Note      Patient: Elyssa Olivarez   : 1985  Referring practitioner: Alesia Hodge PA-C  Date of Initial Visit: Type: THERAPY  Noted: 2025  Today's Date: 2025  Patient seen for 4 sessions       Visit Diagnoses:    ICD-10-CM ICD-9-CM   1. Low back pain, unspecified back pain laterality, unspecified chronicity, unspecified whether sciatica present  M54.50 724.2   2. Intradural extramedullary spinal tumor  D49.7 239.7   3. History of benign spinal cord tumor  Z86.018 V12.49       Subjective LBP and mobility improving per pt and her mother. Primarily limited with climbing stairs. HEP going well.     Objective   See Exercise, Manual, and Modality Logs for complete treatment.       Assessment/Plan Good response to rx today. Tolerated stair climbing independently.       Timed:         Manual Therapy:         mins  33010;     Therapeutic Exercise:    25     mins  88789;     Neuromuscular Dmitry:    15   mins  26188;    Therapeutic Activity:          mins  27933;     Gait Training:           mins  36914;     Ultrasound:          mins  32508;    Ionto                                   mins   45345  Self Care                            mins   24986      Un-Timed:  Electrical Stimulation:         mins  98249 ( );  Dry Needling          mins self-pay  Traction          mins 99467  Canalith Repos         mins 13419    Timed Treatment:   40   mins   Total Treatment:     40   mins      Adrián Atwood PT, PT, DPT, OCS  [place] license: [id number]

## 2025-02-06 ENCOUNTER — TREATMENT (OUTPATIENT)
Dept: PHYSICAL THERAPY | Facility: CLINIC | Age: 40
End: 2025-02-06
Payer: MEDICARE

## 2025-02-06 DIAGNOSIS — M54.50 LOW BACK PAIN, UNSPECIFIED BACK PAIN LATERALITY, UNSPECIFIED CHRONICITY, UNSPECIFIED WHETHER SCIATICA PRESENT: Primary | ICD-10-CM

## 2025-02-06 DIAGNOSIS — D49.7 INTRADURAL EXTRAMEDULLARY SPINAL TUMOR: ICD-10-CM

## 2025-02-06 DIAGNOSIS — Z86.018 HISTORY OF BENIGN SPINAL CORD TUMOR: ICD-10-CM

## 2025-02-06 NOTE — PROGRESS NOTES
Physical Therapy Daily Treatment Note      Patient: Elyssa Olivarez   : 1985  Referring practitioner: Alesia Hodge PA-C  Date of Initial Visit: Type: THERAPY  Noted: 2025  Today's Date: 2025  Patient seen for 5 sessions       Visit Diagnoses:    ICD-10-CM ICD-9-CM   1. Low back pain, unspecified back pain laterality, unspecified chronicity, unspecified whether sciatica present  M54.50 724.2   2. Intradural extramedullary spinal tumor  D49.7 239.7   3. History of benign spinal cord tumor  Z86.018 V12.49       Subjective No new complaints. Feeling stronger. HEP going well.     Objective   See Exercise, Manual, and Modality Logs for complete treatment.       Assessment/Plan Continue pending neurosurgery visit tomorrow.       Timed:         Manual Therapy:         mins  04773;     Therapeutic Exercise:    30     mins  58191;     Neuromuscular Dmitry:    10    mins  12671;    Therapeutic Activity:          mins  44219;     Gait Training:           mins  90400;     Ultrasound:          mins  66721;    Ionto                                   mins   92798  Self Care                            mins   75824      Un-Timed:  Electrical Stimulation:         mins  60541 ( );  Dry Needling          mins self-pay  Traction          mins 63679  Canalith Repos         mins 48662    Timed Treatment:   40   mins   Total Treatment:     40   mins      Adrián Atwood PT, PT, DPT, OCS  [place] license: [id number]

## 2025-02-07 ENCOUNTER — LAB (OUTPATIENT)
Dept: LAB | Facility: HOSPITAL | Age: 40
End: 2025-02-07
Payer: MEDICARE

## 2025-02-07 ENCOUNTER — OFFICE VISIT (OUTPATIENT)
Age: 40
End: 2025-02-07
Payer: MEDICARE

## 2025-02-07 VITALS
RESPIRATION RATE: 16 BRPM | SYSTOLIC BLOOD PRESSURE: 123 MMHG | TEMPERATURE: 97.3 F | WEIGHT: 250.3 LBS | DIASTOLIC BLOOD PRESSURE: 94 MMHG | BODY MASS INDEX: 46.06 KG/M2 | OXYGEN SATURATION: 98 % | HEART RATE: 104 BPM | HEIGHT: 62 IN

## 2025-02-07 DIAGNOSIS — E23.6 PITUITARY CYST: ICD-10-CM

## 2025-02-07 DIAGNOSIS — E23.6 PITUITARY CYST: Primary | ICD-10-CM

## 2025-02-07 NOTE — PROGRESS NOTES
Patient ID: Elyssa Olivarez is a 39 y.o. female is being seen for consultation today at the request of Delmar Rodriguez IV, MD for pituitary tumor.    Imaging: MRI of the brain completed on 12/06/2024    Subjective     The patient is here in regards to   Chief Complaint   Patient presents with    Brain Tumor       History of Present Illness  Janki recently had surgery with Dr. Rodriguez for removal of a lumbar schwannoma has done well from that.  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  Denies any new neurological issues although she does have some occasional blurry vision and right-sided headaches      While in the room and during my examination of the patient I wore a mask and eye protection.  I washed my hands before and after this patient encounter.  The patient was also wearing a mask.    The following portions of the patient's history were reviewed and updated as appropriate: allergies, current medications, past family history, past medical history, past social history, past surgical history and problem list.    Review of Systems   Constitutional:  Negative for fever.   HENT:  Negative for congestion and tinnitus.    Eyes:  Positive for visual disturbance.   Respiratory:  Negative for chest tightness and shortness of breath.    Cardiovascular:  Negative for chest pain.   Gastrointestinal:  Positive for nausea. Negative for vomiting.   Endocrine: Negative for cold intolerance and heat intolerance.   Genitourinary:  Negative for difficulty urinating.   Musculoskeletal:  Positive for back pain. Negative for neck pain and neck stiffness.   Skin:  Negative for rash.   Allergic/Immunologic: Positive for food allergies (orange).   Neurological:  Positive for weakness, light-headedness and headaches. Negative for dizziness and numbness.   Hematological:  Bruises/bleeds easily.   Psychiatric/Behavioral:  Negative for confusion and decreased concentration.         Past Medical History:    Diagnosis Date    Allergic     Ankle pain, chronic     right    Autism disorder     Depression     Goiter     Hearing loss     Hip dysplasia, congenital     Left    Hyperglycemia     Hyperlipidemia     Migraine headache     Mood disorder     MRSA infection     Obesity     Partial hearing loss     Pupil dilation     right    Right foot pain     Chronic       Allergies   Allergen Reactions    Rhea Concentrate [Flavoring Agent (Non-Screening)] Hives     Orange ibuprofen       Family History   Problem Relation Age of Onset    Congenital heart disease Mother     Atrial fibrillation Mother     Heart disease Father     Heart disease Maternal Uncle     Heart disease Maternal Grandmother     Heart disease Paternal Grandmother        Social History     Socioeconomic History    Marital status: Single   Tobacco Use    Smoking status: Never     Passive exposure: Never    Smokeless tobacco: Never   Vaping Use    Vaping status: Never Used    Passive vaping exposure: Yes (occ)   Substance and Sexual Activity    Alcohol use: No    Drug use: No    Sexual activity: Defer       Past Surgical History:   Procedure Laterality Date    ANKLE SURGERY Bilateral     GALLBLADDER SURGERY      HYSTERECTOMY      LUMBAR SPINAL TUMOR REMOVAL N/A 12/30/2024    Procedure: Lumbar 4 to sacral 1 laminectomy for resection of intradural tumor;  Surgeon: Delmar Rodriguez IV, MD;  Location: [facility] MAIN OR;  Service: Neurosurgery;  Laterality: N/A;    TYMPANOPLASTY           Objective     Vitals:    02/07/25 1214   BP: 123/94   Pulse: 104   Resp: 16   Temp: 97.3 °F (36.3 °C)   SpO2: 98%     Body mass index is 45.78 kg/m².    Physical Exam  Constitutional:       General: She is awake.   HENT:      Head: Normocephalic and atraumatic.   Eyes:      General: Lids are normal.      Extraocular Movements: Extraocular movements intact.      Conjunctiva/sclera: Conjunctivae normal.      Pupils: Pupils are equal, round, and reactive to light.   Pulmonary:      Breath  sounds: Normal breath sounds.   Abdominal:      Palpations: Abdomen is soft.   Musculoskeletal:         General: Normal range of motion.   Skin:     General: Skin is warm and dry.   Neurological:      Mental Status: She is alert.      Coordination: Coordination is intact.      Deep Tendon Reflexes: Reflexes are normal and symmetric.   Psychiatric:         Behavior: Behavior is cooperative.         Neurological Exam  Mental Status  Awake and alert. Oriented to person, place and time.    Cranial Nerves  CN II: Visual acuity is normal. Visual fields full to confrontation.  CN III, IV, VI: Extraocular movements intact bilaterally. Normal lids and orbits bilaterally. Pupils equal round and reactive to light bilaterally.  CN V: Facial sensation is normal.  CN VII: Full and symmetric facial movement.  CN IX, X: Palate elevates symmetrically. Normal gag reflex.  CN XI: Shoulder shrug strength is normal.  CN XII: Tongue midline without atrophy or fasciculations.    Motor                                               Right                     Left  Deltoid                                   5                          5   Biceps                                   5                          5   Triceps                                  5                          5   Iliopsoas                               5                          5   Quadriceps                           5                          5   Hamstring                             5                          5   Gastrocnemius                     5                           5   Anterior tibialis                      5                          5    Sensory  Sensation is intact to light touch, pinprick, vibration and proprioception in all four extremities.    Reflexes  Deep tendon reflexes are 2+ and symmetric in all four extremities.    Coordination    Finger-to-nose, rapid alternating movements and heel-to-shin normal bilaterally without dysmetria.    Gait  Normal casual,  toe, heel and tandem gait.      Assessment & Plan   Independent Review of Radiographic Studies:      I personally reviewed the images from the following studies.    FINDINGS:    MR: MRI of the brain w/wo contrast was reviewed and shows 13 x 8 mm cyst within the pituitary gland likely Rathke's cleft cyst.    Assessment/Plan: Clear Rathke's cleft cyst.  Will obtain dedicated pituitary imaging as well as pituitary hormone labs.    Medical Decision Making:      Anterior pituitary hormone labs  MRI pituitary with and without contrast         Diagnoses and all orders for this visit:    1. Pituitary cyst (Primary)  -     MRI Pituitary With & Without Contrast; Future  -     Prolactin; Future  -     ACTH; Future  -     TSH; Future  -     FSH & LH; Future  -     Insulin-like Growth Factor; Future  -     Growth Hormone; Future             Patient Instructions/Recommendations:    Follow-up after imaging completed      Jarod Crawford MD  02/07/25  12:55 EST

## 2025-02-10 DIAGNOSIS — D49.7 INTRADURAL EXTRAMEDULLARY SPINAL TUMOR: ICD-10-CM

## 2025-02-10 DIAGNOSIS — M54.31 BILATERAL SCIATICA: ICD-10-CM

## 2025-02-10 DIAGNOSIS — M54.32 BILATERAL SCIATICA: ICD-10-CM

## 2025-02-10 DIAGNOSIS — F41.1 GENERALIZED ANXIETY DISORDER: ICD-10-CM

## 2025-02-10 RX ORDER — LORAZEPAM 1 MG/1
1 TABLET ORAL EVERY 8 HOURS PRN
Qty: 30 TABLET | Refills: 3 | Status: SHIPPED | OUTPATIENT
Start: 2025-02-10

## 2025-02-11 ENCOUNTER — TREATMENT (OUTPATIENT)
Dept: PHYSICAL THERAPY | Facility: CLINIC | Age: 40
End: 2025-02-11
Payer: MEDICARE

## 2025-02-11 DIAGNOSIS — D49.7 INTRADURAL EXTRAMEDULLARY SPINAL TUMOR: ICD-10-CM

## 2025-02-11 DIAGNOSIS — Z86.018 HISTORY OF BENIGN SPINAL CORD TUMOR: ICD-10-CM

## 2025-02-11 DIAGNOSIS — M54.50 LOW BACK PAIN, UNSPECIFIED BACK PAIN LATERALITY, UNSPECIFIED CHRONICITY, UNSPECIFIED WHETHER SCIATICA PRESENT: Primary | ICD-10-CM

## 2025-02-11 PROCEDURE — 97110 THERAPEUTIC EXERCISES: CPT | Performed by: PHYSICAL THERAPIST

## 2025-02-11 PROCEDURE — 97112 NEUROMUSCULAR REEDUCATION: CPT | Performed by: PHYSICAL THERAPIST

## 2025-02-11 RX ORDER — MELOXICAM 15 MG/1
15 TABLET ORAL DAILY
Qty: 90 TABLET | Refills: 1 | Status: SHIPPED | OUTPATIENT
Start: 2025-02-11

## 2025-02-11 RX ORDER — ONDANSETRON 4 MG/1
4 TABLET, FILM COATED ORAL EVERY 8 HOURS PRN
Qty: 15 TABLET | Refills: 0 | Status: SHIPPED | OUTPATIENT
Start: 2025-02-11

## 2025-02-11 RX ORDER — HYDROCODONE BITARTRATE AND ACETAMINOPHEN 5; 325 MG/1; MG/1
1 TABLET ORAL EVERY 4 HOURS PRN
Qty: 30 TABLET | Refills: 0 | Status: SHIPPED | OUTPATIENT
Start: 2025-02-11

## 2025-02-11 NOTE — TELEPHONE ENCOUNTER
Rx Refill Note  Requested Prescriptions     Pending Prescriptions Disp Refills    ondansetron (Zofran) 4 MG tablet 15 tablet 0     Sig: Take 1 tablet by mouth Every 8 (Eight) Hours As Needed for Nausea or Vomiting.      Last office visit with prescribing clinician: Visit date not found   Last telemedicine visit with prescribing clinician: Visit date not found   Next office visit with prescribing clinician: Visit date not found                         Would you like a call back once the refill request has been completed: [] Yes [] No    If the office needs to give you a call back, can they leave a voicemail: [] Yes [] No    Galindo Merino MA  02/11/25, 09:28 EST

## 2025-02-11 NOTE — TELEPHONE ENCOUNTER
Rx Refill Note  Requested Prescriptions     Pending Prescriptions Disp Refills    HYDROcodone-acetaminophen (Norco) 5-325 MG per tablet 30 tablet 0     Sig: Take 1 tablet by mouth Every 4 (Four) Hours As Needed for Severe Pain.      Last office visit with prescribing clinician: 1/13/2025   Last telemedicine visit with prescribing clinician: Visit date not found   Next office visit with prescribing clinician: Visit date not found                         Would you like a call back once the refill request has been completed: [] Yes [] No    If the office needs to give you a call back, can they leave a voicemail: [] Yes [] No    Galindo Merino MA  02/11/25, 09:29 EST

## 2025-02-11 NOTE — PROGRESS NOTES
Physical Therapy Daily Treatment Note      Patient: Elyssa Olivarez   : 1985  Referring practitioner: Alesia Hodge PA-C  Date of Initial Visit: Type: THERAPY  Noted: 2025  Today's Date: 2025  Patient seen for 6 sessions       Visit Diagnoses:    ICD-10-CM ICD-9-CM   1. Low back pain, unspecified back pain laterality, unspecified chronicity, unspecified whether sciatica present  M54.50 724.2   2. Intradural extramedullary spinal tumor  D49.7 239.7   3. History of benign spinal cord tumor  Z86.018 V12.49       Subjective Pt feels LBP and mobility are improving. HEP going well.     Objective   See Exercise, Manual, and Modality Logs for complete treatment.       Assessment/Plan Progressing well. Will continue as tolerated.      Timed:         Manual Therapy:         mins  70553;     Therapeutic Exercise:    30     mins  36949;     Neuromuscular Dmitry:    10    mins  99994;    Therapeutic Activity:          mins  41185;     Gait Training:           mins  89409;     Ultrasound:          mins  18204;    Ionto                                   mins   84116  Self Care                            mins   52896      Un-Timed:  Electrical Stimulation:         mins  60807 ( );  Dry Needling          mins self-pay  Traction          mins 91867  Canalith Repos         mins 83958    Timed Treatment:   40   mins   Total Treatment:     40   mins      Adrián Atwood PT, PT, DPT, OCS  [place] license: [id number]

## 2025-02-13 ENCOUNTER — TELEPHONE (OUTPATIENT)
Dept: FAMILY MEDICINE CLINIC | Facility: CLINIC | Age: 40
End: 2025-02-13
Payer: MEDICARE

## 2025-02-13 ENCOUNTER — TELEPHONE (OUTPATIENT)
Dept: NEUROSURGERY | Facility: CLINIC | Age: 40
End: 2025-02-13

## 2025-02-13 NOTE — TELEPHONE ENCOUNTER
Caller: IVON    Relationship: CLAUDIA    Best call back number: [phone]    What was the call regarding: IVON FROM [facility] CALLED REGARDING MRI PITUITARY.  STATES THAT AN MRI WAS ORDERED BUT A PRIOR AUTH NEEDS TO BE COMPLETED BEFORE.     IF ANY QUESTIONS PLEASE CALL CLAUDIA  THANK YOU

## 2025-02-13 NOTE — TELEPHONE ENCOUNTER
Caller: GENESIS TAYLOR    Relationship to patient:   INSURANCE     Best call back number:     [phone]       Provider:     Medication PA needed: MEDICATION- [place] 1000 UNITS    ALSO, INCONTINENCE SUPPLIES     Reason for call/Prior Auth: MEDICATION- SHE STATES THIS MEDICATION IS NON FORMULARY BUT THE PATIENT NEEDS TO TAKE THE MEDICATION.    KELSY STATES THE PRIOR AUTHORIZATION FOR THE SUPPLIES NEED TO GO TO [facility] MEDICAL SUPPLY. THE PHONE -[phone]      PRIOR AUTHORIZATION DEPARTMENT FOR HUMAN (MEDICATION)- [phone]    KELSY STATES THE PRIOR AUTHORIZATION FOR THE MEDICATION IS VERY URGENT

## 2025-02-14 RX ORDER — BROMPHENIRAMINE MALEATE, PSEUDOEPHEDRINE HYDROCHLORIDE, AND DEXTROMETHORPHAN HYDROBROMIDE 2; 30; 10 MG/5ML; MG/5ML; MG/5ML
5 SYRUP ORAL 4 TIMES DAILY PRN
Qty: 473 ML | Refills: 0 | Status: SHIPPED | OUTPATIENT
Start: 2025-02-14

## 2025-02-14 RX ORDER — ONDANSETRON 4 MG/1
4 TABLET, FILM COATED ORAL EVERY 8 HOURS PRN
Qty: 30 TABLET | Refills: 0 | Status: SHIPPED | OUTPATIENT
Start: 2025-02-14

## 2025-02-18 ENCOUNTER — TELEPHONE (OUTPATIENT)
Dept: FAMILY MEDICINE CLINIC | Facility: CLINIC | Age: 40
End: 2025-02-18

## 2025-02-18 NOTE — TELEPHONE ENCOUNTER
Caller: FANY    Relationship: Home Health    Best call back number: [phone]    FANY HOUSE CALLED WITH [facility],   CALLBACK [phone]    THEY FAXED US A PLAN OF CARE ON  2/14/25  [facility] NEEDS THIS SIGNED IN ORDER TO START A PRIOR AUTHORIZATION ON SERVICES THEY OFFER PATIENT IN HER HOME.  ORDER NUMBER [id number]       PLEASE ADVISE

## 2025-02-26 DIAGNOSIS — D49.7 INTRADURAL EXTRAMEDULLARY SPINAL TUMOR: ICD-10-CM

## 2025-02-26 DIAGNOSIS — F41.1 GENERALIZED ANXIETY DISORDER: ICD-10-CM

## 2025-02-26 DIAGNOSIS — M54.32 BILATERAL SCIATICA: ICD-10-CM

## 2025-02-26 DIAGNOSIS — M54.31 BILATERAL SCIATICA: ICD-10-CM

## 2025-02-26 RX ORDER — GABAPENTIN 100 MG/1
100 CAPSULE ORAL 3 TIMES DAILY
Qty: 90 CAPSULE | Refills: 3 | Status: SHIPPED | OUTPATIENT
Start: 2025-02-26

## 2025-02-26 RX ORDER — CYCLOBENZAPRINE HCL 10 MG
10 TABLET ORAL NIGHTLY
Qty: 15 TABLET | Refills: 0 | Status: SHIPPED | OUTPATIENT
Start: 2025-02-26

## 2025-02-26 RX ORDER — MELOXICAM 15 MG/1
15 TABLET ORAL DAILY
Qty: 90 TABLET | Refills: 1 | Status: SHIPPED | OUTPATIENT
Start: 2025-02-26

## 2025-02-26 NOTE — TELEPHONE ENCOUNTER
Rx Refill Note  Requested Prescriptions     Pending Prescriptions Disp Refills    ondansetron (Zofran) 4 MG tablet 15 tablet 0     Sig: Take 1 tablet by mouth Every 8 (Eight) Hours As Needed for Nausea or Vomiting.    HYDROcodone-acetaminophen (Norco) 5-325 MG per tablet 30 tablet 0     Sig: Take 1 tablet by mouth Every 4 (Four) Hours As Needed for Severe Pain.      Last office visit with prescribing clinician: Visit date not found   Last telemedicine visit with prescribing clinician: Visit date not found   Next office visit with prescribing clinician: Visit date not found                         Would you like a call back once the refill request has been completed: [] Yes [] No    If the office needs to give you a call back, can they leave a voicemail: [] Yes [] No    Galindo Merino MA  02/26/25, 16:20 EST

## 2025-02-26 NOTE — TELEPHONE ENCOUNTER
Rx Refill Note  Requested Prescriptions     Pending Prescriptions Disp Refills    gabapentin (NEURONTIN) 100 MG capsule 90 capsule 3     Sig: Take 1 capsule by mouth 3 (Three) Times a Day.      Last office visit with prescribing clinician: 12/9/2024   Last telemedicine visit with prescribing clinician: Visit date not found   Next office visit with prescribing clinician: 3/3/2025                         Would you like a call back once the refill request has been completed: [] Yes [] No    If the office needs to give you a call back, can they leave a voicemail: [] Yes [] No    Galindo Merino MA  02/26/25, 16:18 EST

## 2025-02-27 RX ORDER — ONDANSETRON 4 MG/1
4 TABLET, FILM COATED ORAL EVERY 8 HOURS PRN
Qty: 30 TABLET | Refills: 0 | Status: SHIPPED | OUTPATIENT
Start: 2025-02-27

## 2025-02-27 RX ORDER — HYDROCODONE BITARTRATE AND ACETAMINOPHEN 5; 325 MG/1; MG/1
1 TABLET ORAL EVERY 4 HOURS PRN
Qty: 30 TABLET | Refills: 0 | OUTPATIENT
Start: 2025-02-27

## 2025-02-27 RX ORDER — TRAZODONE HYDROCHLORIDE 50 MG/1
50 TABLET ORAL NIGHTLY
Qty: 90 TABLET | Refills: 3 | Status: SHIPPED | OUTPATIENT
Start: 2025-02-27

## 2025-02-27 RX ORDER — LORAZEPAM 1 MG/1
1 TABLET ORAL EVERY 8 HOURS PRN
Qty: 30 TABLET | Refills: 3 | Status: SHIPPED | OUTPATIENT
Start: 2025-02-27

## 2025-02-27 RX ORDER — ONDANSETRON 4 MG/1
4 TABLET, FILM COATED ORAL EVERY 8 HOURS PRN
Qty: 15 TABLET | Refills: 0 | OUTPATIENT
Start: 2025-02-27

## 2025-03-03 ENCOUNTER — OFFICE VISIT (OUTPATIENT)
Dept: NEUROSURGERY | Facility: CLINIC | Age: 40
End: 2025-03-03
Payer: MEDICARE

## 2025-03-03 VITALS
SYSTOLIC BLOOD PRESSURE: 129 MMHG | OXYGEN SATURATION: 97 % | HEIGHT: 62 IN | WEIGHT: 254 LBS | BODY MASS INDEX: 46.74 KG/M2 | DIASTOLIC BLOOD PRESSURE: 86 MMHG | HEART RATE: 92 BPM

## 2025-03-03 DIAGNOSIS — D49.7 INTRADURAL EXTRAMEDULLARY SPINAL TUMOR: Primary | ICD-10-CM

## 2025-03-03 PROCEDURE — 1160F RVW MEDS BY RX/DR IN RCRD: CPT | Performed by: NEUROLOGICAL SURGERY

## 2025-03-03 PROCEDURE — 99024 POSTOP FOLLOW-UP VISIT: CPT | Performed by: NEUROLOGICAL SURGERY

## 2025-03-03 PROCEDURE — 1159F MED LIST DOCD IN RCRD: CPT | Performed by: NEUROLOGICAL SURGERY

## 2025-03-03 NOTE — PROGRESS NOTES
"Subjective   History of Present Illness: Elyssa Olivarez is a 39 y.o. female is here today for follow-up. Today patient reports improvement of preoperative symptoms with some residual leg numbness.  Otherwise doing well    Previous treatment: MObic, Gabapentin, Flexeril,     Previous neurosurgery:   Lumbar 4 to sacral 1 laminectomy for resection or intradural tumor 12/30/24        Previous injections:     The following portions of the patient's history were reviewed and updated as appropriate: allergies, current medications, past family history, past medical history, past social history, past surgical history, and problem list.    Review of Systems   Constitutional:  Positive for activity change.   HENT: Negative.     Eyes: Negative.    Respiratory: Negative.     Cardiovascular: Negative.    Gastrointestinal: Negative.    Endocrine: Negative.    Genitourinary: Negative.    Musculoskeletal:  Positive for arthralgias and myalgias.        Bilateral leg pain occasionally   Skin: Negative.    Allergic/Immunologic: Negative.    Neurological:  Positive for weakness (right leg).   Psychiatric/Behavioral: Negative.         Objective      /86 (BP Location: Left arm, Patient Position: Sitting)   Pulse 92   Ht 157.5 cm (62\")   Wt 115 kg (254 lb)   SpO2 97%   BMI 46.46 kg/m²    Body mass index is 46.46 kg/m².  Vitals:    03/03/25 1012   PainSc: 0-No pain         Neurological Exam        Assessment & Plan   Independent Review of Radiographic Studies:      I personally reviewed and interpreted the images from the following studies.    No new image    Medical Decision Making:      Elyssa Olivarez is a 39 y.o. female status post resection of intradural tumor with pathology consistent with schwannoma.  Overall patient is doing well at 3 months postop.  No limitations from my perspective.  Will see her back in 3 months with an MRI of the lumbar spine with and without contrast.  Assuming this looks good we would " repeat the MRI after about a year.  Patient is okay to participate in [employer]      Diagnoses and all orders for this visit:    1. Intradural extramedullary spinal tumor (Primary)      No follow-ups on file.    This patient was examined wearing appropriate personal protective equipment.                      Dr. Delmar Rodriguez IV    03/03/25  10:51 EST

## 2025-03-04 ENCOUNTER — TELEPHONE (OUTPATIENT)
Dept: NEUROSURGERY | Facility: CLINIC | Age: 40
End: 2025-03-04
Payer: MEDICARE

## 2025-03-04 NOTE — TELEPHONE ENCOUNTER
Patient's mother call asking by any chance they would be able to sedate patient for mRI. Patient has Autism and she does not think she would be able to lay still. Also patient will need an appointment to see DR. Crawford after MRI is complete.

## 2025-03-05 DIAGNOSIS — M54.32 BILATERAL SCIATICA: ICD-10-CM

## 2025-03-05 DIAGNOSIS — D49.7 INTRADURAL EXTRAMEDULLARY SPINAL TUMOR: Primary | ICD-10-CM

## 2025-03-05 DIAGNOSIS — M54.31 BILATERAL SCIATICA: ICD-10-CM

## 2025-03-05 DIAGNOSIS — F40.240 CLAUSTROPHOBIA: Primary | ICD-10-CM

## 2025-03-05 RX ORDER — DIAZEPAM 5 MG/1
5 TABLET ORAL
Qty: 1 TABLET | Refills: 0 | Status: SHIPPED | OUTPATIENT
Start: 2025-03-05 | End: 2025-03-05

## 2025-03-06 DIAGNOSIS — D49.7 INTRADURAL EXTRAMEDULLARY SPINAL TUMOR: ICD-10-CM

## 2025-03-06 DIAGNOSIS — M54.6 CHRONIC MIDLINE THORACIC BACK PAIN: ICD-10-CM

## 2025-03-06 DIAGNOSIS — G95.89 MASS OF SPINAL CORD: Primary | ICD-10-CM

## 2025-03-06 DIAGNOSIS — G89.29 CHRONIC MIDLINE THORACIC BACK PAIN: ICD-10-CM

## 2025-03-06 RX ORDER — HYDROCODONE BITARTRATE AND ACETAMINOPHEN 5; 325 MG/1; MG/1
1 TABLET ORAL EVERY 6 HOURS PRN
Qty: 30 TABLET | Refills: 0 | Status: SHIPPED | OUTPATIENT
Start: 2025-03-06

## 2025-03-06 RX ORDER — MELOXICAM 15 MG/1
15 TABLET ORAL DAILY
Qty: 90 TABLET | Refills: 1 | Status: SHIPPED | OUTPATIENT
Start: 2025-03-06

## 2025-03-06 RX ORDER — ONDANSETRON 4 MG/1
4 TABLET, FILM COATED ORAL EVERY 8 HOURS PRN
Qty: 30 TABLET | Refills: 0 | Status: SHIPPED | OUTPATIENT
Start: 2025-03-06

## 2025-03-06 RX ORDER — EZETIMIBE 10 MG/1
10 TABLET ORAL DAILY
Qty: 90 TABLET | Refills: 3 | Status: SHIPPED | OUTPATIENT
Start: 2025-03-06

## 2025-03-06 RX ORDER — TRAZODONE HYDROCHLORIDE 50 MG/1
50 TABLET ORAL NIGHTLY
Qty: 90 TABLET | Refills: 3 | Status: SHIPPED | OUTPATIENT
Start: 2025-03-06

## 2025-03-07 ENCOUNTER — HOSPITAL ENCOUNTER (OUTPATIENT)
Dept: MRI IMAGING | Facility: HOSPITAL | Age: 40
Discharge: HOME | End: 2025-03-07
Payer: MEDICARE

## 2025-03-07 ENCOUNTER — LAB (OUTPATIENT)
Dept: LAB | Facility: HOSPITAL | Age: 40
End: 2025-03-07
Payer: MEDICARE

## 2025-03-07 DIAGNOSIS — E23.6 PITUITARY CYST: ICD-10-CM

## 2025-03-07 LAB
FSH SERPL-ACNC: 5.68 MIU/ML
LH SERPL-ACNC: 4.35 MIU/ML
PROLACTIN SERPL-MCNC: 23.2 NG/ML (ref 4.79–23.3)
TSH SERPL DL<=0.05 MIU/L-ACNC: 2.8 UIU/ML (ref 0.27–4.2)

## 2025-03-07 PROCEDURE — 70553 MRI BRAIN STEM W/O & W/DYE: CPT

## 2025-03-07 PROCEDURE — 83002 ASSAY OF GONADOTROPIN (LH): CPT

## 2025-03-07 PROCEDURE — A9579 GAD-BASE MR CONTRAST NOS,1ML: HCPCS | Performed by: NEUROLOGICAL SURGERY

## 2025-03-07 PROCEDURE — 83001 ASSAY OF GONADOTROPIN (FSH): CPT

## 2025-03-07 PROCEDURE — 84443 ASSAY THYROID STIM HORMONE: CPT

## 2025-03-07 PROCEDURE — 83003 ASSAY GROWTH HORMONE (HGH): CPT

## 2025-03-07 PROCEDURE — 84305 ASSAY OF SOMATOMEDIN: CPT

## 2025-03-07 PROCEDURE — 36415 COLL VENOUS BLD VENIPUNCTURE: CPT

## 2025-03-07 PROCEDURE — 82024 ASSAY OF ACTH: CPT

## 2025-03-07 PROCEDURE — 25010000002 GADOTERIDOL PER 1 ML: Performed by: NEUROLOGICAL SURGERY

## 2025-03-07 RX ADMIN — GADOTERIDOL 23 ML: 279.3 INJECTION, SOLUTION INTRAVENOUS at 14:31

## 2025-03-09 LAB
ACTH PLAS-MCNC: 5.7 PG/ML (ref 7.2–63.3)
IGF-I SERPL-MCNC: 81 NG/ML (ref 79–259)

## 2025-03-10 LAB — GH SERPL-MCNC: <0.1 NG/ML (ref 0–10)

## 2025-03-11 DIAGNOSIS — M54.32 BILATERAL SCIATICA: ICD-10-CM

## 2025-03-11 DIAGNOSIS — M54.31 BILATERAL SCIATICA: ICD-10-CM

## 2025-03-11 DIAGNOSIS — F41.1 GENERALIZED ANXIETY DISORDER: ICD-10-CM

## 2025-03-11 RX ORDER — MELOXICAM 15 MG/1
15 TABLET ORAL DAILY
Qty: 90 TABLET | Refills: 1 | Status: SHIPPED | OUTPATIENT
Start: 2025-03-11

## 2025-03-11 RX ORDER — GABAPENTIN 100 MG/1
100 CAPSULE ORAL 3 TIMES DAILY
Qty: 90 CAPSULE | Refills: 3 | OUTPATIENT
Start: 2025-03-11

## 2025-03-11 NOTE — TELEPHONE ENCOUNTER
I just refilled it   
Rx Refill Note  Requested Prescriptions     Pending Prescriptions Disp Refills    gabapentin (NEURONTIN) 100 MG capsule 90 capsule 3     Sig: Take 1 capsule by mouth 3 (Three) Times a Day.      Last office visit with prescribing clinician: 1/13/2025   Last telemedicine visit with prescribing clinician: Visit date not found   Next office visit with prescribing clinician: Visit date not found                         Would you like a call back once the refill request has been completed: [] Yes [] No    If the office needs to give you a call back, can they leave a voicemail: [] Yes [] No    Galindo Merino MA  03/11/25, 13:40 EDT  
oriented to person, place, time/situation/awake/alert

## 2025-03-13 RX ORDER — LORAZEPAM 1 MG/1
1 TABLET ORAL EVERY 8 HOURS PRN
Qty: 30 TABLET | Refills: 3 | Status: SHIPPED | OUTPATIENT
Start: 2025-03-13

## 2025-03-13 RX ORDER — EZETIMIBE 10 MG/1
10 TABLET ORAL DAILY
Qty: 90 TABLET | Refills: 3 | Status: SHIPPED | OUTPATIENT
Start: 2025-03-13

## 2025-03-13 RX ORDER — TRAZODONE HYDROCHLORIDE 50 MG/1
50 TABLET ORAL NIGHTLY
Qty: 90 TABLET | Refills: 3 | Status: SHIPPED | OUTPATIENT
Start: 2025-03-13

## 2025-03-13 NOTE — PROGRESS NOTES
Patient ID: Elyssa Olivarez is a 39 y.o. female is here today for follow-up for pituitary cyst.    Imaging: MRI of the pituitary completed on 03/07/2025    Subjective     The patient is here in regards to   Chief Complaint   Patient presents with    Brain Tumor    Follow-up       History of Present Illness  Janki continues to have occasional blurry vision and headaches.  Denies any new neurological issues.      While in the room and during my examination of the patient I wore a mask and eye protection.  I washed my hands before and after this patient encounter.  The patient was also wearing a mask.    The following portions of the patient's history were reviewed and updated as appropriate: allergies, current medications, past family history, past medical history, past social history, past surgical history and problem list.    Review of Systems   HENT:  Positive for tinnitus.    Eyes:  Positive for visual disturbance (right eye - blurry).   Gastrointestinal:  Positive for nausea. Negative for vomiting.   Musculoskeletal:  Negative for neck pain and neck stiffness.   Neurological:  Positive for dizziness, weakness, light-headedness and headaches.   Hematological:  Does not bruise/bleed easily.   Psychiatric/Behavioral:  Positive for decreased concentration. Negative for confusion.         Past Medical History:   Diagnosis Date    Allergic     Ankle pain, chronic     right    Autism disorder     Depression     Goiter     Hearing loss     Hip dysplasia, congenital     Left    Hyperglycemia     Hyperlipidemia     Migraine headache     Mood disorder     MRSA infection     Obesity     Partial hearing loss     Pupil dilation     right    Right foot pain     Chronic       Allergies   Allergen Reactions    Deuel Concentrate [Flavoring Agent (Non-Screening)] Hives     Orange ibuprofen       Family History   Problem Relation Age of Onset    Congenital heart disease Mother     Atrial fibrillation Mother     Heart disease  Father     Heart disease Maternal Uncle     Heart disease Maternal Grandmother     Heart disease Paternal Grandmother        Social History     Socioeconomic History    Marital status: Single   Tobacco Use    Smoking status: Never     Passive exposure: Never    Smokeless tobacco: Never   Vaping Use    Vaping status: Never Used    Passive vaping exposure: Yes (occ)   Substance and Sexual Activity    Alcohol use: No    Drug use: No    Sexual activity: Defer       Past Surgical History:   Procedure Laterality Date    ANKLE SURGERY Bilateral     GALLBLADDER SURGERY      HYSTERECTOMY      LUMBAR SPINAL TUMOR REMOVAL N/A 12/30/2024    Procedure: Lumbar 4 to sacral 1 laminectomy for resection of intradural tumor;  Surgeon: Delmar Rodriguez IV, MD;  Location: [facility] MAIN OR;  Service: Neurosurgery;  Laterality: N/A;    TYMPANOPLASTY           Objective     Vitals:    03/14/25 1217   BP: 124/80   Pulse: 105   Resp: 16   Temp: 97.1 °F (36.2 °C)   SpO2: 97%     Body mass index is 45.56 kg/m².    Physical Exam  Constitutional:       General: She is awake.   HENT:      Head: Normocephalic and atraumatic.   Eyes:      General: Lids are normal.      Extraocular Movements: Extraocular movements intact.      Conjunctiva/sclera: Conjunctivae normal.      Pupils: Pupils are equal, round, and reactive to light.   Pulmonary:      Breath sounds: Normal breath sounds.   Abdominal:      Palpations: Abdomen is soft.   Musculoskeletal:         General: Normal range of motion.   Skin:     General: Skin is warm and dry.   Neurological:      Mental Status: She is alert.      Coordination: Coordination is intact.      Deep Tendon Reflexes: Reflexes are normal and symmetric.   Psychiatric:         Behavior: Behavior is cooperative.         Neurological Exam  Mental Status  Awake and alert. Oriented to person, place and time.    Cranial Nerves  CN II: Visual acuity is normal. Visual fields full to confrontation.  CN III, IV, VI: Extraocular movements  intact bilaterally. Normal lids and orbits bilaterally. Pupils equal round and reactive to light bilaterally.  CN V: Facial sensation is normal.  CN VII: Full and symmetric facial movement.  CN IX, X: Palate elevates symmetrically. Normal gag reflex.  CN XI: Shoulder shrug strength is normal.  CN XII: Tongue midline without atrophy or fasciculations.    Motor                                               Right                     Left  Deltoid                                   5                          5   Biceps                                   5                          5   Triceps                                  5                          5   Iliopsoas                               5                          5   Quadriceps                           5                          5   Hamstring                             5                          5   Gastrocnemius                     5                           5   Anterior tibialis                      5                          5    Sensory  Sensation is intact to light touch, pinprick, vibration and proprioception in all four extremities.    Reflexes  Deep tendon reflexes are 2+ and symmetric in all four extremities.    Coordination    Finger-to-nose, rapid alternating movements and heel-to-shin normal bilaterally without dysmetria.    Gait  Normal casual, toe, heel and tandem gait.      Assessment & Plan   Independent Review of Radiographic Studies:      I personally reviewed the images from the following studies.    FINDINGS:    MR: MRI of the brain w/wo contrast was reviewed and shows stable appearing 8.2 x 7.6 mm Rathke's cleft cyst with CSF signal posterior in the sella.  Not causing any optic chiasm compression or compression of the cavernous sinus.    Anterior pituitary hormone labs including TSH, LH, FSH, GH, IGF-I, prolactin all within normal limits    Assessment/Plan: Does not have a prolactinoma, she has a Rathke's cleft cyst.  The majority of her  symptoms are likely due to IIH.  I recommend weight loss, she could also consider optic nerve sheath fenestration or Diamox per her PCP or neurology.  She also has some mastoiditis on MRI but does not seem to be too symptomatic from that.    Given the difficulty associated with obtaining MRIs for this patient, I do not feel strongly about obtaining serial imaging.  We should continue to monitor symptoms.    Medical Decision Making:      Recommend conservative management for IIH         Diagnoses and all orders for this visit:    1. IIH (idiopathic intracranial hypertension) (Primary)    2. Rathke's cleft cyst             Patient Instructions/Recommendations:    Follow-up as needed      Jarod Crawford MD  03/14/25  13:02 EDT

## 2025-03-14 ENCOUNTER — OFFICE VISIT (OUTPATIENT)
Dept: NEUROSURGERY | Facility: CLINIC | Age: 40
End: 2025-03-14
Payer: MEDICARE

## 2025-03-14 VITALS
TEMPERATURE: 97.1 F | OXYGEN SATURATION: 97 % | WEIGHT: 249.1 LBS | RESPIRATION RATE: 16 BRPM | HEART RATE: 105 BPM | HEIGHT: 62 IN | SYSTOLIC BLOOD PRESSURE: 124 MMHG | DIASTOLIC BLOOD PRESSURE: 80 MMHG | BODY MASS INDEX: 45.84 KG/M2

## 2025-03-14 DIAGNOSIS — E23.6 RATHKE'S CLEFT CYST: ICD-10-CM

## 2025-03-14 DIAGNOSIS — G93.2 IIH (IDIOPATHIC INTRACRANIAL HYPERTENSION): Primary | ICD-10-CM

## 2025-03-14 RX ORDER — DIAZEPAM 5 MG/1
TABLET ORAL
COMMUNITY
Start: 2025-03-05

## 2025-03-14 RX ORDER — NYSTATIN 100000 [USP'U]/G
POWDER TOPICAL
COMMUNITY
Start: 2025-02-17

## 2025-03-17 ENCOUNTER — TELEPHONE (OUTPATIENT)
Dept: NEUROSURGERY | Facility: CLINIC | Age: 40
End: 2025-03-17

## 2025-03-17 DIAGNOSIS — M54.6 CHRONIC MIDLINE THORACIC BACK PAIN: ICD-10-CM

## 2025-03-17 DIAGNOSIS — G95.89 MASS OF SPINAL CORD: ICD-10-CM

## 2025-03-17 DIAGNOSIS — D49.7 INTRADURAL EXTRAMEDULLARY SPINAL TUMOR: ICD-10-CM

## 2025-03-17 DIAGNOSIS — G89.29 CHRONIC MIDLINE THORACIC BACK PAIN: ICD-10-CM

## 2025-03-17 RX ORDER — GABAPENTIN 300 MG/1
300 CAPSULE ORAL NIGHTLY
Qty: 30 CAPSULE | Refills: 1 | OUTPATIENT
Start: 2025-03-17

## 2025-03-17 NOTE — TELEPHONE ENCOUNTER
PT'S MOTHER JASONSHIRLEY LUONG CALLED STATED THEY SAW DR. MATAMOROS AND NOT SATISFIED WITH THE RESULTS OF THAT ENCOUNTER. THEY ARE LOOKING FOR A REFERRAL TO SOMEONE ELSE TO HANDLE HER CASE.

## 2025-03-18 RX ORDER — CYCLOBENZAPRINE HCL 10 MG
10 TABLET ORAL NIGHTLY
Qty: 15 TABLET | Refills: 0 | Status: SHIPPED | OUTPATIENT
Start: 2025-03-18

## 2025-03-18 RX ORDER — HYDROCODONE BITARTRATE AND ACETAMINOPHEN 5; 325 MG/1; MG/1
1 TABLET ORAL EVERY 6 HOURS PRN
Qty: 30 TABLET | Refills: 0 | OUTPATIENT
Start: 2025-03-18

## 2025-03-27 DIAGNOSIS — G95.89 MASS OF SPINAL CORD: ICD-10-CM

## 2025-03-27 DIAGNOSIS — F41.1 GENERALIZED ANXIETY DISORDER: ICD-10-CM

## 2025-03-27 DIAGNOSIS — M54.6 CHRONIC MIDLINE THORACIC BACK PAIN: ICD-10-CM

## 2025-03-27 DIAGNOSIS — G89.29 CHRONIC MIDLINE THORACIC BACK PAIN: ICD-10-CM

## 2025-03-27 DIAGNOSIS — D49.7 INTRADURAL EXTRAMEDULLARY SPINAL TUMOR: ICD-10-CM

## 2025-03-28 RX ORDER — LORAZEPAM 1 MG/1
1 TABLET ORAL EVERY 8 HOURS PRN
Qty: 30 TABLET | Refills: 3 | Status: SHIPPED | OUTPATIENT
Start: 2025-03-28

## 2025-03-28 RX ORDER — NYSTATIN 100000 [USP'U]/G
POWDER TOPICAL 3 TIMES DAILY
Qty: 60 G | Refills: 1 | Status: SHIPPED | OUTPATIENT
Start: 2025-03-28

## 2025-03-28 RX ORDER — CYCLOBENZAPRINE HCL 10 MG
10 TABLET ORAL NIGHTLY
Qty: 90 TABLET | Refills: 0 | Status: SHIPPED | OUTPATIENT
Start: 2025-03-28

## 2025-03-28 RX ORDER — HYDROCODONE BITARTRATE AND ACETAMINOPHEN 5; 325 MG/1; MG/1
1 TABLET ORAL EVERY 6 HOURS PRN
Qty: 30 TABLET | Refills: 0 | Status: SHIPPED | OUTPATIENT
Start: 2025-03-28

## 2025-03-31 ENCOUNTER — TELEPHONE (OUTPATIENT)
Dept: FAMILY MEDICINE CLINIC | Facility: CLINIC | Age: 40
End: 2025-03-31

## 2025-03-31 ENCOUNTER — TELEPHONE (OUTPATIENT)
Dept: FAMILY MEDICINE CLINIC | Facility: CLINIC | Age: 40
End: 2025-03-31
Payer: MEDICARE

## 2025-03-31 NOTE — TELEPHONE ENCOUNTER
Patient has a cyst on her brain that is putting pressure in her head and affecting her vision. Mom is asking for a referral to a neuro-surgeon, besides Dr Crawford. Dr Rodriguez does not do brain issues. Mom said that Britton is completely aware of all of this. Please give referral and notify patient's mother.

## 2025-04-01 DIAGNOSIS — D35.2 PITUITARY ADENOMA: Primary | ICD-10-CM

## 2025-04-04 DIAGNOSIS — M54.6 CHRONIC MIDLINE THORACIC BACK PAIN: ICD-10-CM

## 2025-04-04 DIAGNOSIS — F41.1 GENERALIZED ANXIETY DISORDER: ICD-10-CM

## 2025-04-04 DIAGNOSIS — G89.29 CHRONIC MIDLINE THORACIC BACK PAIN: ICD-10-CM

## 2025-04-04 DIAGNOSIS — G95.89 MASS OF SPINAL CORD: ICD-10-CM

## 2025-04-04 DIAGNOSIS — D49.7 INTRADURAL EXTRAMEDULLARY SPINAL TUMOR: ICD-10-CM

## 2025-04-04 RX ORDER — GABAPENTIN 100 MG/1
100 CAPSULE ORAL 3 TIMES DAILY
Qty: 90 CAPSULE | Refills: 3 | Status: SHIPPED | OUTPATIENT
Start: 2025-04-04

## 2025-04-04 NOTE — TELEPHONE ENCOUNTER
Rx Refill Note  Requested Prescriptions     Pending Prescriptions Disp Refills    gabapentin (NEURONTIN) 100 MG capsule 90 capsule 3     Sig: Take 1 capsule by mouth 3 (Three) Times a Day.      Last office visit with prescribing clinician: 1/13/2025   Last telemedicine visit with prescribing clinician: Visit date not found   Next office visit with prescribing clinician: Visit date not found                         Would you like a call back once the refill request has been completed: [] Yes [] No    If the office needs to give you a call back, can they leave a voicemail: [] Yes [] No    Galindo Merino MA  04/04/25, 15:54 EDT

## 2025-04-07 RX ORDER — PANTOPRAZOLE SODIUM 40 MG/1
40 TABLET, DELAYED RELEASE ORAL DAILY
Qty: 90 TABLET | Refills: 1 | Status: SHIPPED | OUTPATIENT
Start: 2025-04-07

## 2025-04-07 RX ORDER — ATORVASTATIN CALCIUM 80 MG/1
80 TABLET, FILM COATED ORAL DAILY
Qty: 90 TABLET | Refills: 1 | Status: SHIPPED | OUTPATIENT
Start: 2025-04-07

## 2025-04-07 RX ORDER — BROMPHENIRAMINE MALEATE, PSEUDOEPHEDRINE HYDROCHLORIDE, AND DEXTROMETHORPHAN HYDROBROMIDE 2; 30; 10 MG/5ML; MG/5ML; MG/5ML
SYRUP ORAL
Qty: 473 ML | Refills: 0 | Status: SHIPPED | OUTPATIENT
Start: 2025-04-07

## 2025-04-07 RX ORDER — ONDANSETRON 4 MG/1
TABLET, FILM COATED ORAL
Qty: 30 TABLET | Refills: 0 | Status: SHIPPED | OUTPATIENT
Start: 2025-04-07

## 2025-04-07 RX ORDER — LORAZEPAM 1 MG/1
1 TABLET ORAL EVERY 8 HOURS PRN
Qty: 30 TABLET | Refills: 3 | Status: SHIPPED | OUTPATIENT
Start: 2025-04-07

## 2025-04-07 RX ORDER — GABAPENTIN 300 MG/1
300 CAPSULE ORAL NIGHTLY
Qty: 30 CAPSULE | Refills: 1 | Status: SHIPPED | OUTPATIENT
Start: 2025-04-07

## 2025-04-07 RX ORDER — HYDROCODONE BITARTRATE AND ACETAMINOPHEN 5; 325 MG/1; MG/1
1 TABLET ORAL EVERY 6 HOURS PRN
Qty: 30 TABLET | Refills: 0 | Status: SHIPPED | OUTPATIENT
Start: 2025-04-07

## 2025-04-07 RX ORDER — SERTRALINE HYDROCHLORIDE 100 MG/1
100 TABLET, FILM COATED ORAL DAILY
Qty: 90 TABLET | Refills: 1 | Status: SHIPPED | OUTPATIENT
Start: 2025-04-07

## 2025-04-07 RX ORDER — NYSTATIN 100000 [USP'U]/G
POWDER TOPICAL 3 TIMES DAILY
Qty: 60 G | Refills: 1 | Status: SHIPPED | OUTPATIENT
Start: 2025-04-07

## 2025-04-07 RX ORDER — LAMOTRIGINE 150 MG/1
150 TABLET ORAL 2 TIMES DAILY
Qty: 180 TABLET | Refills: 1 | Status: SHIPPED | OUTPATIENT
Start: 2025-04-07

## 2025-04-07 RX ORDER — DIAZEPAM 5 MG/1
5 TABLET ORAL NIGHTLY PRN
Qty: 30 TABLET | Refills: 0 | Status: SHIPPED | OUTPATIENT
Start: 2025-04-07

## 2025-04-17 ENCOUNTER — OFFICE VISIT (OUTPATIENT)
Dept: FAMILY MEDICINE CLINIC | Facility: CLINIC | Age: 40
End: 2025-04-17
Payer: MEDICARE

## 2025-04-17 VITALS
WEIGHT: 252 LBS | OXYGEN SATURATION: 100 % | HEART RATE: 97 BPM | RESPIRATION RATE: 16 BRPM | SYSTOLIC BLOOD PRESSURE: 138 MMHG | DIASTOLIC BLOOD PRESSURE: 92 MMHG | BODY MASS INDEX: 46.38 KG/M2 | HEIGHT: 62 IN

## 2025-04-17 DIAGNOSIS — D49.7 INTRADURAL EXTRAMEDULLARY SPINAL TUMOR: ICD-10-CM

## 2025-04-17 DIAGNOSIS — R73.9 HYPERGLYCEMIA: ICD-10-CM

## 2025-04-17 DIAGNOSIS — D35.2 PITUITARY ADENOMA: ICD-10-CM

## 2025-04-17 DIAGNOSIS — G89.29 CHRONIC MIDLINE THORACIC BACK PAIN: ICD-10-CM

## 2025-04-17 DIAGNOSIS — G95.89 MASS OF SPINAL CORD: Primary | ICD-10-CM

## 2025-04-17 DIAGNOSIS — M54.6 CHRONIC MIDLINE THORACIC BACK PAIN: ICD-10-CM

## 2025-04-17 RX ORDER — GABAPENTIN 300 MG/1
600 CAPSULE ORAL NIGHTLY
Qty: 180 CAPSULE | Refills: 0 | Status: SHIPPED | OUTPATIENT
Start: 2025-04-17 | End: 2025-07-16

## 2025-04-17 RX ORDER — HYDROCODONE BITARTRATE AND ACETAMINOPHEN 5; 325 MG/1; MG/1
1 TABLET ORAL EVERY 6 HOURS PRN
Qty: 60 TABLET | Refills: 0 | Status: SHIPPED | OUTPATIENT
Start: 2025-04-17

## 2025-04-17 NOTE — PROGRESS NOTES
"Subjective   Elyssa Olivarez is a 39 y.o. female.     Chief Complaint   Patient presents with    Back Pain     Back mass f/u. Still having pain.  Stephan ankle pain      Med Management     Pain meds       /92 (BP Location: Left arm, Patient Position: Sitting, Cuff Size: Large Adult)   Pulse 97   Resp 16   Ht 157.5 cm (62\")   Wt 114 kg (252 lb)   SpO2 100%   BMI 46.09 kg/m²     BP Readings from Last 3 Encounters:   04/17/25 138/92   03/14/25 124/80   03/03/25 129/86       Wt Readings from Last 3 Encounters:   04/17/25 114 kg (252 lb)   03/14/25 113 kg (249 lb 1.6 oz)   03/03/25 115 kg (254 lb)       Back Pain     Presents to the clinic for management of back pain, ankle pain, dizziness, headaches. She has surgery on intradural extramedullary spinal tumor at the end on last year. She has an upcoming mri to assess need for another surgery going forward. Has a lot of pain especially when she is trying to sleep. Has some dizziness and headaches. She saw neurosurgery for rathkes cleft cyts and was dismissed with possible diagnosis of IIH. She has not had lumbar puncture. Has struggled with weight and is working on this.     The following portions of the patient's history were reviewed and updated as appropriate: allergies, current medications, past family history, past medical history, past social history, past surgical history, and problem list.    Review of Systems   Musculoskeletal:  Positive for back pain.       Objective   Physical Exam  Constitutional:       Appearance: She is well-developed. She is obese.   Eyes:      Conjunctiva/sclera: Conjunctivae normal.      Pupils: Pupils are equal, round, and reactive to light.   Cardiovascular:      Rate and Rhythm: Normal rate and regular rhythm.      Heart sounds: No murmur heard.  Pulmonary:      Effort: Pulmonary effort is normal.      Breath sounds: Normal breath sounds.   Musculoskeletal:         General: Deformity (hip external rotation bilaterally) " present.      Cervical back: Normal range of motion and neck supple.   Skin:     General: Skin is warm and dry.   Neurological:      Mental Status: She is alert and oriented to person, place, and time.      Cranial Nerves: No cranial nerve deficit.      Coordination: Coordination abnormal.      Gait: Gait abnormal.   Psychiatric:         Behavior: Behavior normal.         Thought Content: Thought content normal.         Judgment: Judgment normal.           Diagnoses and all orders for this visit:    1. Mass of spinal cord (Primary)  Comments:  s/p surgery. needs mri upcoming and subsequent surgery. in alot of pain. pain meds are temporary as discussed and they are in agreement.  Orders:  -     HYDROcodone-acetaminophen (NORCO) 5-325 MG per tablet; Take 1 tablet by mouth Every 6 (Six) Hours As Needed for Moderate Pain.  Dispense: 60 tablet; Refill: 0    2. Pituitary adenoma  Comments:  follow up with  and we will see what he thinks about this. Possibility of IIH as well and with symptoms we may want to add diamox.    3. Intradural extramedullary spinal tumor  Comments:  same as above  Orders:  -     HYDROcodone-acetaminophen (NORCO) 5-325 MG per tablet; Take 1 tablet by mouth Every 6 (Six) Hours As Needed for Moderate Pain.  Dispense: 60 tablet; Refill: 0    4. Chronic midline thoracic back pain  Comments:  same as above  Orders:  -     HYDROcodone-acetaminophen (NORCO) 5-325 MG per tablet; Take 1 tablet by mouth Every 6 (Six) Hours As Needed for Moderate Pain.  Dispense: 60 tablet; Refill: 0    5. Hyperglycemia  Comments:  check a1c.  Orders:  -     Hemoglobin A1c; Future    Other orders  -     gabapentin (NEURONTIN) 300 MG capsule; Take 2 capsules by mouth Every Night for 90 days.  Dispense: 180 capsule; Refill: 0      Track meals for one week on roland that we discussed and we will work on diet after things settle more. We will see neurosurgery with  and get his opinion. Consider diamox for possible IIH  after that appointment. Temporary pain meds given until back surgery. Check A1c now that off all steroids and out of surgeries.     Return in about 4 months (around 8/17/2025), or if symptoms worsen or fail to improve, for Recheck.

## 2025-05-12 DIAGNOSIS — M54.31 BILATERAL SCIATICA: ICD-10-CM

## 2025-05-12 DIAGNOSIS — D49.7 INTRADURAL EXTRAMEDULLARY SPINAL TUMOR: ICD-10-CM

## 2025-05-12 DIAGNOSIS — M54.32 BILATERAL SCIATICA: ICD-10-CM

## 2025-05-12 DIAGNOSIS — F41.1 GENERALIZED ANXIETY DISORDER: ICD-10-CM

## 2025-05-12 DIAGNOSIS — G95.89 MASS OF SPINAL CORD: ICD-10-CM

## 2025-05-12 DIAGNOSIS — M54.6 CHRONIC MIDLINE THORACIC BACK PAIN: ICD-10-CM

## 2025-05-12 DIAGNOSIS — G89.29 CHRONIC MIDLINE THORACIC BACK PAIN: ICD-10-CM

## 2025-05-12 RX ORDER — DIAZEPAM 5 MG/1
5 TABLET ORAL NIGHTLY PRN
Qty: 30 TABLET | Refills: 0 | Status: CANCELLED | OUTPATIENT
Start: 2025-05-12

## 2025-05-12 RX ORDER — ATORVASTATIN CALCIUM 80 MG/1
80 TABLET, FILM COATED ORAL DAILY
Qty: 90 TABLET | Refills: 1 | Status: CANCELLED | OUTPATIENT
Start: 2025-05-12

## 2025-05-12 RX ORDER — TRAZODONE HYDROCHLORIDE 50 MG/1
50 TABLET ORAL NIGHTLY
Qty: 90 TABLET | Refills: 3 | Status: CANCELLED | OUTPATIENT
Start: 2025-05-12

## 2025-05-12 NOTE — TELEPHONE ENCOUNTER
We sent 90 days of all the meds and some with refills. Patient needs to call pharmacy for refills.    She is asking for Diazepm, Ativan,Trazodone  and Norco    Printed Inspect.     Norco 4/17/25 #60             4/7/25 #30             3/28/25 #30             3/7/25 #30    Will place on desk. Not sure if she is on all these.    Also Gabapentin 100mg and 300mg. The 100s are wrote by Jolly Hodge    300s by Britton shore filled #180, 4/18  100 s by Naveen filled #90 3/7    Report on desk

## 2025-05-13 RX ORDER — MELOXICAM 15 MG/1
15 TABLET ORAL DAILY
Qty: 90 TABLET | Refills: 1 | Status: SHIPPED | OUTPATIENT
Start: 2025-05-13 | End: 2025-05-14 | Stop reason: SDUPTHER

## 2025-05-14 DIAGNOSIS — M54.6 CHRONIC MIDLINE THORACIC BACK PAIN: ICD-10-CM

## 2025-05-14 DIAGNOSIS — M54.32 BILATERAL SCIATICA: ICD-10-CM

## 2025-05-14 DIAGNOSIS — G89.29 CHRONIC MIDLINE THORACIC BACK PAIN: ICD-10-CM

## 2025-05-14 DIAGNOSIS — F41.1 GENERALIZED ANXIETY DISORDER: ICD-10-CM

## 2025-05-14 DIAGNOSIS — D49.7 INTRADURAL EXTRAMEDULLARY SPINAL TUMOR: ICD-10-CM

## 2025-05-14 DIAGNOSIS — G95.89 MASS OF SPINAL CORD: ICD-10-CM

## 2025-05-14 DIAGNOSIS — M54.31 BILATERAL SCIATICA: ICD-10-CM

## 2025-05-14 RX ORDER — CYCLOBENZAPRINE HCL 10 MG
10 TABLET ORAL NIGHTLY
Qty: 90 TABLET | Refills: 0 | Status: SHIPPED | OUTPATIENT
Start: 2025-05-14 | End: 2025-05-14

## 2025-05-14 RX ORDER — LORAZEPAM 1 MG/1
1 TABLET ORAL EVERY 8 HOURS PRN
Qty: 30 TABLET | Refills: 3 | Status: SHIPPED | OUTPATIENT
Start: 2025-05-14 | End: 2025-05-14

## 2025-05-14 RX ORDER — NYSTATIN 100000 [USP'U]/G
POWDER TOPICAL 3 TIMES DAILY
Qty: 60 G | Refills: 1 | Status: SHIPPED | OUTPATIENT
Start: 2025-05-14

## 2025-05-14 RX ORDER — MELOXICAM 15 MG/1
15 TABLET ORAL DAILY
Qty: 90 TABLET | Refills: 1 | Status: SHIPPED | OUTPATIENT
Start: 2025-05-14

## 2025-05-14 RX ORDER — ONDANSETRON 4 MG/1
4 TABLET, FILM COATED ORAL EVERY 8 HOURS PRN
Qty: 30 TABLET | Refills: 3 | Status: SHIPPED | OUTPATIENT
Start: 2025-05-14 | End: 2025-05-14

## 2025-05-14 RX ORDER — HYDROCODONE BITARTRATE AND ACETAMINOPHEN 5; 325 MG/1; MG/1
1 TABLET ORAL EVERY 6 HOURS PRN
Qty: 60 TABLET | Refills: 0 | Status: SHIPPED | OUTPATIENT
Start: 2025-05-14

## 2025-05-15 RX ORDER — SERTRALINE HYDROCHLORIDE 100 MG/1
100 TABLET, FILM COATED ORAL DAILY
Qty: 90 TABLET | Refills: 1 | OUTPATIENT
Start: 2025-05-15

## 2025-05-15 RX ORDER — ONDANSETRON 4 MG/1
TABLET, FILM COATED ORAL
Qty: 30 TABLET | Refills: 0 | OUTPATIENT
Start: 2025-05-15

## 2025-05-15 RX ORDER — PANTOPRAZOLE SODIUM 40 MG/1
40 TABLET, DELAYED RELEASE ORAL DAILY
Qty: 90 TABLET | Refills: 1 | OUTPATIENT
Start: 2025-05-15

## 2025-05-15 RX ORDER — LORAZEPAM 1 MG/1
1 TABLET ORAL EVERY 8 HOURS PRN
Qty: 30 TABLET | Refills: 3 | OUTPATIENT
Start: 2025-05-15

## 2025-05-15 RX ORDER — DIAZEPAM 5 MG/1
5 TABLET ORAL NIGHTLY PRN
Qty: 30 TABLET | Refills: 0 | OUTPATIENT
Start: 2025-05-15

## 2025-05-15 RX ORDER — CYCLOBENZAPRINE HCL 10 MG
10 TABLET ORAL NIGHTLY
Qty: 90 TABLET | Refills: 0 | OUTPATIENT
Start: 2025-05-15

## 2025-05-15 RX ORDER — LAMOTRIGINE 150 MG/1
150 TABLET ORAL 2 TIMES DAILY
Qty: 180 TABLET | Refills: 1 | OUTPATIENT
Start: 2025-05-15

## 2025-05-15 RX ORDER — EZETIMIBE 10 MG/1
10 TABLET ORAL DAILY
Qty: 90 TABLET | Refills: 3 | OUTPATIENT
Start: 2025-05-15

## 2025-05-15 RX ORDER — BROMPHENIRAMINE MALEATE, PSEUDOEPHEDRINE HYDROCHLORIDE, AND DEXTROMETHORPHAN HYDROBROMIDE 2; 30; 10 MG/5ML; MG/5ML; MG/5ML
SYRUP ORAL
Qty: 473 ML | Refills: 0 | OUTPATIENT
Start: 2025-05-15

## 2025-05-15 RX ORDER — HYDROCODONE BITARTRATE AND ACETAMINOPHEN 5; 325 MG/1; MG/1
1 TABLET ORAL EVERY 6 HOURS PRN
Qty: 60 TABLET | Refills: 0 | OUTPATIENT
Start: 2025-05-15

## 2025-05-15 RX ORDER — ATORVASTATIN CALCIUM 80 MG/1
80 TABLET, FILM COATED ORAL DAILY
Qty: 90 TABLET | Refills: 1 | OUTPATIENT
Start: 2025-05-15

## 2025-05-15 RX ORDER — TRAZODONE HYDROCHLORIDE 50 MG/1
50 TABLET ORAL NIGHTLY
Qty: 90 TABLET | Refills: 3 | OUTPATIENT
Start: 2025-05-15

## 2025-05-27 ENCOUNTER — HOSPITAL ENCOUNTER (OUTPATIENT)
Dept: MRI IMAGING | Facility: HOSPITAL | Age: 40
Discharge: HOME | End: 2025-05-27
Admitting: NEUROLOGICAL SURGERY
Payer: MEDICARE

## 2025-05-27 DIAGNOSIS — D49.7 INTRADURAL EXTRAMEDULLARY SPINAL TUMOR: ICD-10-CM

## 2025-05-27 PROCEDURE — 72158 MRI LUMBAR SPINE W/O & W/DYE: CPT

## 2025-05-27 PROCEDURE — A9573 GADOPICLENOL 0.5 MMOL/ML SOLUTION: HCPCS | Performed by: NEUROLOGICAL SURGERY

## 2025-05-27 PROCEDURE — 25510000001 GADOPICLENOL 0.5 MMOL/ML SOLUTION: Performed by: NEUROLOGICAL SURGERY

## 2025-05-27 RX ADMIN — GADOPICLENOL 7 ML: 485.1 INJECTION INTRAVENOUS at 09:09

## 2025-05-30 NOTE — PROGRESS NOTES
"Subjective   History of Present Illness: Elyssa Olivarez is a 39 y.o. female is here today for follow-up with a new Lumbar MRI. Today patient reports she has been falling and states her entire back hurts.  Patient denies any new numbness weakness or pain in her legs.      Chief Complaint   Patient presents with    Back Pain          Previous treatment:   NSAID'S, Muscle Relaxants, Narcotic's, Rest, Post-op PT, and Nerve pain medications    Previous neurosurgery:  12/30/2024- L4-S1 laminectomy for resection or intradural tumor     Previous injections:     The following portions of the patient's history were reviewed and updated as appropriate: allergies, current medications, past family history, past medical history, past social history, past surgical history, and problem list.    Review of Systems   Constitutional:  Positive for activity change.   HENT: Negative.     Eyes: Negative.    Cardiovascular: Negative.    Gastrointestinal: Negative.    Endocrine: Negative.    Genitourinary: Negative.    Musculoskeletal:  Positive for arthralgias, back pain (bilateral lower/mid) and myalgias.   Skin: Negative.    Allergic/Immunologic: Negative.    Neurological: Negative.         Feels off balance   Hematological: Negative.    Psychiatric/Behavioral:  Positive for sleep disturbance.        Objective      /94 (BP Location: Right arm, Patient Position: Sitting)   Pulse 88   Ht 157.5 cm (62\")   Wt 115 kg (253 lb)   SpO2 97%   BMI 46.27 kg/m²    Body mass index is 46.27 kg/m².  Vitals:    06/02/25 1040   PainSc: 6          Neurological Exam        Assessment & Plan   Independent Review of Radiographic Studies:      I personally reviewed and interpreted the images from the following studies.    MRI lumbar spine: Previous laminectomy site with no evidence of residual or recurrent tumor    Medical Decision Making:      Elyssa Olivarez is a 39 y.o. female status post lumbar laminectomy and resection of schwannoma " overall doing okay now about 5 months postop.  MRI is reassuring with no evidence of residual or recurrent tumor.  I will see her back in 1 year with an MRI of the lumbar spine with and without contrast to ensure no reoccurrence.  If that MRI looks good then I think we can discharge her from clinic.      Diagnoses and all orders for this visit:    1. Intradural extramedullary spinal tumor (Primary)  -     MRI Lumbar Spine With & Without Contrast; Future      No follow-ups on file.    This patient was examined wearing appropriate personal protective equipment.                      Dr. Delmar Rodriguez IV    06/02/25  10:58 EDT

## 2025-06-02 ENCOUNTER — OFFICE VISIT (OUTPATIENT)
Dept: NEUROSURGERY | Facility: CLINIC | Age: 40
End: 2025-06-02
Payer: MEDICARE

## 2025-06-02 VITALS
WEIGHT: 253 LBS | SYSTOLIC BLOOD PRESSURE: 147 MMHG | DIASTOLIC BLOOD PRESSURE: 94 MMHG | BODY MASS INDEX: 46.56 KG/M2 | HEART RATE: 88 BPM | OXYGEN SATURATION: 97 % | HEIGHT: 62 IN

## 2025-06-02 DIAGNOSIS — D49.7 INTRADURAL EXTRAMEDULLARY SPINAL TUMOR: Primary | ICD-10-CM

## 2025-06-02 PROCEDURE — 1159F MED LIST DOCD IN RCRD: CPT | Performed by: NEUROLOGICAL SURGERY

## 2025-06-02 PROCEDURE — 1160F RVW MEDS BY RX/DR IN RCRD: CPT | Performed by: NEUROLOGICAL SURGERY

## 2025-06-02 PROCEDURE — 99213 OFFICE O/P EST LOW 20 MIN: CPT | Performed by: NEUROLOGICAL SURGERY

## 2025-06-10 DIAGNOSIS — M54.31 BILATERAL SCIATICA: ICD-10-CM

## 2025-06-10 DIAGNOSIS — M54.32 BILATERAL SCIATICA: ICD-10-CM

## 2025-06-10 DIAGNOSIS — F41.1 GENERALIZED ANXIETY DISORDER: ICD-10-CM

## 2025-06-10 RX ORDER — MELOXICAM 15 MG/1
15 TABLET ORAL DAILY
Qty: 90 TABLET | Refills: 1 | Status: SHIPPED | OUTPATIENT
Start: 2025-06-10

## 2025-06-12 RX ORDER — ATORVASTATIN CALCIUM 80 MG/1
80 TABLET, FILM COATED ORAL DAILY
Qty: 90 TABLET | Refills: 1 | Status: SHIPPED | OUTPATIENT
Start: 2025-06-12

## 2025-06-12 RX ORDER — EZETIMIBE 10 MG/1
10 TABLET ORAL DAILY
Qty: 90 TABLET | Refills: 3 | Status: SHIPPED | OUTPATIENT
Start: 2025-06-12

## 2025-06-12 RX ORDER — DIAZEPAM 5 MG/1
5 TABLET ORAL NIGHTLY PRN
Qty: 30 TABLET | Refills: 0 | Status: SHIPPED | OUTPATIENT
Start: 2025-06-12

## 2025-06-21 DIAGNOSIS — M54.6 CHRONIC MIDLINE THORACIC BACK PAIN: ICD-10-CM

## 2025-06-21 DIAGNOSIS — M54.31 BILATERAL SCIATICA: ICD-10-CM

## 2025-06-21 DIAGNOSIS — G95.89 MASS OF SPINAL CORD: ICD-10-CM

## 2025-06-21 DIAGNOSIS — G89.29 CHRONIC MIDLINE THORACIC BACK PAIN: ICD-10-CM

## 2025-06-21 DIAGNOSIS — F41.1 GENERALIZED ANXIETY DISORDER: ICD-10-CM

## 2025-06-21 DIAGNOSIS — M54.32 BILATERAL SCIATICA: ICD-10-CM

## 2025-06-21 DIAGNOSIS — D49.7 INTRADURAL EXTRAMEDULLARY SPINAL TUMOR: ICD-10-CM

## 2025-06-21 RX ORDER — MELOXICAM 15 MG/1
15 TABLET ORAL DAILY
Qty: 90 TABLET | Refills: 1 | Status: CANCELLED | OUTPATIENT
Start: 2025-06-21

## 2025-06-23 RX ORDER — DIAZEPAM 5 MG/1
5 TABLET ORAL NIGHTLY PRN
Qty: 30 TABLET | Refills: 0 | Status: SHIPPED | OUTPATIENT
Start: 2025-06-23

## 2025-06-23 RX ORDER — HYDROCODONE BITARTRATE AND ACETAMINOPHEN 5; 325 MG/1; MG/1
1 TABLET ORAL EVERY 6 HOURS PRN
Qty: 60 TABLET | Refills: 0 | Status: SHIPPED | OUTPATIENT
Start: 2025-06-23

## 2025-06-23 RX ORDER — ATORVASTATIN CALCIUM 80 MG/1
80 TABLET, FILM COATED ORAL DAILY
Qty: 90 TABLET | Refills: 1 | Status: SHIPPED | OUTPATIENT
Start: 2025-06-23

## 2025-07-14 DIAGNOSIS — G89.29 CHRONIC MIDLINE THORACIC BACK PAIN: ICD-10-CM

## 2025-07-14 DIAGNOSIS — M54.32 BILATERAL SCIATICA: ICD-10-CM

## 2025-07-14 DIAGNOSIS — D49.7 INTRADURAL EXTRAMEDULLARY SPINAL TUMOR: ICD-10-CM

## 2025-07-14 DIAGNOSIS — M54.31 BILATERAL SCIATICA: ICD-10-CM

## 2025-07-14 DIAGNOSIS — F41.1 GENERALIZED ANXIETY DISORDER: ICD-10-CM

## 2025-07-14 DIAGNOSIS — G95.89 MASS OF SPINAL CORD: ICD-10-CM

## 2025-07-14 DIAGNOSIS — M54.6 CHRONIC MIDLINE THORACIC BACK PAIN: ICD-10-CM

## 2025-07-14 RX ORDER — MELOXICAM 15 MG/1
15 TABLET ORAL DAILY
Qty: 90 TABLET | Refills: 1 | Status: SHIPPED | OUTPATIENT
Start: 2025-07-14 | End: 2025-07-19 | Stop reason: SDUPTHER

## 2025-07-14 RX ORDER — TRAZODONE HYDROCHLORIDE 50 MG/1
50 TABLET ORAL NIGHTLY
Qty: 90 TABLET | Refills: 1 | Status: SHIPPED | OUTPATIENT
Start: 2025-07-14 | End: 2025-07-19 | Stop reason: SDUPTHER

## 2025-07-14 RX ORDER — LAMOTRIGINE 150 MG/1
150 TABLET ORAL 2 TIMES DAILY
Qty: 180 TABLET | Refills: 1 | Status: SHIPPED | OUTPATIENT
Start: 2025-07-14

## 2025-07-14 RX ORDER — DIAZEPAM 5 MG/1
5 TABLET ORAL NIGHTLY PRN
Qty: 30 TABLET | Refills: 0 | Status: SHIPPED | OUTPATIENT
Start: 2025-07-14

## 2025-07-14 RX ORDER — GABAPENTIN 300 MG/1
600 CAPSULE ORAL NIGHTLY
Qty: 180 CAPSULE | Refills: 0 | Status: SHIPPED | OUTPATIENT
Start: 2025-07-14 | End: 2025-10-12

## 2025-07-14 RX ORDER — EZETIMIBE 10 MG/1
10 TABLET ORAL DAILY
Qty: 90 TABLET | Refills: 1 | Status: SHIPPED | OUTPATIENT
Start: 2025-07-14

## 2025-07-14 RX ORDER — NYSTATIN 100000 [USP'U]/G
POWDER TOPICAL 3 TIMES DAILY
Qty: 60 G | Refills: 1 | Status: SHIPPED | OUTPATIENT
Start: 2025-07-14

## 2025-07-14 RX ORDER — SERTRALINE HYDROCHLORIDE 100 MG/1
100 TABLET, FILM COATED ORAL DAILY
Qty: 90 TABLET | Refills: 1 | Status: SHIPPED | OUTPATIENT
Start: 2025-07-14

## 2025-07-14 RX ORDER — HYDROCODONE BITARTRATE AND ACETAMINOPHEN 5; 325 MG/1; MG/1
1 TABLET ORAL EVERY 6 HOURS PRN
Qty: 60 TABLET | Refills: 0 | Status: SHIPPED | OUTPATIENT
Start: 2025-07-14

## 2025-07-14 RX ORDER — ATORVASTATIN CALCIUM 80 MG/1
80 TABLET, FILM COATED ORAL DAILY
Qty: 90 TABLET | Refills: 1 | Status: SHIPPED | OUTPATIENT
Start: 2025-07-14

## 2025-07-14 RX ORDER — PANTOPRAZOLE SODIUM 40 MG/1
40 TABLET, DELAYED RELEASE ORAL DAILY
Qty: 90 TABLET | Refills: 1 | Status: SHIPPED | OUTPATIENT
Start: 2025-07-14

## 2025-07-19 DIAGNOSIS — M54.31 BILATERAL SCIATICA: ICD-10-CM

## 2025-07-19 DIAGNOSIS — M54.32 BILATERAL SCIATICA: ICD-10-CM

## 2025-07-21 RX ORDER — MELOXICAM 15 MG/1
15 TABLET ORAL DAILY
Qty: 90 TABLET | Refills: 1 | Status: SHIPPED | OUTPATIENT
Start: 2025-07-21

## 2025-07-21 RX ORDER — TRAZODONE HYDROCHLORIDE 50 MG/1
50 TABLET ORAL NIGHTLY
Qty: 90 TABLET | Refills: 1 | Status: SHIPPED | OUTPATIENT
Start: 2025-07-21

## 2025-08-12 DIAGNOSIS — M54.6 CHRONIC MIDLINE THORACIC BACK PAIN: ICD-10-CM

## 2025-08-12 DIAGNOSIS — M54.31 BILATERAL SCIATICA: ICD-10-CM

## 2025-08-12 DIAGNOSIS — G89.29 CHRONIC MIDLINE THORACIC BACK PAIN: ICD-10-CM

## 2025-08-12 DIAGNOSIS — G95.89 MASS OF SPINAL CORD: ICD-10-CM

## 2025-08-12 DIAGNOSIS — M54.32 BILATERAL SCIATICA: ICD-10-CM

## 2025-08-12 DIAGNOSIS — D49.7 INTRADURAL EXTRAMEDULLARY SPINAL TUMOR: ICD-10-CM

## 2025-08-12 DIAGNOSIS — F41.1 GENERALIZED ANXIETY DISORDER: ICD-10-CM

## 2025-08-13 RX ORDER — GABAPENTIN 300 MG/1
600 CAPSULE ORAL NIGHTLY
Qty: 180 CAPSULE | Refills: 0 | Status: SHIPPED | OUTPATIENT
Start: 2025-08-13 | End: 2025-11-11

## 2025-08-13 RX ORDER — ATORVASTATIN CALCIUM 80 MG/1
80 TABLET, FILM COATED ORAL DAILY
Qty: 90 TABLET | Refills: 1 | Status: SHIPPED | OUTPATIENT
Start: 2025-08-13

## 2025-08-13 RX ORDER — PANTOPRAZOLE SODIUM 40 MG/1
40 TABLET, DELAYED RELEASE ORAL DAILY
Qty: 90 TABLET | Refills: 1 | Status: SHIPPED | OUTPATIENT
Start: 2025-08-13

## 2025-08-13 RX ORDER — NYSTATIN 100000 [USP'U]/G
POWDER TOPICAL 3 TIMES DAILY
Qty: 60 G | Refills: 1 | Status: SHIPPED | OUTPATIENT
Start: 2025-08-13

## 2025-08-13 RX ORDER — MELOXICAM 15 MG/1
15 TABLET ORAL DAILY
Qty: 90 TABLET | Refills: 1 | Status: SHIPPED | OUTPATIENT
Start: 2025-08-13

## 2025-08-13 RX ORDER — TRAZODONE HYDROCHLORIDE 50 MG/1
50 TABLET ORAL NIGHTLY
Qty: 90 TABLET | Refills: 1 | Status: SHIPPED | OUTPATIENT
Start: 2025-08-13

## 2025-08-13 RX ORDER — EZETIMIBE 10 MG/1
10 TABLET ORAL DAILY
Qty: 90 TABLET | Refills: 1 | Status: SHIPPED | OUTPATIENT
Start: 2025-08-13

## 2025-08-14 RX ORDER — DIAZEPAM 5 MG/1
5 TABLET ORAL NIGHTLY PRN
Qty: 30 TABLET | Refills: 0 | Status: SHIPPED | OUTPATIENT
Start: 2025-08-14

## 2025-08-14 RX ORDER — HYDROCODONE BITARTRATE AND ACETAMINOPHEN 5; 325 MG/1; MG/1
1 TABLET ORAL EVERY 6 HOURS PRN
Qty: 60 TABLET | Refills: 0 | Status: SHIPPED | OUTPATIENT
Start: 2025-08-14

## 2025-08-18 ENCOUNTER — LAB (OUTPATIENT)
Dept: FAMILY MEDICINE CLINIC | Facility: CLINIC | Age: 40
End: 2025-08-18
Payer: MEDICARE

## 2025-08-18 ENCOUNTER — OFFICE VISIT (OUTPATIENT)
Dept: FAMILY MEDICINE CLINIC | Facility: CLINIC | Age: 40
End: 2025-08-18
Payer: MEDICARE

## 2025-08-18 VITALS
HEIGHT: 62 IN | HEART RATE: 95 BPM | SYSTOLIC BLOOD PRESSURE: 128 MMHG | RESPIRATION RATE: 14 BRPM | OXYGEN SATURATION: 98 % | WEIGHT: 250 LBS | DIASTOLIC BLOOD PRESSURE: 72 MMHG | BODY MASS INDEX: 46.01 KG/M2

## 2025-08-18 DIAGNOSIS — G89.29 CHRONIC BILATERAL LOW BACK PAIN, UNSPECIFIED WHETHER SCIATICA PRESENT: ICD-10-CM

## 2025-08-18 DIAGNOSIS — D49.7 INTRADURAL EXTRAMEDULLARY SPINAL TUMOR: Primary | ICD-10-CM

## 2025-08-18 DIAGNOSIS — M54.50 CHRONIC BILATERAL LOW BACK PAIN, UNSPECIFIED WHETHER SCIATICA PRESENT: ICD-10-CM

## 2025-08-18 DIAGNOSIS — E78.2 MIXED HYPERLIPIDEMIA: ICD-10-CM

## 2025-08-18 DIAGNOSIS — R73.9 HYPERGLYCEMIA: ICD-10-CM

## 2025-08-18 DIAGNOSIS — R73.03 PREDIABETES: ICD-10-CM

## 2025-08-18 PROBLEM — L02.93 CARBUNCLE: Status: RESOLVED | Noted: 2022-07-08 | Resolved: 2025-08-18

## 2025-08-18 PROBLEM — M54.9 BACK PAIN: Status: RESOLVED | Noted: 2024-12-05 | Resolved: 2025-08-18

## 2025-08-18 PROBLEM — S82.842A DISPLACED BIMALLEOLAR FRACTURE OF LEFT LOWER LEG, INITIAL ENCOUNTER FOR CLOSED FRACTURE: Status: RESOLVED | Noted: 2019-03-11 | Resolved: 2025-08-18

## 2025-08-18 PROBLEM — M54.10 RADICULAR LEG PAIN: Status: RESOLVED | Noted: 2024-12-05 | Resolved: 2025-08-18

## 2025-08-18 PROBLEM — R93.7 ABNORMAL MRI, LUMBAR SPINE: Status: RESOLVED | Noted: 2024-12-05 | Resolved: 2025-08-18

## 2025-08-18 PROBLEM — L02.31 ABSCESS OF LEFT BUTTOCK: Status: RESOLVED | Noted: 2022-10-07 | Resolved: 2025-08-18

## 2025-08-18 LAB — HBA1C MFR BLD: 7.4 % (ref 4.8–5.6)

## 2025-08-18 PROCEDURE — 1160F RVW MEDS BY RX/DR IN RCRD: CPT | Performed by: PHYSICIAN ASSISTANT

## 2025-08-18 PROCEDURE — 83036 HEMOGLOBIN GLYCOSYLATED A1C: CPT | Performed by: PHYSICIAN ASSISTANT

## 2025-08-18 PROCEDURE — 99214 OFFICE O/P EST MOD 30 MIN: CPT | Performed by: PHYSICIAN ASSISTANT

## 2025-08-18 PROCEDURE — G2211 COMPLEX E/M VISIT ADD ON: HCPCS | Performed by: PHYSICIAN ASSISTANT

## 2025-08-18 PROCEDURE — 36415 COLL VENOUS BLD VENIPUNCTURE: CPT

## 2025-08-18 PROCEDURE — 1125F AMNT PAIN NOTED PAIN PRSNT: CPT | Performed by: PHYSICIAN ASSISTANT

## 2025-08-18 PROCEDURE — 1159F MED LIST DOCD IN RCRD: CPT | Performed by: PHYSICIAN ASSISTANT

## 2025-08-19 DIAGNOSIS — E11.65 TYPE 2 DIABETES MELLITUS WITH HYPERGLYCEMIA, WITHOUT LONG-TERM CURRENT USE OF INSULIN: Primary | ICD-10-CM

## (undated) DEVICE — ANTIBACTERIAL UNDYED BRAIDED (POLYGLACTIN 910), SYNTHETIC ABSORBABLE SUTURE: Brand: COATED VICRYL

## (undated) DEVICE — TUBING, SUCTION, 1/4" X 12', STRAIGHT: Brand: MEDLINE

## (undated) DEVICE — SPONGE,LAP,12"X12",XR,ST,5/PK,40PK/CS: Brand: MEDLINE

## (undated) DEVICE — DRP OPMI 326071

## (undated) DEVICE — DECANTER: Brand: UNBRANDED

## (undated) DEVICE — FRCP SPEC BIP BAYO NONSTK W/CORD 8IN 1MM DISP

## (undated) DEVICE — SYR LUERLOK 30CC

## (undated) DEVICE — 3.0MM PRECISION NEURO (MATCH HEAD)

## (undated) DEVICE — KT SURG TURNOVER 050

## (undated) DEVICE — DRP C/ARMOR

## (undated) DEVICE — SUT NUROLON 4/0 TF18 CR8 I8IN C584D

## (undated) DEVICE — CONTAINER,SPECIMEN,OR STERILE,4OZ: Brand: MEDLINE

## (undated) DEVICE — 6.0MM PRECISION ROUND

## (undated) DEVICE — COVADERM: Brand: DEROYAL

## (undated) DEVICE — Device

## (undated) DEVICE — SHEET, DRAPE, SPLIT, STERILE: Brand: MEDLINE

## (undated) DEVICE — UNDERGLV SURG BIOGEL/PI PF SYNTH SURG SZ8.5 BLU 50/BX

## (undated) DEVICE — PK BASIC SPINE 50

## (undated) DEVICE — SOLUTION,WATER,IRRIGATION,1000ML,STERILE: Brand: MEDLINE

## (undated) DEVICE — DRSNG TELFA PAD NONADH STR 1S 3X4IN

## (undated) DEVICE — GLV SURG BIOGEL LTX PF 8

## (undated) DEVICE — SEALANT WND FIBRIN TISSEEL PREFIL/SYR/PRIMAFZ 10ML

## (undated) DEVICE — BIPOLAR SEALER 23-112-1 AQM 6.0: Brand: AQUAMANTYS™

## (undated) DEVICE — SPONGE,NEURO,1"X1",XR,STRL,LF,10/PK: Brand: MEDLINE

## (undated) DEVICE — SPONGE,NEURO,0.5"X3",XR,STRL,LF,10/PK: Brand: MEDLINE

## (undated) DEVICE — SMOKE EVACUATION TUBING WITH 7/8 IN TO 1/4 IN REDUCER: Brand: BUFFALO FILTER